# Patient Record
Sex: FEMALE | Race: WHITE | NOT HISPANIC OR LATINO | Employment: OTHER | URBAN - METROPOLITAN AREA
[De-identification: names, ages, dates, MRNs, and addresses within clinical notes are randomized per-mention and may not be internally consistent; named-entity substitution may affect disease eponyms.]

---

## 2019-10-20 ENCOUNTER — HOSPITAL ENCOUNTER (INPATIENT)
Facility: HOSPITAL | Age: 54
LOS: 2 days | Discharge: HOME | DRG: 433 | End: 2019-10-22
Attending: EMERGENCY MEDICINE | Admitting: FAMILY MEDICINE
Payer: MEDICARE

## 2019-10-20 ENCOUNTER — APPOINTMENT (INPATIENT)
Dept: RADIOLOGY | Facility: HOSPITAL | Age: 54
DRG: 433 | End: 2019-10-20
Payer: MEDICARE

## 2019-10-20 ENCOUNTER — APPOINTMENT (EMERGENCY)
Dept: CT IMAGING | Facility: HOSPITAL | Age: 54
DRG: 433 | End: 2019-10-20
Payer: MEDICARE

## 2019-10-20 ENCOUNTER — APPOINTMENT (INPATIENT)
Dept: CT IMAGING | Facility: HOSPITAL | Age: 54
DRG: 433 | End: 2019-10-20
Payer: MEDICARE

## 2019-10-20 DIAGNOSIS — K92.2 GI BLEED: ICD-10-CM

## 2019-10-20 DIAGNOSIS — F10.10 ALCOHOL ABUSE: ICD-10-CM

## 2019-10-20 DIAGNOSIS — F31.9 BIPOLAR DISORDER (HCC): ICD-10-CM

## 2019-10-20 DIAGNOSIS — R94.31 QT PROLONGATION: ICD-10-CM

## 2019-10-20 DIAGNOSIS — D64.9 SYMPTOMATIC ANEMIA: Primary | ICD-10-CM

## 2019-10-20 DIAGNOSIS — E87.6 HYPOKALEMIA: ICD-10-CM

## 2019-10-20 DIAGNOSIS — E87.1 HYPONATREMIA: ICD-10-CM

## 2019-10-20 LAB
ABO GROUP BLD: NORMAL
ALBUMIN SERPL BCP-MCNC: 3.7 G/DL (ref 3–5.2)
ALP SERPL-CCNC: 230 U/L (ref 43–122)
ALT SERPL W P-5'-P-CCNC: 51 U/L (ref 9–52)
ANION GAP SERPL CALCULATED.3IONS-SCNC: 16 MMOL/L (ref 5–14)
ANISOCYTOSIS BLD QL SMEAR: PRESENT
APTT PPP: 26 SECONDS (ref 25–32)
AST SERPL W P-5'-P-CCNC: 203 U/L (ref 14–36)
BACTERIA UR QL AUTO: ABNORMAL /HPF
BILIRUB DIRECT SERPL-MCNC: 0.8 MG/DL
BILIRUB SERPL-MCNC: 1.2 MG/DL
BILIRUB UR QL STRIP: NEGATIVE
BLD GP AB SCN SERPL QL: NEGATIVE
BUN SERPL-MCNC: 23 MG/DL (ref 5–25)
CALCIUM SERPL-MCNC: 8.9 MG/DL (ref 8.4–10.2)
CHLORIDE SERPL-SCNC: 88 MMOL/L (ref 97–108)
CLARITY UR: ABNORMAL
CO2 SERPL-SCNC: 29 MMOL/L (ref 22–30)
COLOR UR: YELLOW
CREAT SERPL-MCNC: 0.6 MG/DL (ref 0.6–1.2)
ERYTHROCYTE [DISTWIDTH] IN BLOOD BY AUTOMATED COUNT: 16.8 %
ETHANOL EXG-MCNC: 0.2 MG/DL
ETHANOL SERPL-MCNC: 149 MG/DL (ref 0–10)
GFR SERPL CREATININE-BSD FRML MDRD: 103 ML/MIN/1.73SQ M
GLUCOSE SERPL-MCNC: 101 MG/DL (ref 70–99)
GLUCOSE UR STRIP-MCNC: NEGATIVE MG/DL
HCT VFR BLD AUTO: 20.9 % (ref 36–46)
HGB BLD-MCNC: 6.9 G/DL (ref 12–16)
HGB UR QL STRIP.AUTO: NEGATIVE
HYPERCHROMIA BLD QL SMEAR: PRESENT
INR PPP: 1.07 (ref 0.91–1.09)
KETONES UR STRIP-MCNC: NEGATIVE MG/DL
LACTATE SERPL-SCNC: 3.2 MMOL/L (ref 0.7–2)
LACTATE SERPL-SCNC: 4.2 MMOL/L (ref 0.7–2)
LACTATE SERPL-SCNC: 4.4 MMOL/L (ref 0.7–2)
LEUKOCYTE ESTERASE UR QL STRIP: 100
LYMPHOCYTES # BLD AUTO: 22 % (ref 25–45)
LYMPHOCYTES # BLD AUTO: 3.83 THOUSAND/UL (ref 0.5–4)
MACROCYTES BLD QL AUTO: PRESENT
MCH RBC QN AUTO: 36.3 PG (ref 26–34)
MCHC RBC AUTO-ENTMCNC: 32.8 G/DL (ref 31–36)
MCV RBC AUTO: 111 FL (ref 80–100)
MONOCYTES # BLD AUTO: 1.74 THOUSAND/UL (ref 0.2–0.9)
MONOCYTES NFR BLD AUTO: 10 % (ref 1–10)
NEUTS SEG # BLD: 11.83 THOUSAND/UL (ref 1.8–7.8)
NEUTS SEG NFR BLD AUTO: 68 %
NITRITE UR QL STRIP: NEGATIVE
NON-SQ EPI CELLS URNS QL MICRO: ABNORMAL /HPF
NRBC BLD AUTO-RTO: 1 /100 WBC (ref 0–2)
PH UR STRIP.AUTO: 6 [PH]
PLATELET # BLD AUTO: 284 THOUSANDS/UL (ref 150–450)
PLATELET BLD QL SMEAR: ADEQUATE
PMV BLD AUTO: 9.4 FL (ref 8.9–12.7)
POLYCHROMASIA BLD QL SMEAR: PRESENT
POTASSIUM SERPL-SCNC: 2.3 MMOL/L (ref 3.6–5)
PROT SERPL-MCNC: 8.1 G/DL (ref 5.9–8.4)
PROT UR STRIP-MCNC: NEGATIVE MG/DL
PROTHROMBIN TIME: 11.8 SECONDS (ref 9.8–12)
RBC # BLD AUTO: 1.89 MILLION/UL (ref 4–5.2)
RBC #/AREA URNS AUTO: ABNORMAL /HPF
RBC MORPH BLD: ABNORMAL
RH BLD: POSITIVE
SODIUM SERPL-SCNC: 133 MMOL/L (ref 137–147)
SP GR UR STRIP.AUTO: 1.01 (ref 1–1.04)
SPECIMEN EXPIRATION DATE: NORMAL
TOTAL CELLS COUNTED SPEC: 100
TROPONIN I SERPL-MCNC: <0.01 NG/ML (ref 0–0.03)
UROBILINOGEN UA: NEGATIVE MG/DL
WBC # BLD AUTO: 17.4 THOUSAND/UL (ref 4.5–11)
WBC #/AREA URNS AUTO: ABNORMAL /HPF

## 2019-10-20 PROCEDURE — 71045 X-RAY EXAM CHEST 1 VIEW: CPT

## 2019-10-20 PROCEDURE — 85610 PROTHROMBIN TIME: CPT | Performed by: PHYSICIAN ASSISTANT

## 2019-10-20 PROCEDURE — 30233N1 TRANSFUSION OF NONAUTOLOGOUS RED BLOOD CELLS INTO PERIPHERAL VEIN, PERCUTANEOUS APPROACH: ICD-10-PCS | Performed by: EMERGENCY MEDICINE

## 2019-10-20 PROCEDURE — 99285 EMERGENCY DEPT VISIT HI MDM: CPT | Performed by: PHYSICIAN ASSISTANT

## 2019-10-20 PROCEDURE — 83605 ASSAY OF LACTIC ACID: CPT | Performed by: PHYSICIAN ASSISTANT

## 2019-10-20 PROCEDURE — 86901 BLOOD TYPING SEROLOGIC RH(D): CPT | Performed by: PHYSICIAN ASSISTANT

## 2019-10-20 PROCEDURE — 87040 BLOOD CULTURE FOR BACTERIA: CPT | Performed by: PHYSICIAN ASSISTANT

## 2019-10-20 PROCEDURE — C9113 INJ PANTOPRAZOLE SODIUM, VIA: HCPCS | Performed by: PHYSICIAN ASSISTANT

## 2019-10-20 PROCEDURE — 99223 1ST HOSP IP/OBS HIGH 75: CPT | Performed by: INTERNAL MEDICINE

## 2019-10-20 PROCEDURE — 96361 HYDRATE IV INFUSION ADD-ON: CPT

## 2019-10-20 PROCEDURE — P9016 RBC LEUKOCYTES REDUCED: HCPCS

## 2019-10-20 PROCEDURE — 72125 CT NECK SPINE W/O DYE: CPT

## 2019-10-20 PROCEDURE — 85730 THROMBOPLASTIN TIME PARTIAL: CPT | Performed by: PHYSICIAN ASSISTANT

## 2019-10-20 PROCEDURE — 36415 COLL VENOUS BLD VENIPUNCTURE: CPT | Performed by: PHYSICIAN ASSISTANT

## 2019-10-20 PROCEDURE — 84145 PROCALCITONIN (PCT): CPT | Performed by: EMERGENCY MEDICINE

## 2019-10-20 PROCEDURE — 96365 THER/PROPH/DIAG IV INF INIT: CPT

## 2019-10-20 PROCEDURE — 70450 CT HEAD/BRAIN W/O DYE: CPT

## 2019-10-20 PROCEDURE — 87086 URINE CULTURE/COLONY COUNT: CPT | Performed by: PHYSICIAN ASSISTANT

## 2019-10-20 PROCEDURE — 82075 ASSAY OF BREATH ETHANOL: CPT | Performed by: PHYSICIAN ASSISTANT

## 2019-10-20 PROCEDURE — 83605 ASSAY OF LACTIC ACID: CPT | Performed by: EMERGENCY MEDICINE

## 2019-10-20 PROCEDURE — 81001 URINALYSIS AUTO W/SCOPE: CPT | Performed by: PHYSICIAN ASSISTANT

## 2019-10-20 PROCEDURE — 99285 EMERGENCY DEPT VISIT HI MDM: CPT

## 2019-10-20 PROCEDURE — 86900 BLOOD TYPING SEROLOGIC ABO: CPT | Performed by: PHYSICIAN ASSISTANT

## 2019-10-20 PROCEDURE — 81003 URINALYSIS AUTO W/O SCOPE: CPT | Performed by: PHYSICIAN ASSISTANT

## 2019-10-20 PROCEDURE — 80048 BASIC METABOLIC PNL TOTAL CA: CPT | Performed by: PHYSICIAN ASSISTANT

## 2019-10-20 PROCEDURE — 86850 RBC ANTIBODY SCREEN: CPT | Performed by: PHYSICIAN ASSISTANT

## 2019-10-20 PROCEDURE — 80320 DRUG SCREEN QUANTALCOHOLS: CPT | Performed by: PHYSICIAN ASSISTANT

## 2019-10-20 PROCEDURE — 85027 COMPLETE CBC AUTOMATED: CPT | Performed by: PHYSICIAN ASSISTANT

## 2019-10-20 PROCEDURE — 80076 HEPATIC FUNCTION PANEL: CPT | Performed by: PHYSICIAN ASSISTANT

## 2019-10-20 PROCEDURE — 84484 ASSAY OF TROPONIN QUANT: CPT | Performed by: PHYSICIAN ASSISTANT

## 2019-10-20 PROCEDURE — 93005 ELECTROCARDIOGRAM TRACING: CPT

## 2019-10-20 PROCEDURE — 86920 COMPATIBILITY TEST SPIN: CPT

## 2019-10-20 PROCEDURE — 85007 BL SMEAR W/DIFF WBC COUNT: CPT | Performed by: PHYSICIAN ASSISTANT

## 2019-10-20 RX ORDER — ATORVASTATIN CALCIUM 40 MG/1
40 TABLET, FILM COATED ORAL DAILY
Status: DISCONTINUED | OUTPATIENT
Start: 2019-10-21 | End: 2019-10-22 | Stop reason: HOSPADM

## 2019-10-20 RX ORDER — PANTOPRAZOLE SODIUM 40 MG/1
40 INJECTION, POWDER, FOR SOLUTION INTRAVENOUS EVERY 12 HOURS SCHEDULED
Status: DISCONTINUED | OUTPATIENT
Start: 2019-10-20 | End: 2019-10-22

## 2019-10-20 RX ORDER — SODIUM CHLORIDE, SODIUM GLUCONATE, SODIUM ACETATE, POTASSIUM CHLORIDE, MAGNESIUM CHLORIDE, SODIUM PHOSPHATE, DIBASIC, AND POTASSIUM PHOSPHATE .53; .5; .37; .037; .03; .012; .00082 G/100ML; G/100ML; G/100ML; G/100ML; G/100ML; G/100ML; G/100ML
980 INJECTION, SOLUTION INTRAVENOUS ONCE
Status: COMPLETED | OUTPATIENT
Start: 2019-10-20 | End: 2019-10-20

## 2019-10-20 RX ORDER — LEVOTHYROXINE SODIUM 0.03 MG/1
25 TABLET ORAL DAILY
Status: DISCONTINUED | OUTPATIENT
Start: 2019-10-21 | End: 2019-10-22 | Stop reason: HOSPADM

## 2019-10-20 RX ORDER — SODIUM CHLORIDE, SODIUM GLUCONATE, SODIUM ACETATE, POTASSIUM CHLORIDE, MAGNESIUM CHLORIDE, SODIUM PHOSPHATE, DIBASIC, AND POTASSIUM PHOSPHATE .53; .5; .37; .037; .03; .012; .00082 G/100ML; G/100ML; G/100ML; G/100ML; G/100ML; G/100ML; G/100ML
100 INJECTION, SOLUTION INTRAVENOUS CONTINUOUS
Status: DISCONTINUED | OUTPATIENT
Start: 2019-10-20 | End: 2019-10-22

## 2019-10-20 RX ORDER — HALOPERIDOL 10 MG/1
TABLET ORAL
COMMUNITY
Start: 2019-10-16

## 2019-10-20 RX ORDER — SODIUM CHLORIDE, SODIUM GLUCONATE, SODIUM ACETATE, POTASSIUM CHLORIDE, MAGNESIUM CHLORIDE, SODIUM PHOSPHATE, DIBASIC, AND POTASSIUM PHOSPHATE .53; .5; .37; .037; .03; .012; .00082 G/100ML; G/100ML; G/100ML; G/100ML; G/100ML; G/100ML; G/100ML
100 INJECTION, SOLUTION INTRAVENOUS CONTINUOUS
Status: DISCONTINUED | OUTPATIENT
Start: 2019-10-20 | End: 2019-10-20

## 2019-10-20 RX ORDER — ATORVASTATIN CALCIUM 40 MG/1
40 TABLET, FILM COATED ORAL DAILY
COMMUNITY
Start: 2019-04-11

## 2019-10-20 RX ORDER — FOLIC ACID 1 MG/1
TABLET ORAL
Status: ON HOLD | COMMUNITY
Start: 2019-05-30 | End: 2019-10-21

## 2019-10-20 RX ORDER — POTASSIUM CHLORIDE 14.9 MG/ML
20 INJECTION INTRAVENOUS ONCE
Status: COMPLETED | OUTPATIENT
Start: 2019-10-20 | End: 2019-10-20

## 2019-10-20 RX ORDER — CEFTRIAXONE 2 G/50ML
2000 INJECTION, SOLUTION INTRAVENOUS EVERY 24 HOURS
Status: DISCONTINUED | OUTPATIENT
Start: 2019-10-20 | End: 2019-10-22

## 2019-10-20 RX ORDER — POTASSIUM CHLORIDE 750 MG/1
40 TABLET, EXTENDED RELEASE ORAL ONCE
Status: COMPLETED | OUTPATIENT
Start: 2019-10-20 | End: 2019-10-20

## 2019-10-20 RX ORDER — AMOXICILLIN 250 MG
CAPSULE ORAL
COMMUNITY
Start: 2012-11-14

## 2019-10-20 RX ORDER — LEVOTHYROXINE SODIUM 0.03 MG/1
50 TABLET ORAL
COMMUNITY
Start: 2019-09-23

## 2019-10-20 RX ORDER — SODIUM CHLORIDE, SODIUM GLUCONATE, SODIUM ACETATE, POTASSIUM CHLORIDE, MAGNESIUM CHLORIDE, SODIUM PHOSPHATE, DIBASIC, AND POTASSIUM PHOSPHATE .53; .5; .37; .037; .03; .012; .00082 G/100ML; G/100ML; G/100ML; G/100ML; G/100ML; G/100ML; G/100ML
1250 INJECTION, SOLUTION INTRAVENOUS ONCE
Status: DISCONTINUED | OUTPATIENT
Start: 2019-10-20 | End: 2019-10-20

## 2019-10-20 RX ADMIN — PANTOPRAZOLE SODIUM 40 MG: 40 INJECTION, POWDER, FOR SOLUTION INTRAVENOUS at 21:10

## 2019-10-20 RX ADMIN — CEFTRIAXONE 2000 MG: 2 INJECTION, SOLUTION INTRAVENOUS at 19:58

## 2019-10-20 RX ADMIN — FOLIC ACID: 5 INJECTION, SOLUTION INTRAMUSCULAR; INTRAVENOUS; SUBCUTANEOUS at 20:04

## 2019-10-20 RX ADMIN — SODIUM CHLORIDE 1000 ML: 0.9 INJECTION, SOLUTION INTRAVENOUS at 16:23

## 2019-10-20 RX ADMIN — SODIUM CHLORIDE, SODIUM GLUCONATE, SODIUM ACETATE, POTASSIUM CHLORIDE AND MAGNESIUM CHLORIDE 100 ML/HR: 526; 502; 368; 37; 30 INJECTION, SOLUTION INTRAVENOUS at 21:56

## 2019-10-20 RX ADMIN — POTASSIUM CHLORIDE 40 MEQ: 10 TABLET, EXTENDED RELEASE ORAL at 17:11

## 2019-10-20 RX ADMIN — POTASSIUM CHLORIDE 20 MEQ: 14.9 INJECTION, SOLUTION INTRAVENOUS at 17:15

## 2019-10-20 RX ADMIN — SODIUM CHLORIDE, SODIUM GLUCONATE, SODIUM ACETATE, POTASSIUM CHLORIDE AND MAGNESIUM CHLORIDE 980 ML: 526; 502; 368; 37; 30 INJECTION, SOLUTION INTRAVENOUS at 20:47

## 2019-10-20 NOTE — H&P
H&P- Camilo Moon 1965, 53 y o  female MRN: [id number]    Unit/Bed#: ED 10 Encounter: [id number]    Primary Care Provider: No primary care provider on file  Date and time admitted to hospital: 10/20/2019  3:40 PM        GI bleed  Assessment & Plan  Stool positive for occult blood  History of diarrhea for 6 months black stool  Hemoglobin hematocrit low 6 4  Blood transfusion 2 units packed cell  GI consult consider EGD rule out esophageal very says gastritis of duodenal ulcer bleed  With history of alcoholism enlarged liver  Patient is sign symptoms of cirrhosis  No previous history of GI bleed but had EGD done colonoscopy    Tobacco abuse  Assessment & Plan  History smoking counseling done  Patch applied    Alcohol abuse  Assessment & Plan  Patient with history of alcohol abuse during daily basis  This morning before coming to ER she had 2 large glass of vodka  Thiamine leak acid  Patient at present time alert oriented  CIWA protocol  Once stable will consider alcoholic rehab    Alcohol intoxication ([facility] [address] )  Assessment & Plan  History of alcoholism  CIWA protocol    Bipolar disorder ([facility] [address] )  Assessment & Plan  History of bipolar disorder present time patient is cooperative and pleasant need psych consult    * Symptomatic anemia  Assessment & Plan  Blood transfusion  Iron and B12 panel                          VTE Prophylaxis: Pharmacologic VTE Prophylaxis contraindicated due to GI bleed  / sequential compression device   Code Status:  Full code  POLST: There is no POLST form on file for this patient (pre-hospital)  Discussion with family:  Patient but he was available family member    Anticipated Length of Stay:  Patient will be admitted on an Inpatient basis with an anticipated length of stay of  more than 2 midnight 2 midnights  Justification for Hospital Stay:  GI bleed    Total Time for Visit, including Counseling / Coordination of Care: 45 minutes    Greater than 50% of this total time spent on direct patient counseling and coordination of care  Chief Complaint:   Patient fell dizzy and had a fall    History of Present Illness:    Alba Michael is a 53 y o  female who presents with came in emergency room found to have hemoglobin 6 patient was tachycardic hypotensive no of source of infection she has alcohol history of alcoholism she was having diarrhea for almost 6 months black color did not do any intervention because her boyfriend was very sick recently he diet in last week she is history of alcoholism and smoking this morning she fell it her head complaining of dizziness came in emergency room found to be severe anemia will transfuse patient on physical exam as enlarged liver symptom of cirrhosis rule out esophageal bleed GI consult start PPI patient also had some psych disorder  Review of Systems:    Review of Systems   Constitutional: Positive for activity change, appetite change and fatigue  Negative for chills and fever  HENT: Negative for hearing loss, sore throat and trouble swallowing  Eyes: Negative for photophobia, discharge and visual disturbance  Respiratory: Negative for chest tightness and shortness of breath  Cardiovascular: Positive for palpitations  Negative for chest pain  Gastrointestinal: Positive for abdominal distention, abdominal pain and diarrhea  Negative for blood in stool and vomiting  Endocrine: Negative for polydipsia and polyuria  Genitourinary: Negative for difficulty urinating, dysuria, flank pain and hematuria  Musculoskeletal: Positive for myalgias  Negative for back pain and gait problem  Skin: Negative for rash  Allergic/Immunologic: Negative for environmental allergies and food allergies  Neurological: Positive for dizziness, tremors, weakness and headaches  Negative for seizures and syncope  Hematological: Does not bruise/bleed easily  Psychiatric/Behavioral: Negative for behavioral problems     All other systems reviewed and are negative  Past Medical and Surgical History:     Past Medical History:   Diagnosis Date    Alcohol abuse     COPD (chronic obstructive pulmonary disease) ([facility] [address] )     Psychiatric disorder        Past Surgical History:   Procedure Laterality Date    HYSTERECTOMY         Meds/Allergies:    Prior to Admission medications    Medication Sig Start Date End Date Taking? Authorizing Provider   atorvastatin (LIPITOR) 40 mg tablet Take 40 mg by mouth daily 4/11/19  Yes Historical Provider, MD   folic acid (FOLVITE) 1 mg tablet Take 1 tab daily 5/30/19  Yes Historical Provider, MD   haloperidol (HALDOL) 10 mg tablet One tablet at bedtime 10/16/19  Yes Historical Provider, MD   levothyroxine 25 mcg tablet TAKE 1 TABLET BY MOUTH EVERY DAY 9/23/19  Yes Historical Provider, MD   senna-docusate sodium (SENOKOT S) 8 6-50 mg per tablet Twice daily PRN 11/14/12  Yes Historical Provider, MD   haloperidol decanoate (HALDOL DECANOATE) 50 mg/mL injection Inject 50 mg into the shoulder, thigh, or buttocks every 28 days  10/20/19  Historical Provider, MD     I have reviewed home medications with patient personally  Allergies:    Allergies   Allergen Reactions    Ibuprofen     Naproxen        Social History:     Marital Status:    Occupation:  Unknown  Patient Pre-hospital Living Situation:  Sister  Patient Pre-hospital Level of Mobility:  Limited  Patient Pre-hospital Diet Restrictions:  None  Substance Use History:   Social History     Substance and Sexual Activity   Alcohol Use Yes    Comment: daily     Social History     Tobacco Use   Smoking Status Current Every Day Smoker    Packs/day: 0 50   Smokeless Tobacco Never Used     Social History     Substance and Sexual Activity   Drug Use No       Family History:    non-contributory    Physical Exam:     Vitals:   Blood Pressure: 95/56 (10/20/19 1809)  Pulse: 102 (10/20/19 1809)  Temperature: 97 9 °F (36 6 °C) (10/20/19 1551)  Temp Source: Tympanic (10/20/19 1551)  Respirations: (!) 24 (10/20/19 1809)  SpO2: 98 % (10/20/19 1809)    Physical Exam   Constitutional: She is oriented to person, place, and time  She appears well-developed and well-nourished  HENT:   Head: Normocephalic and atraumatic  Right Ear: External ear normal    Left Ear: External ear normal    Mouth/Throat: Oropharynx is clear and moist    Eyes: Pupils are equal, round, and reactive to light  Conjunctivae and EOM are normal    Neck: Normal range of motion  Neck supple  Cardiovascular: Regular rhythm, normal heart sounds and intact distal pulses  Tachycardia 100 per minute and 92 per minute   Pulmonary/Chest: Effort normal and breath sounds normal    Spider angioma on chest wall   Abdominal: Soft  Bowel sounds are normal  She exhibits mass  There is tenderness  There is no rebound and no guarding  Liver in large and palpable   Genitourinary:   Genitourinary Comments: deferred   Musculoskeletal: Normal range of motion  Neurological: She is alert and oriented to person, place, and time  She has normal reflexes  Cranial nerves 2-12 are normal   Normal neurological exam   Skin: Skin is warm and dry  No rash noted  There is pallor  Tattoo on leg   Psychiatric: She has a normal mood and affect  Nursing note and vitals reviewed  Additional Data:     Lab Results: I have personally reviewed pertinent reports        Results from last 7 days   Lab Units 10/20/19  1619   WBC Thousand/uL 17 40*   HEMOGLOBIN g/dL 6 9*   HEMATOCRIT % 20 9*   PLATELETS Thousands/uL 284   LYMPHO PCT % 22*   MONO PCT % 10     Results from last 7 days   Lab Units 10/20/19  1618   SODIUM mmol/L 133*   POTASSIUM mmol/L 2 3*   CHLORIDE mmol/L 88*   CO2 mmol/L 29   BUN mg/dL 23   CREATININE mg/dL 0 60   ANION GAP mmol/L 16*   CALCIUM mg/dL 8 9   GLUCOSE RANDOM mg/dL 101*     Results from last 7 days   Lab Units 10/20/19  1618   INR  1 07             Results from last 7 days   Lab Units 10/20/19  1746   LACTIC ACID mmol/L 4 4*       Imaging: I have personally reviewed pertinent reports  CT head without contrast   Final Result by Naeem Dos Santos MD (10/20 1658)      No acute intracranial abnormality  Workstation performed: [id number]         CT spine cervical without contrast   Final Result by Naeem Dos Santos MD (10/20 1700)      No cervical spine fracture or traumatic malalignment  Esophageal thickening  Correlation for possible esophagitis recommended  Workstation performed: [id number]             EKG, Pathology, and Other Studies Reviewed on Admission:   · EKG:  Sinus rhythm    Allscripts / Epic Records Reviewed: Yes     ** Please Note: This note has been constructed using a voice recognition system   **

## 2019-10-20 NOTE — ASSESSMENT & PLAN NOTE
Stool positive for occult blood  History of diarrhea for 6 months black stool  Hemoglobin hematocrit low 6 4  Blood transfusion 2 units packed cell  GI consult consider EGD rule out esophageal very says gastritis of duodenal ulcer bleed  With history of alcoholism enlarged liver  Patient is sign symptoms of cirrhosis  No previous history of GI bleed but had EGD done colonoscopy

## 2019-10-20 NOTE — ED NOTES
Per ICU RN, Tammi Batista, report was called on pt   By Jesus Manuel Caal9 KIRSTEN Christian  10/20/19 1956

## 2019-10-20 NOTE — SEPSIS NOTE
Sepsis Note   Ezra Santos 53 y o  female MRN: [id number]  Unit/Bed#: ED 10 Encounter: [id number]      qSOFA     Row Name 10/20/19 1809 10/20/19 1713 10/20/19 1622 10/20/19 1551       Altered mental status GCS < 15  --  --  0  --     Respiratory Rate > / =22  1  0  --  0     Systolic BP < / =417  1  1  --  1     Q Sofa Score  2  1  1  1         Initial Sepsis Screening     Row Name 10/20/19 1816                Is the patient's history suggestive of a new or worsening infection? No  -BG        Suspected source of infection  --        Are two or more of the following signs & symptoms of infection both present and new to the patient? (!) Yes (Proceed)  -BG        Indicate SIRS criteria  Tachycardia > 90 bpm;Leukocytosis (WBC > 29277 IJL)  -BG        If the answer is yes to both questions, suspicion of sepsis is present  --        If severe sepsis is present AND tissue hypoperfusion perists in the hour after fluid resuscitation or lactate > 4, the patient meets criteria for SEPTIC SHOCK  --        Are any of the following organ dysfunction criteria present within 6 hours of suspected infection and SIRS criteria that are NOT considered to be chronic conditions?   (!) Yes  -BG        Organ dysfunction  Lactate > 2 0 mmol/L  -BG        Date of presentation of severe sepsis  --        Time of presentation of severe sepsis  --        Tissue hypoperfusion persists in the hour after crystalloid fluid administration, evidenced, by either:  --        Was hypotension present within one hour of the conclusion of crystalloid fluid administration?  --        Date of presentation of septic shock  --        Time of presentation of septic shock  --          User Key  (r) = Recorded By, (t) = Taken By, (c) = Cosigned By    [address] Name Provider Type    BG Andrea Hahn PA-C Physician Assistant          Sepsis not due to infection, pt with symptomatic anemia secondary to GI bleed with hgb of 6 9

## 2019-10-20 NOTE — ASSESSMENT & PLAN NOTE
Patient with history of alcohol abuse during daily basis  This morning before coming to ER she had 2 large glass of vodka  Thiamine leak acid  Patient at present time alert oriented  CIWA protocol  Once stable will consider alcoholic rehab

## 2019-10-20 NOTE — ED NOTES
Patient transported to [address] [place] [address], [address] [place] [address]  10/20/19 9357
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
## 2019-10-21 ENCOUNTER — ANESTHESIA EVENT (INPATIENT)
Dept: GASTROENTEROLOGY | Facility: HOSPITAL | Age: 54
DRG: 433 | End: 2019-10-21
Payer: MEDICARE

## 2019-10-21 ENCOUNTER — APPOINTMENT (INPATIENT)
Dept: CT IMAGING | Facility: HOSPITAL | Age: 54
DRG: 433 | End: 2019-10-21
Payer: MEDICARE

## 2019-10-21 ENCOUNTER — APPOINTMENT (INPATIENT)
Dept: GASTROENTEROLOGY | Facility: HOSPITAL | Age: 54
DRG: 433 | End: 2019-10-21
Payer: MEDICARE

## 2019-10-21 ENCOUNTER — ANESTHESIA (INPATIENT)
Dept: GASTROENTEROLOGY | Facility: HOSPITAL | Age: 54
DRG: 433 | End: 2019-10-21
Payer: MEDICARE

## 2019-10-21 PROBLEM — E87.6 HYPOKALEMIA: Status: ACTIVE | Noted: 2019-10-21

## 2019-10-21 LAB
ABO GROUP BLD BPU: NORMAL
ABO GROUP BLD BPU: NORMAL
ALBUMIN SERPL BCP-MCNC: 2.8 G/DL (ref 3–5.2)
ALP SERPL-CCNC: 186 U/L (ref 43–122)
ALT SERPL W P-5'-P-CCNC: 48 U/L (ref 9–52)
ANION GAP SERPL CALCULATED.3IONS-SCNC: 8 MMOL/L (ref 5–14)
AST SERPL W P-5'-P-CCNC: 155 U/L (ref 14–36)
ATRIAL RATE: 93 BPM
BACTERIA UR CULT: NORMAL
BASOPHILS # BLD AUTO: 0 THOUSANDS/ΜL (ref 0–0.1)
BASOPHILS NFR BLD AUTO: 1 % (ref 0–1)
BILIRUB SERPL-MCNC: 1.7 MG/DL
BPU ID: NORMAL
BPU ID: NORMAL
BUN SERPL-MCNC: 13 MG/DL (ref 5–25)
CA-I BLD-SCNC: 1.02 MMOL/L (ref 1.12–1.32)
CALCIUM SERPL-MCNC: 7.4 MG/DL (ref 8.4–10.2)
CHLORIDE SERPL-SCNC: 97 MMOL/L (ref 97–108)
CO2 SERPL-SCNC: 29 MMOL/L (ref 22–30)
CREAT SERPL-MCNC: 0.52 MG/DL (ref 0.6–1.2)
CROSSMATCH: NORMAL
CROSSMATCH: NORMAL
EOSINOPHIL # BLD AUTO: 0.1 THOUSAND/ΜL (ref 0–0.4)
EOSINOPHIL NFR BLD AUTO: 1 % (ref 0–6)
ERYTHROCYTE [DISTWIDTH] IN BLOOD BY AUTOMATED COUNT: 20.5 %
GFR SERPL CREATININE-BSD FRML MDRD: 108 ML/MIN/1.73SQ M
GLUCOSE SERPL-MCNC: 86 MG/DL (ref 70–99)
HCT VFR BLD AUTO: 21.9 % (ref 36–46)
HGB BLD-MCNC: 7.6 G/DL (ref 12–16)
INR PPP: 1.15 (ref 0.91–1.09)
LACTATE SERPL-SCNC: 0.6 MMOL/L (ref 0.7–2)
LACTATE SERPL-SCNC: 2.3 MMOL/L (ref 0.7–2)
LIPASE SERPL-CCNC: 134 U/L (ref 23–300)
LYMPHOCYTES # BLD AUTO: 1.7 THOUSANDS/ΜL (ref 0.5–4)
LYMPHOCYTES NFR BLD AUTO: 18 % (ref 25–45)
MAGNESIUM SERPL-MCNC: 2.1 MG/DL (ref 1.6–2.3)
MAGNESIUM SERPL-MCNC: 2.2 MG/DL (ref 1.6–2.3)
MCH RBC QN AUTO: 35.8 PG (ref 26–34)
MCHC RBC AUTO-ENTMCNC: 34.7 G/DL (ref 31–36)
MCV RBC AUTO: 103 FL (ref 80–100)
MONOCYTES # BLD AUTO: 1.6 THOUSAND/ΜL (ref 0.2–0.9)
MONOCYTES NFR BLD AUTO: 17 % (ref 1–10)
NEUTROPHILS # BLD AUTO: 6.1 THOUSANDS/ΜL (ref 1.8–7.8)
NEUTS SEG NFR BLD AUTO: 64 % (ref 45–65)
P AXIS: 48 DEGREES
PHOSPHATE SERPL-MCNC: 2.2 MG/DL (ref 2.5–4.8)
PLATELET # BLD AUTO: 157 THOUSANDS/UL (ref 150–450)
PMV BLD AUTO: 9.5 FL (ref 8.9–12.7)
POTASSIUM SERPL-SCNC: 2.5 MMOL/L (ref 3.6–5)
POTASSIUM SERPL-SCNC: 3.2 MMOL/L (ref 3.6–5)
PR INTERVAL: 136 MS
PROCALCITONIN SERPL-MCNC: 0.77 NG/ML
PROCALCITONIN SERPL-MCNC: 0.79 NG/ML
PROT SERPL-MCNC: 6.2 G/DL (ref 5.9–8.4)
PROTHROMBIN TIME: 12.7 SECONDS (ref 9.8–12)
QRS AXIS: 44 DEGREES
QRSD INTERVAL: 92 MS
QT INTERVAL: 412 MS
QTC INTERVAL: 512 MS
RBC # BLD AUTO: 2.12 MILLION/UL (ref 4–5.2)
SODIUM SERPL-SCNC: 134 MMOL/L (ref 137–147)
T WAVE AXIS: 42 DEGREES
UNIT DISPENSE STATUS: NORMAL
UNIT DISPENSE STATUS: NORMAL
UNIT PRODUCT CODE: NORMAL
UNIT PRODUCT CODE: NORMAL
UNIT RH: NORMAL
UNIT RH: NORMAL
VENTRICULAR RATE: 93 BPM
WBC # BLD AUTO: 9.5 THOUSAND/UL (ref 4.5–11)

## 2019-10-21 PROCEDURE — 84145 PROCALCITONIN (PCT): CPT | Performed by: PHYSICIAN ASSISTANT

## 2019-10-21 PROCEDURE — 85025 COMPLETE CBC W/AUTO DIFF WBC: CPT | Performed by: PHYSICIAN ASSISTANT

## 2019-10-21 PROCEDURE — P9016 RBC LEUKOCYTES REDUCED: HCPCS

## 2019-10-21 PROCEDURE — 83735 ASSAY OF MAGNESIUM: CPT | Performed by: PHYSICIAN ASSISTANT

## 2019-10-21 PROCEDURE — 99232 SBSQ HOSP IP/OBS MODERATE 35: CPT | Performed by: INTERNAL MEDICINE

## 2019-10-21 PROCEDURE — C9113 INJ PANTOPRAZOLE SODIUM, VIA: HCPCS | Performed by: PHYSICIAN ASSISTANT

## 2019-10-21 PROCEDURE — 88305 TISSUE EXAM BY PATHOLOGIST: CPT | Performed by: PATHOLOGY

## 2019-10-21 PROCEDURE — 84132 ASSAY OF SERUM POTASSIUM: CPT | Performed by: PHYSICIAN ASSISTANT

## 2019-10-21 PROCEDURE — 74178 CT ABD&PLV WO CNTR FLWD CNTR: CPT

## 2019-10-21 PROCEDURE — 80053 COMPREHEN METABOLIC PANEL: CPT | Performed by: PHYSICIAN ASSISTANT

## 2019-10-21 PROCEDURE — 82330 ASSAY OF CALCIUM: CPT | Performed by: PHYSICIAN ASSISTANT

## 2019-10-21 PROCEDURE — 93010 ELECTROCARDIOGRAM REPORT: CPT | Performed by: INTERNAL MEDICINE

## 2019-10-21 PROCEDURE — 84100 ASSAY OF PHOSPHORUS: CPT | Performed by: PHYSICIAN ASSISTANT

## 2019-10-21 PROCEDURE — C9113 INJ PANTOPRAZOLE SODIUM, VIA: HCPCS | Performed by: INTERNAL MEDICINE

## 2019-10-21 PROCEDURE — 83605 ASSAY OF LACTIC ACID: CPT | Performed by: PHYSICIAN ASSISTANT

## 2019-10-21 PROCEDURE — 83690 ASSAY OF LIPASE: CPT | Performed by: PHYSICIAN ASSISTANT

## 2019-10-21 PROCEDURE — 85610 PROTHROMBIN TIME: CPT | Performed by: PHYSICIAN ASSISTANT

## 2019-10-21 PROCEDURE — 0DB58ZX EXCISION OF ESOPHAGUS, VIA NATURAL OR ARTIFICIAL OPENING ENDOSCOPIC, DIAGNOSTIC: ICD-10-PCS | Performed by: INTERNAL MEDICINE

## 2019-10-21 RX ORDER — PROPOFOL 10 MG/ML
INJECTION, EMULSION INTRAVENOUS AS NEEDED
Status: DISCONTINUED | OUTPATIENT
Start: 2019-10-21 | End: 2019-10-21 | Stop reason: SURG

## 2019-10-21 RX ORDER — LIDOCAINE HYDROCHLORIDE 10 MG/ML
INJECTION, SOLUTION INFILTRATION; PERINEURAL AS NEEDED
Status: DISCONTINUED | OUTPATIENT
Start: 2019-10-21 | End: 2019-10-21 | Stop reason: SURG

## 2019-10-21 RX ORDER — POTASSIUM CHLORIDE 14.9 MG/ML
20 INJECTION INTRAVENOUS ONCE
Status: COMPLETED | OUTPATIENT
Start: 2019-10-21 | End: 2019-10-21

## 2019-10-21 RX ORDER — FERROUS SULFATE 325(65) MG
325 TABLET ORAL
Status: DISCONTINUED | OUTPATIENT
Start: 2019-10-22 | End: 2019-10-22 | Stop reason: HOSPADM

## 2019-10-21 RX ORDER — POTASSIUM CHLORIDE 20 MEQ/1
40 TABLET, EXTENDED RELEASE ORAL ONCE
Status: COMPLETED | OUTPATIENT
Start: 2019-10-21 | End: 2019-10-21

## 2019-10-21 RX ORDER — ASCORBIC ACID 500 MG
500 TABLET ORAL DAILY
Status: DISCONTINUED | OUTPATIENT
Start: 2019-10-22 | End: 2019-10-22 | Stop reason: HOSPADM

## 2019-10-21 RX ORDER — DEXTROSE AND POTASSIUM CHLORIDE 5; .15 G/100ML; G/100ML
50 SOLUTION INTRAVENOUS CONTINUOUS
Status: DISCONTINUED | OUTPATIENT
Start: 2019-10-21 | End: 2019-10-21

## 2019-10-21 RX ADMIN — POTASSIUM CHLORIDE 40 MEQ: 20 TABLET, EXTENDED RELEASE ORAL at 10:34

## 2019-10-21 RX ADMIN — POTASSIUM CHLORIDE 40 MEQ: 20 TABLET, EXTENDED RELEASE ORAL at 12:30

## 2019-10-21 RX ADMIN — POTASSIUM CHLORIDE 20 MEQ: 14.9 INJECTION, SOLUTION INTRAVENOUS at 02:58

## 2019-10-21 RX ADMIN — SODIUM CHLORIDE, SODIUM GLUCONATE, SODIUM ACETATE, POTASSIUM CHLORIDE AND MAGNESIUM CHLORIDE: 526; 502; 368; 37; 30 INJECTION, SOLUTION INTRAVENOUS at 09:34

## 2019-10-21 RX ADMIN — POTASSIUM CHLORIDE 40 MEQ: 20 TABLET, EXTENDED RELEASE ORAL at 02:58

## 2019-10-21 RX ADMIN — LEVOTHYROXINE SODIUM 25 MCG: 25 TABLET ORAL at 05:55

## 2019-10-21 RX ADMIN — POTASSIUM CHLORIDE 20 MEQ: 14.9 INJECTION, SOLUTION INTRAVENOUS at 08:40

## 2019-10-21 RX ADMIN — CEFTRIAXONE 2000 MG: 2 INJECTION, SOLUTION INTRAVENOUS at 19:05

## 2019-10-21 RX ADMIN — LIDOCAINE HYDROCHLORIDE 100 MG: 10 INJECTION, SOLUTION INFILTRATION; PERINEURAL at 09:44

## 2019-10-21 RX ADMIN — TOPICAL ANESTHETIC 1 SPRAY: 200 SPRAY DENTAL; PERIODONTAL at 09:42

## 2019-10-21 RX ADMIN — ATORVASTATIN CALCIUM 40 MG: 40 TABLET, FILM COATED ORAL at 10:34

## 2019-10-21 RX ADMIN — PROPOFOL 100 MG: 10 INJECTION, EMULSION INTRAVENOUS at 09:44

## 2019-10-21 RX ADMIN — FOLIC ACID: 5 INJECTION, SOLUTION INTRAMUSCULAR; INTRAVENOUS; SUBCUTANEOUS at 11:02

## 2019-10-21 RX ADMIN — SODIUM CHLORIDE, SODIUM GLUCONATE, SODIUM ACETATE, POTASSIUM CHLORIDE AND MAGNESIUM CHLORIDE 100 ML/HR: 526; 502; 368; 37; 30 INJECTION, SOLUTION INTRAVENOUS at 17:55

## 2019-10-21 RX ADMIN — PROPOFOL 25 MG: 10 INJECTION, EMULSION INTRAVENOUS at 09:47

## 2019-10-21 RX ADMIN — IOHEXOL 100 ML: 350 INJECTION, SOLUTION INTRAVENOUS at 10:19

## 2019-10-21 RX ADMIN — POTASSIUM PHOSPHATE, MONOBASIC AND POTASSIUM PHOSPHATE, DIBASIC 6 MMOL: 224; 236 INJECTION, SOLUTION INTRAVENOUS at 10:30

## 2019-10-21 RX ADMIN — SODIUM CHLORIDE, SODIUM GLUCONATE, SODIUM ACETATE, POTASSIUM CHLORIDE AND MAGNESIUM CHLORIDE 100 ML/HR: 526; 502; 368; 37; 30 INJECTION, SOLUTION INTRAVENOUS at 07:45

## 2019-10-21 RX ADMIN — PANTOPRAZOLE SODIUM 40 MG: 40 INJECTION, POWDER, FOR SOLUTION INTRAVENOUS at 08:20

## 2019-10-21 RX ADMIN — PANTOPRAZOLE SODIUM 40 MG: 40 INJECTION, POWDER, FOR SOLUTION INTRAVENOUS at 22:04

## 2019-10-21 NOTE — ASSESSMENT & PLAN NOTE
Patient with history of alcohol abuse during daily basis  This morning before coming to ER she had 2 large glass of vodka  Thiamine leak acid  Patient at present time alert oriented  CIWA protocol  Once stable will consider alcoholic rehab  Patient alert oriented no evidence of DTs

## 2019-10-21 NOTE — H&P (VIEW-ONLY)
Patient MRN: [id number]  Date of Service: 10/21/2019  Referring Provider: Edgar Rowland PA-C  Provider Creating Note: Alba Fritz PA-C  PCP: No primary care provider on file  Reason for Consult:  GI bleed    HISTORY OF PRESENT ILLNESS:  Yahaira Rios is a 53 y o  female with a past history of alcohol abuse, COPD with ongoing tobacco use abuse, bulimia and bipolar disorder  She states she is a chronic alcoholic with heavier use recently after the passing of her boyfriend 2 weeks ago  She began having dark/black tarry stool after his passing with every bowel movement  She did have vague diffuse abdominal pain around the same time which has now resolved  Two days ago she developed vomiting which she described as dark/black  However she does state this was after drinking prune juice which she takes regularly for constipation  She denies any bright red blood per rectum  She denies NSAIDs or anticoagulation  She denies prior upper or lower endoscopy  She was told that she may be developing liver problems by her PCP  She does admit to bulimia for many years stating she cut back to daily last year at the request of her boyfriend  She has had very poor PO intake over the past 2 weeks eating mostly ice in recent days  In the ED she was found have a hemoglobin 6 9,   She received 2 units packed red blood cells with a current hemoglobin of 7 6  Last hemoglobin from 2016 was normal   Platelets normal   INR 1 15  ETOH level on admission 149  Total bilirubin 1 7, , ALT 48,   CT abdomen pelvis from 2012 shows fatty liver, diffuse dilation of thickening of the esophagus suggesting esophagitis with dysmotility or GERD and a possible left adrenal adenoma  Review of Systems:    General:   No fever or chills; +weight loss  + poor p o  Intake   EENT:   No ear pain, facial swelling; No sneezing, sore throat  Skin:   No rashes, color changes     Respiratory:     No shortness of breath, cough, wheezing, stridor  Cardiovascular:     No chest pain, palpitations  Gastrointestinal:    As per HPI  Musculoskeletal:     No arthralgias, myalgias, swelling  Neurologic:   +dizziness  No numbness, weakness  No speech difficulties  Psych:   No agitation, suicidal ideations    Otherwise, All other twelve-point review of systems normal      Past Medical History:   Diagnosis Date    Alcohol abuse     [facility]ch[facility] obstructive [facility] disease) [facility] [address] )     Psychiatric disorder      Past Surgical History:   Procedure Laterality Date    HYSTERECTOMY       Allergies   Allergen Reactions    Erythromycin Rash     Reaction noted 30 yrs ago    Ibuprofen     Naproxen        Medications:  Home Medications  Prior to Admission medications    Medication Sig Start Date End Date Taking?  Authorizing Provider   atorvastatin (LIPITOR) 40 mg tablet Take 40 mg by mouth daily 4/11/19  Yes Historical Provider, MD   folic acid (FOLVITE) 1 mg tablet Take 1 tab daily 5/30/19  Yes Historical Provider, MD   haloperidol (HALDOL) 10 mg tablet One tablet at bedtime 10/16/19  Yes Historical Provider, MD   levothyroxine 25 mcg tablet TAKE 1 TABLET BY MOUTH EVERY DAY 9/23/19  Yes Historical Provider, MD   senna-docusate sodium (SENOKOT S) 8 6-50 mg per tablet Twice daily PRN 11/14/12  Yes Historical Provider, MD       Inhouse Medications    Current Facility-Administered Medications:     atorvastatin (LIPITOR) tablet 40 mg, 40 mg, Oral, Daily    cefTRIAXone (ROCEPHIN) IVPB (premix) 2,000 mg, 2,000 mg, Intravenous, Q24H, Stopped at 14/90/48 1764    folic acid 1 mg, thiamine (VITAMIN B1) 100 mg in sodium chloride 0 9 % 100 mL IV piggyback, , Intravenous, Daily    levothyroxine tablet 25 mcg, 25 mcg, Oral, Daily, 25 mcg at 10/21/19 0555    multi-electrolyte (PLASMALYTE-A/ISOLYTE-S PH 7 4) IV solution, 100 mL/hr, Intravenous, Continuous, 100 mL/hr at 10/21/19 0745    pantoprazole (PROTONIX) injection 40 mg, 40 mg, Intravenous, Q12H [address], 40 mg at 10/20/19 2110      Social History   reports that she has been smoking  She has been smoking about 0 50 packs per day  She has never used smokeless tobacco  She reports that she drinks alcohol  She reports that she does not use drugs  Family History  History reviewed  No pertinent family history  OBJECTIVE:    /67 (BP Location: Left arm)   Pulse 103   Temp 97 8 °F (36 6 °C) (Temporal)   Resp 16   Ht 5' 7" (1 702 m)   Wt 68 8 kg (151 lb 10 8 oz)   SpO2 95%   BMI 23 76 kg/m²   Physical Exam:     General Appearance:    Awake, alert, oriented x3, no distress, well developed, well    nourished   Head:    Normocephalic without obvious abnormality   Eyes:    PERRL, conjunctiva/corneas clear, EOM's intact        Neck:   Supple, no adenopathy   Throat:   Mucous membranes moist   Lungs:     Clear to auscultation bilaterally, no wheezing or rhonchi   Heart:    Regular rate and rhythm, S1 and S2 normal, no murmur   Abdomen:     Soft, non-tender, non-distended  bowel sounds active  No    masses, rebound or guarding  Extremities:   Extremities normal  No clubbing, cyanosis or edema   Psych  Derm:   Normal affect    No jaundice + spider angiomata on chest wall + pulmonary erythema   Neurologic:   CNII-XII grossly intact   Speech intact         Laboratory Studies:  Results from last 7 days   Lab Units 10/21/19  0551 10/20/19  1619 10/20/19  1618   WBC Thousand/uL 9 50 17 40*  --    HEMOGLOBIN g/dL 7 6* 6 9*  --    HEMATOCRIT % 21 9* 20 9*  --    MCV fL 103* 111*  --    PLATELETS Thousands/uL 157 284  --    INR  1 15*  --  1 07     Results from last 7 days   Lab Units 10/21/19  0551 10/21/19  0219 10/20/19  1618   SODIUM mmol/L 134*  --  133*   POTASSIUM mmol/L 3 2* 2 5* 2 3*   CHLORIDE mmol/L 97  --  88*   CO2 mmol/L 29  --  29   BUN mg/dL 13  --  23   CREATININE mg/dL 0 52*  --  0 60   CALCIUM mg/dL 7 4*  --  8 9   ALBUMIN g/dL 2 8*  --  3 7   TOTAL BILIRUBIN mg/dL 1 70*  -- 1  20   ALK PHOS U/L 186*  --  230*   ALT U/L 48  --  51   AST U/L 155*  --  203*           Imaging and Other Studies:  Ct Head Without Contrast    Result Date: 10/20/2019  Narrative: CT BRAIN - WITHOUT CONTRAST INDICATION:   fall, intoxication  COMPARISON:  MRI brain 6/17/2013  TECHNIQUE:  CT examination of the brain was performed  In addition to axial images, coronal 2D reformatted images were created and submitted for interpretation  Radiation dose length product (DLP) for this visit:  721 mGy-cm   This examination, like all CT scans performed in the [facility], was performed utilizing techniques to minimize radiation dose exposure, including the use of iterative reconstruction and automated exposure control  IMAGE QUALITY:  Diagnostic  FINDINGS: PARENCHYMA: Decreased attenuation is noted in periventricular and subcortical white matter demonstrating an appearance that is statistically most likely to represent moderate microangiopathic change  No CT signs of acute infarction  No intracranial mass, mass effect or midline shift  No acute parenchymal hemorrhage  VENTRICLES AND EXTRA-AXIAL SPACES:  Normal for the patient's age  VISUALIZED ORBITS AND PARANASAL SINUSES:  Unremarkable  CALVARIUM AND EXTRACRANIAL SOFT TISSUES:  Normal      Impression: No acute intracranial abnormality  Workstation performed: [id number]     Ct Spine Cervical Without Contrast    Result Date: 10/20/2019  Narrative: CT CERVICAL SPINE - WITHOUT CONTRAST INDICATION:   fall, intoxication  COMPARISON:  None  TECHNIQUE:  CT examination of the cervical spine was performed without intravenous contrast   Contiguous axial images were obtained  Sagittal and coronal reconstructions were performed  Radiation dose length product (DLP) for this visit:  295 mGy-cm     This examination, like all CT scans performed in the [facility], was performed utilizing techniques to minimize radiation dose exposure, including the use of iterative reconstruction and automated exposure control  IMAGE QUALITY:  Diagnostic  FINDINGS: ALIGNMENT:  Normal alignment of the cervical spine  No subluxation  VERTEBRAL BODIES:  No fracture  DEGENERATIVE CHANGES:  Mild multilevel cervical degenerative changes are noted most severe at C5-C6 without critical central canal stenosis  PREVERTEBRAL AND PARASPINAL SOFT TISSUES:  Unremarkable  THORACIC INLET:  Esophagus appears thickened  Impression: No cervical spine fracture or traumatic malalignment  Esophageal thickening  Correlation for possible esophagitis recommended  Workstation performed: [id number]         ASSESSMENT AND PLAN:  3 62-year-old female presents with black tarry stool and dark vomitus in the setting of alcohol abuse and bulimia  DDx:  Alcohol related esophagitis/gastritis, PUD, bulimia-related, varices  BUN:Cr normal    Will evaluate with EGD this morning  NPO  Continue PPI Q12H  Further plan based on results of endoscopy  2  Acute blood loss anemia s/p transfusion  Bone marrow suppression related to alcohol also likely  Continue to monitor H/H, transfuse as needed  3  Alcohol use disorder  Some stigmata of liver disease noted on exam with elevated liver enzymes, mild coagulopathy, hypoalbuminemia suggesting cirrhosis  Platelets normal   Will further evaluate with imaging  Recommend alcohol abstinence/rehab   4  Hypokalemia - correction per slim  5  Adrenal adenoma noted on imaging 2012  Discussed with Dr Jordon Chung - will order CT to evaluate  6  Tobacco abuse - recommend cessation           Figueroa Brown PA-C

## 2019-10-21 NOTE — ASSESSMENT & PLAN NOTE
Blood transfusion  Iron and B12 panel hemoglobin 7 6 post 2 unit transfusion  Time of discharge be she will put him on p o   Iron

## 2019-10-21 NOTE — ASSESSMENT & PLAN NOTE
History smoking counseling done  Patch applied  Old CT noted adrenal adenoma no follow-up repeat CT checking and also blood work

## 2019-10-21 NOTE — ASSESSMENT & PLAN NOTE
Patient is severely hypokalemic  Multiple doses of IV potassium given  Also mild it phos low will add K-Phos and potassium chloride  Most probably secondary to vomiting and poor p o   Intake and diarrhea need endocrine workup adenoma

## 2019-10-21 NOTE — PLAN OF CARE
Problem: Potential for Falls  Goal: Patient will remain free of falls  Description  INTERVENTIONS:  - Assess patient frequently for physical needs  -  Identify cognitive and physical deficits and behaviors that affect risk of falls  -  Letha fall precautions as indicated by assessment   - Educate patient/family on patient safety including physical limitations  - Instruct patient to call for assistance with activity based on assessment  - Modify environment to reduce risk of injury  - Consider OT/PT consult to assist with strengthening/mobility  Outcome: Progressing     Problem: Prexisting or High Potential for Compromised Skin Integrity  Goal: Skin integrity is maintained or improved  Description  INTERVENTIONS:  - Identify patients at risk for skin breakdown  - Assess and monitor skin integrity  - Assess and monitor nutrition and hydration status  - Monitor labs   - Assess for incontinence   - Turn and reposition patient  - Assist with mobility/ambulation  - Relieve pressure over bony prominences  - Avoid friction and shearing  - Provide appropriate hygiene as needed including keeping skin clean and dry  - Evaluate need for skin moisturizer/barrier cream  - Collaborate with interdisciplinary team   - Patient/family teaching  - Consider wound care consult   Outcome: Progressing     Problem: Nutrition/Hydration-ADULT  Goal: Nutrient/Hydration intake appropriate for improving, restoring or maintaining nutritional needs  Description  Monitor and assess patient's nutrition/hydration status for malnutrition  Collaborate with interdisciplinary team and initiate plan and interventions as ordered  Monitor patient's weight and dietary intake as ordered or per policy  Utilize nutrition screening tool and intervene as necessary  Determine patient's food preferences and provide high-protein, high-caloric foods as appropriate       INTERVENTIONS:  - Monitor oral intake, urinary output, labs, and treatment plans  - Assess nutrition and hydration status and recommend course of action  - Evaluate amount of meals eaten  - Assist patient with eating if necessary   - Allow adequate time for meals  - Recommend/ encourage appropriate diets, oral nutritional supplements, and vitamin/mineral supplements  - Order, calculate, and assess calorie counts as needed  - Recommend, monitor, and adjust tube feedings and TPN/PPN based on assessed needs  - Assess need for intravenous fluids  - Provide specific nutrition/hydration education as appropriate  - Include patient/family/caregiver in decisions related to nutrition  Outcome: Progressing     Problem: INFECTION - ADULT  Goal: Absence or prevention of progression during hospitalization  Description  INTERVENTIONS:  - Assess and monitor for signs and symptoms of infection  - Monitor lab/diagnostic results  - Monitor all insertion sites, i e  indwelling lines, tubes, and drains  - Monitor endotracheal if appropriate and nasal secretions for changes in amount and color  - [place] appropriate cooling/warming therapies per order  - Administer medications as ordered  - Instruct and encourage patient and family to use good hand hygiene technique  - Identify and instruct in appropriate isolation precautions for identified infection/condition  Outcome: Progressing  Goal: Absence of fever/infection during neutropenic period  Description  INTERVENTIONS:  - Monitor WBC    Outcome: Progressing     Problem: DISCHARGE PLANNING  Goal: Discharge to home or other facility with appropriate resources  Description  INTERVENTIONS:  - Identify barriers to discharge w/patient and caregiver  - Arrange for needed discharge resources and transportation as appropriate  - Identify discharge learning needs (meds, wound care, etc )  - Arrange for interpretive services to assist at discharge as needed  - Refer to Case Management Department for coordinating discharge planning if the patient needs post-hospital services based on physician/advanced practitioner order or complex needs related to functional status, cognitive ability, or social support system  Outcome: Progressing     Problem: Knowledge Deficit  Goal: Patient/family/caregiver demonstrates understanding of disease process, treatment plan, medications, and discharge instructions  Description  Complete learning assessment and assess knowledge base    Interventions:  - Provide teaching at level of understanding  - Provide teaching via preferred learning methods  Outcome: Progressing

## 2019-10-21 NOTE — INTERVAL H&P NOTE
H&P reviewed  After examining the patient I find no changes in the patients condition since the H&P had been written      Vitals:    10/21/19 0800   BP: 94/56   Pulse: 93   Resp: 20   Temp:    SpO2: 93%

## 2019-10-21 NOTE — NURSING NOTE
Pt awake, alert and oriented, aware of EGD this morning and NPO, denies pain,SR-ST on monitor, will continue to monitor pt

## 2019-10-21 NOTE — NURSING NOTE
Pt received into 409 from ER  AAO x4  Denies pain/SOB/N/V  ST on monitor  Sating 90-91% on RA  No signs of acute distress  Pt oriented to new unit, bed controls and calling system

## 2019-10-21 NOTE — SEPSIS NOTE
Sepsis Note   Levi Nassar 53 y o  female MRN: [id number]  Unit/Bed#:  Encounter: [id number]      qSOFA     Row Name 10/20/19 2020 10/20/19 1809 10/20/19 1713 10/20/19 1622 10/20/19 1551    Altered mental status GCS < 15  --  --  --  0  --    Respiratory Rate > / =22  0  1  0  --  0    Systolic BP < / =195  1  1  1  --  1    Q Sofa Score  1  2  1  1  1        Initial Sepsis Screening     Row Name 10/20/19 1943 10/20/19 1941 10/20/19 1816          Is the patient's history suggestive of a new or worsening infection?  --  No  -BG  No  -BG      Suspected source of infection  --  --  --      Are two or more of the following signs & symptoms of infection both present and new to the patient?   (!) Yes (Proceed) pt with symptomatic anemia with hgb of 6 9, not secondary to infeciton  -BG  (!) Yes (Proceed)  -BG  (!) Yes (Proceed)  -BG      Indicate SIRS criteria  --  --  Tachycardia > 90 bpm;Leukocytosis (WBC > 66629 IJL)  -BG      If the answer is yes to both questions, suspicion of sepsis is present  --  --  --      If severe sepsis is present AND tissue hypoperfusion perists in the hour after fluid resuscitation or lactate > 4, the patient meets criteria for SEPTIC SHOCK  --  --  --      Are any of the following organ dysfunction criteria present within 6 hours of suspected infection and SIRS criteria that are NOT considered to be chronic conditions?  --  No  -BG  (!) Yes  -BG      Organ dysfunction  --  --  Lactate > 2 0 mmol/L  -BG      Date of presentation of severe sepsis  --  --  --      Time of presentation of severe sepsis  --  --  --      Tissue hypoperfusion persists in the hour after crystalloid fluid administration, evidenced, by either:  --  --  --      Was hypotension present within one hour of the conclusion of crystalloid fluid administration?  --  --  --      Date of presentation of septic shock  --  --  --      Time of presentation of septic shock  --  --  --        User Key  (r) = Recorded By, (t) = Taken By, (c) = Cosigned By    Initials Name Provider Type    BG Lonnie Hampton PA-C Physician Assistant

## 2019-10-21 NOTE — SOCIAL WORK
LOS: 1 GMLOS: 2 1    Pt is not a bundle or a 30 day readmission  SW met with pt to complete assessment  Pt reports that her sig other  recently and she now lives by herself in a 1st floor apartment with 7 T[address]  Pt is disabled (receives SSI/SSD)  Sister Nanda Perez lives locally and is pt's primary support  Haylie Wise assists with grocery shopping, cleaning, laundry, and transportation  Pt states that she ambulates independently in the home but requires min assist on the stairs as she is "afraid of falling"  Due to ETOH abuse, pt states she has had many falls in the past  Pt's sister Haylie Wise has been trying to get pt admitted to a SNF-which pt does not want to do-which results in conflict between the two of them  Pt has been drinking 2 large glasses of vodka daily  BAL on admission was 149  Pt states that she cannot remember the last time she was sober  She was in a hospital based rehab program in the [facility]+ years ago  Pt notes that she has hx of two withdrawal seizures 10 years ago  She was seeing a neurologist and was taking medication but has since stopped  Pt denies drug abuse but does smoking   5-1ppd of cigarettes  Pt has hx of Bipolar Disorder  She is treated at [address] [place] [address] Dr by Dr Tay Lira  Pt states that she was receiving the Haldol Dec Injection but stopped a month or so ago and was transitioned to oral pills due to transportation issues  Pt states that she has never been admitted IP to a behavioral health unit  Pt denies any current legal issues  She does have a license but has stopped driving  Preferred pharmacy is [facility] on [address]   PCP is [address]  SW discussed HOST referral for D&A rehab  Pt agreeable  ZOYA consent signed and placed in scan bin  VM left with HOST regarding  Pt concerned regarding Medicare not paying for treatment  SW to discuss with HOST about [facility] funding  SW also made referral to [facility] to discuss MA application   Pt states that she has a life insurance policy and was told in the past that she would not qualify for MA because of this  PATHS to f/u  Nursing made aware of above

## 2019-10-21 NOTE — PROGRESS NOTES
Progress Note - David Waters 1965, 53 y o  female MRN: [id number]    Unit/Bed#:  Encounter: [id number]    Primary Care Provider: No primary care provider on file  Date and time admitted to hospital: 10/20/2019  3:40 PM        Hypokalemia  Assessment & Plan  Patient is severely hypokalemic  Multiple doses of IV potassium given  Also mild it phos low will add K-Phos and potassium chloride  Most probably secondary to vomiting and poor p o   Intake and diarrhea need endocrine workup adenoma    GI bleed  Assessment & Plan  Stool positive for occult blood  History of diarrhea for 6 months black stool  Hemoglobin hematocrit low 6 4  Blood transfusion 2 units packed cell  GI consult consider EGD rule out esophageal varesis or gastritis or duodenal ulcer bleed or gastritis  Seen by GI appreciate consult  Patient is going for   Patient with spider angioma get ultrasound or CT of liver CC is evidence of cirrhosis INR is okay          Tobacco abuse  Assessment & Plan  History smoking counseling done  Patch applied  Old CT noted adrenal adenoma no follow-up repeat CT checking and also blood work    Alcohol abuse  Assessment & Plan  Patient with history of alcohol abuse during daily basis  This morning before coming to ER she had 2 large glass of vodka  Thiamine leak acid  Patient at present time alert oriented  CIWA protocol  Once stable will consider alcoholic rehab  Patient alert oriented no evidence of DTs    Alcohol intoxication ([facility] [address] )  Assessment & Plan  History of alcoholism  CIWA protocol  No evidence of DTs    Bipolar disorder ([facility] [address] )  Assessment & Plan  History of bipolar disorder present time patient is cooperative and pleasant need psych consult  Patient follow-up outpatient by psychiatrist emotionally she is okay is on Haldol once a day any problem will consult psychiatrist discussed with patient at present time she feels okay does not need psychiatrist    * Symptomatic anemia  Assessment & Plan  Blood transfusion  Iron and B12 panel hemoglobin 7 6 post 2 unit transfusion  Time of discharge be she will put him on p o  Iron                            VTE Pharmacologic Prophylaxis:   Pharmacologic: Pharmacologic VTE Prophylaxis contraindicated due to GI bleed  Mechanical VTE Prophylaxis in Place: Yes    Patient Centered Rounds: I have performed bedside rounds with nursing staff today  Discussions with Specialists or Other Care Team Provider:  GI    Education and Discussions with Family / Patient:  Discussed with patient medical information given    Time Spent for Care: 30 minutes  More than 50% of total time spent on counseling and coordination of care as described above  Current Length of Stay: 1 day(s)    Current Patient Status: Inpatient   Certification Statement: The patient will continue to require additional inpatient hospital stay due to GI bleed    Discharge Plan:  when medically stable    Code Status: Level 1 - Full Code      Subjective:   No chest pain or shortness of breath no diarrhea no vomiting since admission discussed with patient denied depression or does not want to see psychiatrist    Objective:     Vitals:   Temp (24hrs), Av 9 °F (37 2 °C), Min:97 8 °F (36 6 °C), Max:99 7 °F (37 6 °C)    Temp:  [97 8 °F (36 6 °C)-99 7 °F (37 6 °C)] 97 8 °F (36 6 °C)  HR:  [] 93  Resp:  [13-25] 20  BP: ()/(49-67) 94/56  SpO2:  [86 %-98 %] 93 %  Body mass index is 23 76 kg/m²  Input and Output Summary (last 24 hours): Intake/Output Summary (Last 24 hours) at 10/21/2019 0855  Last data filed at 10/21/2019 0749  Gross per 24 hour   Intake 2978 33 ml   Output 1570 ml   Net 1408 33 ml       Physical Exam:     Physical Exam   Constitutional: She is oriented to person, place, and time  She appears well-developed and well-nourished  HENT:   Head: Normocephalic and atraumatic     Right Ear: External ear normal    Left Ear: External ear normal    Mouth/Throat: Oropharynx is clear and moist    Eyes: Pupils are equal, round, and reactive to light  Conjunctivae and EOM are normal    Neck: Normal range of motion  Neck supple  Cardiovascular: Normal rate, regular rhythm, normal heart sounds and intact distal pulses  Pulmonary/Chest: Effort normal and breath sounds normal    Abdominal: Soft  Bowel sounds are normal  She exhibits no mass  There is no tenderness  There is no rebound and no guarding  Liver margin palpable   Genitourinary:   Genitourinary Comments: deferred   Musculoskeletal: Normal range of motion  Neurological: She is alert and oriented to person, place, and time  She has normal reflexes  Cranial nerves 2-12 are normal   Normal neurological exam   Skin: Skin is warm and dry  No rash noted  There is pallor  Tattoo on foot   Psychiatric: She has a normal mood and affect  Nursing note and vitals reviewed  Additional Data:     Labs:    Results from last 7 days   Lab Units 10/21/19  0551   WBC Thousand/uL 9 50   HEMOGLOBIN g/dL 7 6*   HEMATOCRIT % 21 9*   PLATELETS Thousands/uL 157   NEUTROS PCT % 64   LYMPHS PCT % 18*   MONOS PCT % 17*   EOS PCT % 1     Results from last 7 days   Lab Units 10/21/19  0551   SODIUM mmol/L 134*   POTASSIUM mmol/L 3 2*   CHLORIDE mmol/L 97   CO2 mmol/L 29   BUN mg/dL 13   CREATININE mg/dL 0 52*   ANION GAP mmol/L 8   CALCIUM mg/dL 7 4*   ALBUMIN g/dL 2 8*   TOTAL BILIRUBIN mg/dL 1 70*   ALK PHOS U/L 186*   ALT U/L 48   AST U/L 155*   GLUCOSE RANDOM mg/dL 86     Results from last 7 days   Lab Units 10/21/19  0551   INR  1 15*             Results from last 7 days   Lab Units 10/21/19  0219 10/21/19  0017 10/20/19  2209 10/20/19  1928   LACTIC ACID mmol/L 0 6* 2 3* 3 2* 4 2*           * I Have Reviewed All Lab Data Listed Above  * Additional Pertinent Lab Tests Reviewed:  [address] Admission Reviewed    Imaging:    Imaging Reports Reviewed Today Include:  None available x-ray pending  Imaging Personally Reviewed by Myself Includes: Old reviewed    Recent Cultures (last 7 days):           Last 24 Hours Medication List:     Current Facility-Administered Medications:  [facility]    cefTRIAXone 2,000 mg Intravenous Q24H [facility] Last Rate: Stopped (10/20/19 2030)   dextrose 5 % with KCl 20 mEq/L 50 mL/hr Intravenous Continuous Christal Tran MD    folic acid 1 mg, thiamine 100 mg in 0 9% sodium chloride 100 mL IVPB  Intravenous Daily Karma Steinberg PA-C    levothyroxine 25 mcg Oral Daily Karma Steinberg PA-C    multi-electrolyte 100 mL/hr Intravenous Continuous [place], [place] Last Rate: 100 mL/hr (10/21/19 0745)   pantoprazole 40 mg Intravenous Q12H [address] Karma Wolf PA-C    potassium chloride 40 mEq Oral Once Christal Tran MD    potassium chloride 40 mEq Oral Once Christal Tran MD    potassium chloride 20 mEq Intravenous Once Christal Tran MD Last Rate: 20 mEq (10/21/19 0840)   potassium phosphate 6 mmol Intravenous Once Christal Tran MD         Today, Patient Was Seen By: Christal Tran MD    ** Please Note: Dictation voice to text software may have been used in the creation of this document   **

## 2019-10-21 NOTE — CONSULTS
Patient MRN: [id number]  Date of Service: 10/21/2019  Referring Provider: Paty Pena PA-C  Provider Creating Note: Mary Guillory PA-C  PCP: No primary care provider on file  Reason for Consult:  GI bleed    HISTORY OF PRESENT ILLNESS:  Dayron Ghotra is a 53 y o  female with a past history of alcohol abuse, COPD with ongoing tobacco use abuse, bulimia and bipolar disorder  She states she is a chronic alcoholic with heavier use recently after the passing of her boyfriend 2 weeks ago  She began having dark/black tarry stool after his passing with every bowel movement  She did have vague diffuse abdominal pain around the same time which has now resolved  Two days ago she developed vomiting which she described as dark/black  However she does state this was after drinking prune juice which she takes regularly for constipation  She denies any bright red blood per rectum  She denies NSAIDs or anticoagulation  She denies prior upper or lower endoscopy  She was told that she may be developing liver problems by her PCP  She does admit to bulimia for many years stating she cut back to daily last year at the request of her boyfriend  She has had very poor PO intake over the past 2 weeks eating mostly ice in recent days  In the ED she was found have a hemoglobin 6 9,   She received 2 units packed red blood cells with a current hemoglobin of 7 6  Last hemoglobin from 2016 was normal   Platelets normal   INR 1 15  ETOH level on admission 149  Total bilirubin 1 7, , ALT 48,   CT abdomen pelvis from 2012 shows fatty liver, diffuse dilation of thickening of the esophagus suggesting esophagitis with dysmotility or GERD and a possible left adrenal adenoma  Review of Systems:    General:   No fever or chills; +weight loss  + poor p o  Intake   EENT:   No ear pain, facial swelling; No sneezing, sore throat  Skin:   No rashes, color changes     Respiratory:     No shortness of breath, cough, wheezing, stridor  Cardiovascular:     No chest pain, palpitations  Gastrointestinal:    As per HPI  Musculoskeletal:     No arthralgias, myalgias, swelling  Neurologic:   +dizziness  No numbness, weakness  No speech difficulties  Psych:   No agitation, suicidal ideations    Otherwise, All other twelve-point review of systems normal      Past Medical History:   Diagnosis Date    Alcohol abuse     COPD (chronic obstructive pulmonary disease) ([facility] [address] )     Psychiatric disorder      Past Surgical History:   Procedure Laterality Date    HYSTERECTOMY       Allergies   Allergen Reactions    Erythromycin Rash     Reaction noted 30 yrs ago    Ibuprofen     Naproxen        Medications:  Home Medications  Prior to Admission medications    Medication Sig Start Date End Date Taking?  Authorizing Provider   atorvastatin (LIPITOR) 40 mg tablet Take 40 mg by mouth daily 4/11/19  Yes Historical Provider, MD   folic acid (FOLVITE) 1 mg tablet Take 1 tab daily 5/30/19  Yes Historical Provider, MD   haloperidol (HALDOL) 10 mg tablet One tablet at bedtime 10/16/19  Yes Historical Provider, MD   levothyroxine 25 mcg tablet TAKE 1 TABLET BY MOUTH EVERY DAY 9/23/19  Yes Historical Provider, MD   senna-docusate sodium (SENOKOT S) 8 6-50 mg per tablet Twice daily PRN 11/14/12  Yes Historical Provider, MD       Inhouse Medications    Current Facility-Administered Medications:     atorvastatin (LIPITOR) tablet 40 mg, 40 mg, Oral, Daily    cefTRIAXone (ROCEPHIN) IVPB (premix) 2,000 mg, 2,000 mg, Intravenous, Q24H, Stopped at 22/53/63 6598    folic acid 1 mg, thiamine (VITAMIN B1) 100 mg in sodium chloride 0 9 % 100 mL IV piggyback, , Intravenous, Daily    levothyroxine tablet 25 mcg, 25 mcg, Oral, Daily, 25 mcg at 10/21/19 0555    multi-electrolyte (PLASMALYTE-A/ISOLYTE-S PH 7 4) IV solution, 100 mL/hr, Intravenous, Continuous, 100 mL/hr at 10/21/19 0745    pantoprazole (PROTONIX) injection 40 mg, 40 mg, Intravenous, Q12H [address], 40 mg at 10/20/19 2110      Social History   reports that she has been smoking  She has been smoking about 0 50 packs per day  She has never used smokeless tobacco  She reports that she drinks alcohol  She reports that she does not use drugs  Family History  History reviewed  No pertinent family history  OBJECTIVE:    /67 (BP Location: Left arm)   Pulse 103   Temp 97 8 °F (36 6 °C) (Temporal)   Resp 16   Ht 5' 7" (1 702 m)   Wt 68 8 kg (151 lb 10 8 oz)   SpO2 95%   BMI 23 76 kg/m²   Physical Exam:     General Appearance:    Awake, alert, oriented x3, no distress, well developed, well    nourished   Head:    Normocephalic without obvious abnormality   Eyes:    PERRL, conjunctiva/corneas clear, EOM's intact        Neck:   Supple, no adenopathy   Throat:   Mucous membranes moist   Lungs:     Clear to auscultation bilaterally, no wheezing or rhonchi   Heart:    Regular rate and rhythm, S1 and S2 normal, no murmur   Abdomen:     Soft, non-tender, non-distended  bowel sounds active  No    masses, rebound or guarding  Extremities:   Extremities normal  No clubbing, cyanosis or edema   Psych  Derm:   Normal affect    No jaundice + spider angiomata on chest wall + pulmonary erythema   Neurologic:   CNII-XII grossly intact   Speech intact         Laboratory Studies:  Results from last 7 days   Lab Units 10/21/19  0551 10/20/19  1619 10/20/19  1618   WBC Thousand/uL 9 50 17 40*  --    HEMOGLOBIN g/dL 7 6* 6 9*  --    HEMATOCRIT % 21 9* 20 9*  --    MCV fL 103* 111*  --    PLATELETS Thousands/uL 157 284  --    INR  1 15*  --  1 07     Results from last 7 days   Lab Units 10/21/19  0551 10/21/19  0219 10/20/19  1618   SODIUM mmol/L 134*  --  133*   POTASSIUM mmol/L 3 2* 2 5* 2 3*   CHLORIDE mmol/L 97  --  88*   CO2 mmol/L 29  --  29   BUN mg/dL 13  --  23   CREATININE mg/dL 0 52*  --  0 60   CALCIUM mg/dL 7 4*  --  8 9   ALBUMIN g/dL 2 8*  --  3 7   TOTAL BILIRUBIN mg/dL 1 70*  -- 1  20   ALK PHOS U/L 186*  --  230*   ALT U/L 48  --  51   AST U/L 155*  --  203*           Imaging and Other Studies:  Ct Head Without Contrast    Result Date: 10/20/2019  Narrative: CT BRAIN - WITHOUT CONTRAST INDICATION:   fall, intoxication  COMPARISON:  MRI brain 6/17/2013  TECHNIQUE:  CT examination of the brain was performed  In addition to axial images, coronal 2D reformatted images were created and submitted for interpretation  Radiation dose length product (DLP) for this visit:  721 mGy-cm   This examination, like all CT scans performed in the [facility], was performed utilizing techniques to minimize radiation dose exposure, including the use of iterative reconstruction and automated exposure control  IMAGE QUALITY:  Diagnostic  FINDINGS: PARENCHYMA: Decreased attenuation is noted in periventricular and subcortical white matter demonstrating an appearance that is statistically most likely to represent moderate microangiopathic change  No CT signs of acute infarction  No intracranial mass, mass effect or midline shift  No acute parenchymal hemorrhage  VENTRICLES AND EXTRA-AXIAL SPACES:  Normal for the patient's age  VISUALIZED ORBITS AND PARANASAL SINUSES:  Unremarkable  CALVARIUM AND EXTRACRANIAL SOFT TISSUES:  Normal      Impression: No acute intracranial abnormality  Workstation performed: [id number]     Ct Spine Cervical Without Contrast    Result Date: 10/20/2019  Narrative: CT CERVICAL SPINE - WITHOUT CONTRAST INDICATION:   fall, intoxication  COMPARISON:  None  TECHNIQUE:  CT examination of the cervical spine was performed without intravenous contrast   Contiguous axial images were obtained  Sagittal and coronal reconstructions were performed  Radiation dose length product (DLP) for this visit:  295 mGy-cm     This examination, like all CT scans performed in the [facility], was performed utilizing techniques to minimize radiation dose exposure, including the use of iterative reconstruction and automated exposure control  IMAGE QUALITY:  Diagnostic  FINDINGS: ALIGNMENT:  Normal alignment of the cervical spine  No subluxation  VERTEBRAL BODIES:  No fracture  DEGENERATIVE CHANGES:  Mild multilevel cervical degenerative changes are noted most severe at C5-C6 without critical central canal stenosis  PREVERTEBRAL AND PARASPINAL SOFT TISSUES:  Unremarkable  THORACIC INLET:  Esophagus appears thickened  Impression: No cervical spine fracture or traumatic malalignment  Esophageal thickening  Correlation for possible esophagitis recommended  Workstation performed: [id number]         ASSESSMENT AND PLAN:  3 41-year-old female presents with black tarry stool and dark vomitus in the setting of alcohol abuse and bulimia  DDx:  Alcohol related esophagitis/gastritis, PUD, bulimia-related, varices  BUN:Cr normal    Will evaluate with EGD this morning  NPO  Continue PPI Q12H  Further plan based on results of endoscopy  2  Acute blood loss anemia s/p transfusion  Bone marrow suppression related to alcohol also likely  Continue to monitor H/H, transfuse as needed  3  Alcohol use disorder  Some stigmata of liver disease noted on exam with elevated liver enzymes, mild coagulopathy, hypoalbuminemia suggesting cirrhosis  Platelets normal   Will further evaluate with imaging  Recommend alcohol abstinence/rehab   4  Hypokalemia - correction per slim  5  Adrenal adenoma noted on imaging 2012  Discussed with Dr Em Vazquez - will order CT to evaluate  6  Tobacco abuse - recommend cessation           Keya Miller PA-C

## 2019-10-21 NOTE — NURSING NOTE
Blood transfusion completed with no complications  VS obtained and charted  Pt temp 99 7 at thsi time  PA made aware, no further order

## 2019-10-21 NOTE — ASSESSMENT & PLAN NOTE
History of bipolar disorder present time patient is cooperative and pleasant need psych consult  Patient follow-up outpatient by psychiatrist emotionally she is okay is on Haldol once a day any problem will consult psychiatrist discussed with patient at present time she feels okay does not need psychiatrist

## 2019-10-21 NOTE — ASSESSMENT & PLAN NOTE
Stool positive for occult blood  History of diarrhea for 6 months black stool  Hemoglobin hematocrit low 6 4  Blood transfusion 2 units packed cell  GI consult consider EGD rule out esophageal varesis or gastritis or duodenal ulcer bleed or gastritis  Seen by GI appreciate consult  Patient is going for   Patient with spider angioma get ultrasound or CT of liver CC is evidence of cirrhosis INR is okay

## 2019-10-21 NOTE — ANESTHESIA PREPROCEDURE EVALUATION
Review of Systems/Medical History  Patient summary reviewed        Cardiovascular  Negative cardio ROS    Pulmonary  Smoker cigarette smoker  , Tobacco cessation counseling given Cumulative Pack Years: 36, COPD ,        GI/Hepatic    GI bleeding ,   Comment: H/O ETOH Abuse     Negative  ROS        Endo/Other    Comment: Hypokalemia - 3 2 improved from yesterday, on IV replacement currently    GYN    Hysterectomy,        Hematology  Anemia acute blood loss anemia,    Comment: Received 2 units PRBCS yesterday Musculoskeletal  Negative musculoskeletal ROS        Neurology  Seizures (H/O ETOH withdrawal) ,     Psychology   Depression , bipolar disorder,              Physical Exam    Airway    Mallampati score: II  TM Distance: >3 FB  Neck ROM: full     Dental       Cardiovascular  Comment: Negative ROS, Cardiovascular exam normal    Pulmonary  Pulmonary exam normal     Other Findings        Anesthesia Plan  ASA Score- 3     Anesthesia Type- IV sedation with anesthesia with ASA Monitors  Additional Monitors:   Airway Plan:         Plan Factors-    Induction-     Postoperative Plan-     Informed Consent- Anesthetic plan and risks discussed with patient  I personally reviewed this patient with the CRNA  Discussed and agreed on the Anesthesia Plan with the CRNA  Dena Martin

## 2019-10-22 VITALS
OXYGEN SATURATION: 96 % | HEIGHT: 67 IN | BODY MASS INDEX: 23.25 KG/M2 | RESPIRATION RATE: 20 BRPM | WEIGHT: 148.15 LBS | SYSTOLIC BLOOD PRESSURE: 112 MMHG | HEART RATE: 109 BPM | DIASTOLIC BLOOD PRESSURE: 67 MMHG | TEMPERATURE: 99 F

## 2019-10-22 PROBLEM — E87.1 HYPONATREMIA: Status: ACTIVE | Noted: 2019-10-22

## 2019-10-22 PROBLEM — K70.30 ALCOHOLIC CIRRHOSIS OF LIVER WITHOUT ASCITES (HCC): Status: ACTIVE | Noted: 2019-10-22

## 2019-10-22 LAB
ALBUMIN SERPL BCP-MCNC: 2.8 G/DL (ref 3–5.2)
ALP SERPL-CCNC: 207 U/L (ref 43–122)
ALT SERPL W P-5'-P-CCNC: 40 U/L (ref 9–52)
ANION GAP SERPL CALCULATED.3IONS-SCNC: 5 MMOL/L (ref 5–14)
AST SERPL W P-5'-P-CCNC: 110 U/L (ref 14–36)
BILIRUB SERPL-MCNC: 1.4 MG/DL
BUN SERPL-MCNC: 4 MG/DL (ref 5–25)
CALCIUM SERPL-MCNC: 7.5 MG/DL (ref 8.4–10.2)
CHLORIDE SERPL-SCNC: 100 MMOL/L (ref 97–108)
CO2 SERPL-SCNC: 28 MMOL/L (ref 22–30)
CORTIS SERPL-MCNC: 14 UG/DL
CREAT SERPL-MCNC: 0.48 MG/DL (ref 0.6–1.2)
ERYTHROCYTE [DISTWIDTH] IN BLOOD BY AUTOMATED COUNT: 20.9 %
FERRITIN SERPL-MCNC: 105 NG/ML (ref 8–388)
GFR SERPL CREATININE-BSD FRML MDRD: 111 ML/MIN/1.73SQ M
GLUCOSE SERPL-MCNC: 101 MG/DL (ref 70–99)
HCT VFR BLD AUTO: 24 % (ref 36–46)
HGB BLD-MCNC: 8.2 G/DL (ref 12–16)
IRON SATN MFR SERPL: 8 %
IRON SERPL-MCNC: 21 UG/DL (ref 50–170)
MCH RBC QN AUTO: 35.1 PG (ref 26–34)
MCHC RBC AUTO-ENTMCNC: 34.4 G/DL (ref 31–36)
MCV RBC AUTO: 102 FL (ref 80–100)
PLATELET # BLD AUTO: 141 THOUSANDS/UL (ref 150–450)
PMV BLD AUTO: 10.2 FL (ref 8.9–12.7)
POTASSIUM SERPL-SCNC: 3.4 MMOL/L (ref 3.6–5)
PROT SERPL-MCNC: 6.2 G/DL (ref 5.9–8.4)
RBC # BLD AUTO: 2.35 MILLION/UL (ref 4–5.2)
SODIUM SERPL-SCNC: 133 MMOL/L (ref 137–147)
TIBC SERPL-MCNC: 257 UG/DL (ref 250–450)
VIT B12 SERPL-MCNC: 1109 PG/ML (ref 100–900)
WBC # BLD AUTO: 8.5 THOUSAND/UL (ref 4.5–11)

## 2019-10-22 PROCEDURE — 82533 TOTAL CORTISOL: CPT | Performed by: INTERNAL MEDICINE

## 2019-10-22 PROCEDURE — 93005 ELECTROCARDIOGRAM TRACING: CPT

## 2019-10-22 PROCEDURE — 86038 ANTINUCLEAR ANTIBODIES: CPT | Performed by: FAMILY MEDICINE

## 2019-10-22 PROCEDURE — 99221 1ST HOSP IP/OBS SF/LOW 40: CPT | Performed by: PSYCHIATRY & NEUROLOGY

## 2019-10-22 PROCEDURE — 83550 IRON BINDING TEST: CPT | Performed by: INTERNAL MEDICINE

## 2019-10-22 PROCEDURE — 82728 ASSAY OF FERRITIN: CPT | Performed by: FAMILY MEDICINE

## 2019-10-22 PROCEDURE — 85027 COMPLETE CBC AUTOMATED: CPT | Performed by: INTERNAL MEDICINE

## 2019-10-22 PROCEDURE — 80053 COMPREHEN METABOLIC PANEL: CPT | Performed by: INTERNAL MEDICINE

## 2019-10-22 PROCEDURE — 99239 HOSP IP/OBS DSCHRG MGMT >30: CPT | Performed by: FAMILY MEDICINE

## 2019-10-22 PROCEDURE — C9113 INJ PANTOPRAZOLE SODIUM, VIA: HCPCS | Performed by: INTERNAL MEDICINE

## 2019-10-22 PROCEDURE — 80074 ACUTE HEPATITIS PANEL: CPT | Performed by: FAMILY MEDICINE

## 2019-10-22 PROCEDURE — 82607 VITAMIN B-12: CPT | Performed by: FAMILY MEDICINE

## 2019-10-22 PROCEDURE — 83540 ASSAY OF IRON: CPT | Performed by: INTERNAL MEDICINE

## 2019-10-22 PROCEDURE — 83918 ORGANIC ACIDS TOTAL QUANT: CPT | Performed by: INTERNAL MEDICINE

## 2019-10-22 RX ORDER — PANTOPRAZOLE SODIUM 40 MG/1
40 TABLET, DELAYED RELEASE ORAL
Status: DISCONTINUED | OUTPATIENT
Start: 2019-10-22 | End: 2019-10-22 | Stop reason: HOSPADM

## 2019-10-22 RX ORDER — THIAMINE MONONITRATE (VIT B1) 100 MG
100 TABLET ORAL DAILY
Status: DISCONTINUED | OUTPATIENT
Start: 2019-10-22 | End: 2019-10-22 | Stop reason: HOSPADM

## 2019-10-22 RX ORDER — SUCRALFATE ORAL 1 G/10ML
1000 SUSPENSION ORAL EVERY 6 HOURS SCHEDULED
Status: DISCONTINUED | OUTPATIENT
Start: 2019-10-22 | End: 2019-10-22 | Stop reason: HOSPADM

## 2019-10-22 RX ORDER — ASCORBIC ACID 500 MG
500 TABLET ORAL DAILY
Qty: 30 TABLET | Refills: 0 | Status: SHIPPED | OUTPATIENT
Start: 2019-10-23

## 2019-10-22 RX ORDER — LANOLIN ALCOHOL/MO/W.PET/CERES
100 CREAM (GRAM) TOPICAL DAILY
Qty: 30 TABLET | Refills: 0 | Status: SHIPPED | OUTPATIENT
Start: 2019-10-23

## 2019-10-22 RX ORDER — POTASSIUM CHLORIDE 20 MEQ/1
20 TABLET, EXTENDED RELEASE ORAL DAILY
Qty: 3 TABLET | Refills: 0 | Status: SHIPPED | OUTPATIENT
Start: 2019-10-23 | End: 2019-10-26

## 2019-10-22 RX ORDER — SODIUM CHLORIDE 1000 MG
1 TABLET, SOLUBLE MISCELLANEOUS
Qty: 90 TABLET | Refills: 0 | Status: SHIPPED | OUTPATIENT
Start: 2019-10-22

## 2019-10-22 RX ORDER — POTASSIUM CHLORIDE 20 MEQ/1
20 TABLET, EXTENDED RELEASE ORAL DAILY
Status: DISCONTINUED | OUTPATIENT
Start: 2019-10-22 | End: 2019-10-22 | Stop reason: HOSPADM

## 2019-10-22 RX ORDER — SUCRALFATE ORAL 1 G/10ML
1000 SUSPENSION ORAL EVERY 6 HOURS SCHEDULED
Qty: 420 ML | Refills: 0 | Status: SHIPPED | OUTPATIENT
Start: 2019-10-22

## 2019-10-22 RX ORDER — FERROUS SULFATE 325(65) MG
325 TABLET ORAL
Qty: 30 TABLET | Refills: 0 | Status: SHIPPED | OUTPATIENT
Start: 2019-10-23

## 2019-10-22 RX ORDER — PANTOPRAZOLE SODIUM 40 MG/1
40 TABLET, DELAYED RELEASE ORAL
Qty: 60 TABLET | Refills: 0 | Status: SHIPPED | OUTPATIENT
Start: 2019-10-22

## 2019-10-22 RX ORDER — SODIUM CHLORIDE 1000 MG
1 TABLET, SOLUBLE MISCELLANEOUS
Status: DISCONTINUED | OUTPATIENT
Start: 2019-10-22 | End: 2019-10-22 | Stop reason: HOSPADM

## 2019-10-22 RX ADMIN — FERROUS SULFATE TAB 325 MG (65 MG ELEMENTAL FE) 325 MG: 325 (65 FE) TAB at 08:21

## 2019-10-22 RX ADMIN — SODIUM CHLORIDE TAB 1 GM 1 G: 1 TAB at 08:55

## 2019-10-22 RX ADMIN — SUCRALFATE 1000 MG: 1 SUSPENSION ORAL at 08:47

## 2019-10-22 RX ADMIN — ATORVASTATIN CALCIUM 40 MG: 40 TABLET, FILM COATED ORAL at 08:21

## 2019-10-22 RX ADMIN — PANTOPRAZOLE SODIUM 40 MG: 40 INJECTION, POWDER, FOR SOLUTION INTRAVENOUS at 08:21

## 2019-10-22 RX ADMIN — OXYCODONE HYDROCHLORIDE AND ACETAMINOPHEN 500 MG: 500 TABLET ORAL at 08:20

## 2019-10-22 RX ADMIN — LEVOTHYROXINE SODIUM 25 MCG: 25 TABLET ORAL at 05:24

## 2019-10-22 RX ADMIN — SODIUM CHLORIDE, SODIUM GLUCONATE, SODIUM ACETATE, POTASSIUM CHLORIDE AND MAGNESIUM CHLORIDE 100 ML/HR: 526; 502; 368; 37; 30 INJECTION, SOLUTION INTRAVENOUS at 04:51

## 2019-10-22 RX ADMIN — POTASSIUM CHLORIDE 20 MEQ: 1500 TABLET, EXTENDED RELEASE ORAL at 08:55

## 2019-10-22 RX ADMIN — THIAMINE HCL TAB 100 MG 100 MG: 100 TAB at 08:55

## 2019-10-22 RX ADMIN — SODIUM CHLORIDE TAB 1 GM 1 G: 1 TAB at 11:16

## 2019-10-22 RX ADMIN — SUCRALFATE 1000 MG: 1 SUSPENSION ORAL at 11:16

## 2019-10-22 NOTE — ASSESSMENT & PLAN NOTE
History of bipolar disorder present time patient is cooperative and pleasant patient is on Haldol once a day but patient's QT was prolonged at 5:12 a m  On admission  Will recheck now post supplementation and alcohol abstinence also will ask Psychiatry to see if med needs to be changed  QTC is 499 I spoke to Psychiatry to continue Haldol she let the psychiatrist over patient no as well she has been held off all long time

## 2019-10-22 NOTE — SOCIAL WORK
LOS: 2 GMLOS: 2 6    Pt is preferring to return home  Eulalia Ibrahim from [facility] providid pt w/ [facility] OP number for pt to f/u with  SW met w/ pt to present w/ and explain IMM #2 pt understood and had no questions or concerns  Pt signed  SW provided pt w/ copy of signed IMM #2  SW placed original in scan bin to be scanned into EHR

## 2019-10-22 NOTE — NURSING NOTE
RN received AVS and DC medications with patient  Explained importance of each medication and f/u blood work  Patient verbalized understanding of same  Will monitor until DC

## 2019-10-22 NOTE — ASSESSMENT & PLAN NOTE
Patient with history of alcohol abuse during daily basis  This morning before coming to ER she had 2 large glass of vodka  Thiamine leak acid  Patient at present time alert oriented  CIWA protocol continue no utilization  Once stable will consider alcoholic rehab will discuss with the social Work if she is able to be discharged to rehab today  Patient alert oriented no evidence of DTs

## 2019-10-22 NOTE — ASSESSMENT & PLAN NOTE
Improved was supplementation secondary to poor p  O  Intake and had vomiting  Will also place her on KCl 20 mg p  O  Daily  As the potassium 3 4

## 2019-10-22 NOTE — PLAN OF CARE
Problem: Potential for Falls  Goal: Patient will remain free of falls  Description  INTERVENTIONS:  - Assess patient frequently for physical needs  -  Identify cognitive and physical deficits and behaviors that affect risk of falls  -  [place] fall precautions as indicated by assessment   - Educate patient/family on patient safety including physical limitations  - Instruct patient to call for assistance with activity based on assessment  - Modify environment to reduce risk of injury  - Consider OT/PT consult to assist with strengthening/mobility  Outcome: Progressing     Problem: Prexisting or High Potential for Compromised Skin Integrity  Goal: Skin integrity is maintained or improved  Description  INTERVENTIONS:  - Identify patients at risk for skin breakdown  - Assess and monitor skin integrity  - Assess and monitor nutrition and hydration status  - Monitor labs   - Assess for incontinence   - Turn and reposition patient  - Assist with mobility/ambulation  - Relieve pressure over bony prominences  - Avoid friction and shearing  - Provide appropriate hygiene as needed including keeping skin clean and dry  - Evaluate need for skin moisturizer/barrier cream  - Collaborate with interdisciplinary team   - Patient/family teaching  - Consider wound care consult   Outcome: Progressing     Problem: Nutrition/Hydration-ADULT  Goal: Nutrient/Hydration intake appropriate for improving, restoring or maintaining nutritional needs  Description  Monitor and assess patient's nutrition/hydration status for malnutrition  Collaborate with interdisciplinary team and initiate plan and interventions as ordered  Monitor patient's weight and dietary intake as ordered or per policy  Utilize nutrition screening tool and intervene as necessary  Determine patient's food preferences and provide high-protein, high-caloric foods as appropriate       INTERVENTIONS:  - Monitor oral intake, urinary output, labs, and treatment plans  - Assess nutrition and hydration status and recommend course of action  - Evaluate amount of meals eaten  - Assist patient with eating if necessary   - Allow adequate time for meals  - Recommend/ encourage appropriate diets, oral nutritional supplements, and vitamin/mineral supplements  - Order, calculate, and assess calorie counts as needed  - Recommend, monitor, and adjust tube feedings and TPN/PPN based on assessed needs  - Assess need for intravenous fluids  - Provide specific nutrition/hydration education as appropriate  - Include patient/family/caregiver in decisions related to nutrition  Outcome: Progressing     Problem: INFECTION - ADULT  Goal: Absence or prevention of progression during hospitalization  Description  INTERVENTIONS:  - Assess and monitor for signs and symptoms of infection  - Monitor lab/diagnostic results  - Monitor all insertion sites, i e  indwelling lines, tubes, and drains  - Monitor endotracheal if appropriate and nasal secretions for changes in amount and color  - [place] appropriate cooling/warming therapies per order  - Administer medications as ordered  - Instruct and encourage patient and family to use good hand hygiene technique  - Identify and instruct in appropriate isolation precautions for identified infection/condition  Outcome: Progressing  Goal: Absence of fever/infection during neutropenic period  Description  INTERVENTIONS:  - Monitor WBC    Outcome: Progressing     Problem: DISCHARGE PLANNING  Goal: Discharge to home or other facility with appropriate resources  Description  INTERVENTIONS:  - Identify barriers to discharge w/patient and caregiver  - Arrange for needed discharge resources and transportation as appropriate  - Identify discharge learning needs (meds, wound care, etc )  - Arrange for interpretive services to assist at discharge as needed  - Refer to Case Management Department for coordinating discharge planning if the patient needs post-hospital services based on physician/advanced practitioner order or complex needs related to functional status, cognitive ability, or social support system  Outcome: Progressing     Problem: Knowledge Deficit  Goal: Patient/family/caregiver demonstrates understanding of disease process, treatment plan, medications, and discharge instructions  Description  Complete learning assessment and assess knowledge base    Interventions:  - Provide teaching at level of understanding  - Provide teaching via preferred learning methods  Outcome: Progressing

## 2019-10-22 NOTE — NURSING NOTE
Patient tolerated diet at breakfast, ate 100%  No nausea, no vomiting  No abdominal pain  Call bell in reach, will monitor

## 2019-10-22 NOTE — ASSESSMENT & PLAN NOTE
Stool positive for occult blood  History of diarrhea for 6 months black stool  Hemoglobin hematocrit low 6 4  Blood transfusion 2 units packed cell  GI consult consider EGD rule out esophageal varesis or gastritis or duodenal ulcer bleed or gastritis- s/po egd- Melena/coffee-ground emesis in the setting of alcohol abuse and bulimia  Status post EGD 10/21 revealing severe esophagitis, abnormal gastric mucosa, mild portal hypertensive gastropathy  Hemoglobin is stable continue iron  Also PPI p o  B i d  And Carafate  No plan for colonoscopy this admission      Patient with spider angioma get ultrasound or CT of liver CC is evidence of cirrhosis liver serologies ordered

## 2019-10-22 NOTE — PROGRESS NOTES
Progress Note - Isis Fernandez 1965, 53 y o  female MRN: [id number]    Unit/Bed#: 7T [facility] 712-02 Encounter: [id number]    Primary Care Provider: No primary care provider on file  Date and time admitted to hospital: 10/20/2019  3:40 PM        Alcoholic cirrhosis of liver without ascites ([facility] [address] )  Assessment & Plan  · Serologies pending-> for further assessment  Patient is to remain free from alcohol  Will discuss with social work if there is availability of a rehab placement today she will need to follow up with further GI as an outpatient  She does not have any ascites EGD report see above  Hyponatremia  Assessment & Plan  · Suspect secondary to alcohol use  Discontinue IV fluids  Start her on salt tabs 1 g p o  T i d  She started eating  Hypokalemia  Assessment & Plan  Improved was supplementation secondary to poor p  O  Intake and had vomiting  Will also place her on KCl 20 mg p  O  Daily  As the potassium 3 4  GI bleed  Assessment & Plan  Stool positive for occult blood  History of diarrhea for 6 months black stool  Hemoglobin hematocrit low 6 4  Blood transfusion 2 units packed cell  GI consult consider EGD rule out esophageal varesis or gastritis or duodenal ulcer bleed or gastritis- s/po egd- Melena/coffee-ground emesis in the setting of alcohol abuse and bulimia  Status post EGD 10/21 revealing severe esophagitis, abnormal gastric mucosa, mild portal hypertensive gastropathy  Hemoglobin is stable continue iron  Also PPI p o  B i d  And Carafate  No plan for colonoscopy this admission      Patient with spider angioma get ultrasound or CT of liver CC is evidence of cirrhosis liver serologies ordered          Tobacco abuse  Assessment & Plan  History smoking counseling done  Patch applied      Alcohol abuse  Assessment & Plan  Patient with history of alcohol abuse during daily basis  This morning before coming to ER she had 2 large glass of vodka  Thiamine leak acid  Patient at present time alert oriented  CIWA protocol continue no utilization  Once stable will consider alcoholic rehab will discuss with the social Work if she is able to be discharged to rehab today  Patient alert oriented no evidence of DTs    Alcohol intoxication ([address] )  Assessment & Plan  History of alcoholism  CIWA protocol has not utilized since admission discontinue  No evidence of DTs    Bipolar disorder [facility])  Assessment & Plan  History of bipolar disorder present time patient is cooperative and pleasant patient is on Haldol once a day but patient's QT was prolonged at 5:12 a m  On admission  Will recheck now post supplementation and alcohol abstinence also will ask Psychiatry to see if med needs to be changed  * Symptomatic anemia  Assessment & Plan  Blood transfusion  Iron and B12 panel hemoglobin 7 6 post 2 unit transfusion hemoglobin improved to above 8 today  She is on iron see EGD as above  VTE Pharmacologic Prophylaxis:   Pharmacologic: Pharmacologic VTE Prophylaxis contraindicated due to gi bleed  Mechanical VTE Prophylaxis in Place: Yes    Patient Centered Rounds: I have performed bedside rounds with nursing staff today  Discussions with Specialists or Other Care Team Provider: gi     Education and Discussions with Family / Patient: patient     Time Spent for Care: 30 minutes  More than 50% of total time spent on counseling and coordination of care as described above      Current Length of Stay: 2 day(s)    Current Patient Status: Inpatient   Certification Statement: The patient will continue to require additional inpatient hospital stay due to Evaluation by social work of ability to be placed to rehab otherwise will be discharged home later today    Discharge Plan:  Either rehab or home    Code Status: Level 1 - Full Code      Subjective:   Patient seen and examined, denies any chest pain or shortness of breath has started eating today ate breakfast denies any bleeding denies any abdominal pain    Objective:     Vitals:   Temp (24hrs), Av 4 °F (36 9 °C), Min:97 5 °F (36 4 °C), Max:99 7 °F (37 6 °C)    Temp:  [97 5 °F (36 4 °C)-99 7 °F (37 6 °C)] 97 5 °F (36 4 °C)  HR:  [] 102  Resp:  [14-29] 20  BP: ()/(46-77) 111/77  SpO2:  [92 %-97 %] 93 %  Body mass index is 23 2 kg/m²  Input and Output Summary (last 24 hours): Intake/Output Summary (Last 24 hours) at 10/22/2019 0901  Last data filed at 10/22/2019 8485  Gross per 24 hour   Intake 4149 33 ml   Output 2250 ml   Net 1899 33 ml       Physical Exam:     Physical Exam   Constitutional: She is oriented to person, place, and time  She appears well-developed and well-nourished  HENT:   Head: Normocephalic and atraumatic  Eyes: Pupils are equal, round, and reactive to light  EOM are normal    Neck: Normal range of motion  Cardiovascular: Normal rate, regular rhythm and normal heart sounds  Pulmonary/Chest: Effort normal and breath sounds normal  No stridor  No respiratory distress  She has no wheezes  Abdominal: Soft  Bowel sounds are normal    Musculoskeletal: Normal range of motion  She exhibits no edema  Neurological: She is alert and oriented to person, place, and time  She has normal reflexes  cranial nerve 2-12 are normal   Normal neurological exam   Skin: Skin is warm  Psychiatric: She has a normal mood and affect           Additional Data:     Labs:    Results from last 7 days   Lab Units 10/22/19  0524 10/21/19  0551   WBC Thousand/uL 8 50 9 50   HEMOGLOBIN g/dL 8 2* 7 6*   HEMATOCRIT % 24 0* 21 9*   PLATELETS Thousands/uL 141* 157   NEUTROS PCT %  --  64   LYMPHS PCT %  --  18*   MONOS PCT %  --  17*   EOS PCT %  --  1     Results from last 7 days   Lab Units 10/22/19  0524   SODIUM mmol/L 133*   POTASSIUM mmol/L 3 4*   CHLORIDE mmol/L 100   CO2 mmol/L 28   BUN mg/dL 4*   CREATININE mg/dL 0 48*   ANION GAP mmol/L 5   CALCIUM mg/dL 7 5*   ALBUMIN g/dL 2 8*   TOTAL BILIRUBIN mg/dL 1 40*   ALK PHOS U/L 207*   ALT U/L 40   AST U/L 110*   GLUCOSE RANDOM mg/dL 101*     Results from last 7 days   Lab Units 10/21/19  0551   INR  1 15*             Results from last 7 days   Lab Units 10/21/19  0551 10/21/19  0219 10/21/19  0017 10/20/19  2209 10/20/19  1928 10/20/19  1746   LACTIC ACID mmol/L  --  0 6* 2 3* 3 2* 4 2* 4 4*   PROCALCITONIN ng/ml 0 77*  --   --   --   --  0 79*           * I Have Reviewed All Lab Data Listed Above  * Additional Pertinent Lab Tests Reviewed: All Labs Within Last 24 Hours Reviewed    Imaging:    Imaging Reports Reviewed Today Include: none today   Imaging Personally Reviewed by Myself Includes:      Recent Cultures (last 7 days):     Results from last 7 days   Lab Units 10/20/19  1615   URINE CULTURE  20,000-29,000 cfu/ml        Last 24 Hours Medication List:     Current Facility-Administered Medications:  ascorbic acid 500 mg Oral Daily Keyona Albarran MD   atorvastatin 40 mg Oral Daily Keyona Albarran MD   ferrous sulfate 325 mg Oral Daily With Breakfast Keyona Albarran MD   levothyroxine 25 mcg Oral Daily Keyona Albarran MD   pantoprazole 40 mg Oral BID AC Amee Joseph PA-C   potassium chloride 20 mEq Oral Daily Aaliyah Johnson MD   sodium chloride 1 g Oral TID With Meals Aaliyah Johnson MD   sucralfate 1,000 mg Oral Q6H [address]   thiamine 100 mg Oral Daily Aaliyah Johnson MD        Today, Patient Was Seen By: Aaliyah Johnson MD    ** Please Note: Dictation voice to text software may have been used in the creation of this document   **

## 2019-10-22 NOTE — SOCIAL WORK
Call received from [facility] confirming they completed assessment and are recommending detox  Depending on facility she discharges to, pt may need Critical access hospital funding for payment  SW faxed H&P and med list to [facility]  SW will continue to update them with anticipated discharge date for rehab placement

## 2019-10-22 NOTE — ASSESSMENT & PLAN NOTE
· Serologies pending-> for further assessment  Patient is to remain free from alcohol  Will discuss with social work if there is availability of a rehab placement today she will need to follow up with further GI as an outpatient  She does not have any ascites EGD report see above    Follow up with GI outpatient

## 2019-10-22 NOTE — PROGRESS NOTES
Patient Name: Cyril Miller  Patient MRN: [id number]  Date: 10/22/19  Service: [facility]    Subjective   Patient is feeling well this morning  Finished breakfast without difficulty  Denies abdominal pain or heartburn  Bowel movement overnight the patient did not see contents of bedpan  "Brown loose" BM documented in epic flow sheet x2  Vitals  Blood pressure 111/77, pulse 102, temperature 97 5 °F (36 4 °C), temperature source Temporal, resp  rate 20, height 5' 7" (1 702 m), weight 67 2 kg (148 lb 2 4 oz), SpO2 93 %  Physical Exam:     General Appearance:    Awake, alert, oriented x3, no distress, well developed, well    nourished   Head:    Normocephalic without obvious abnormality   Eyes:    PERRL, conjunctiva/corneas clear, EOM's intact        Neck:   Supple, no adenopathy   Throat:   Mucous membranes moist   Lungs:     Clear to auscultation bilaterally, no wheezing or rhonchi   Heart:    Regular rate and rhythm, S1 and S2 normal, no murmur   Abdomen:     Soft, non-tender, non-distended  bowel sounds active  No    masses, rebound or guarding  Extremities:   Extremities without edema   Psych  Derm:   Anxious    No jaundice  + spider angiomata + palmar erythema   Neurologic:   CNII-XII grossly intact   Speech intact         Laboratory Studies  Results from last 7 days   Lab Units 10/22/19  0524 10/21/19  0551 10/20/19  1619 10/20/19  1618   WBC Thousand/uL 8 50 9 50 17 40*  --    HEMOGLOBIN g/dL 8 2* 7 6* 6 9*  --    HEMATOCRIT % 24 0* 21 9* 20 9*  --    PLATELETS Thousands/uL 141* 157 284  --    INR   --  1 15*  --  1 07     Results from last 7 days   Lab Units 10/22/19  0524 10/21/19  0551 10/21/19  0219 10/20/19  1618   SODIUM mmol/L 133* 134*  --  133*   POTASSIUM mmol/L 3 4* 3 2* 2 5* 2 3*   CHLORIDE mmol/L 100 97  --  88*   CO2 mmol/L 28 29  --  29   BUN mg/dL 4* 13  --  23   CREATININE mg/dL 0 48* 0 52*  --  0 60   CALCIUM mg/dL 7 5* 7 4*  --  8 9   ALBUMIN g/dL 2 8* 2 8*  -- 3  7   TOTAL BILIRUBIN mg/dL 1 40* 1 70*  --  1 20   ALK PHOS U/L 207* 186*  --  230*   ALT U/L 40 48  --  51   AST U/L 110* 155*  --  203*         Imaging and Other Studies      Inhouse Medications     Current Facility-Administered Medications:     ascorbic acid (VITAMIN C) tablet 500 mg, 500 mg, Oral, Daily    atorvastatin (LIPITOR) tablet 40 mg, 40 mg, Oral, Daily, 40 mg at 10/21/19 1034    cefTRIAXone (ROCEPHIN) IVPB (premix) 2,000 mg, 2,000 mg, Intravenous, Q24H, 2,000 mg at 10/21/19 1905    ferrous sulfate tablet 325 mg, 325 mg, Oral, Daily With Breakfast    folic acid 1 mg, thiamine (VITAMIN B1) 100 mg in sodium chloride 0 9 % 100 mL IV piggyback, , Intravenous, Daily, Stopped at 10/21/19 1223    levothyroxine tablet 25 mcg, 25 mcg, Oral, Daily, 25 mcg at 10/22/19 0524    multi-electrolyte (PLASMALYTE-A/ISOLYTE-S PH 7 4) IV solution, 100 mL/hr, Intravenous, Continuous, 100 mL/hr at 10/22/19 0451    pantoprazole (PROTONIX) injection 40 mg, 40 mg, Intravenous, Q12H [facility], 40 mg at 10/21/19 2204      Assessment/Plan:    1  Melena/coffee-ground emesis in the setting of alcohol abuse and bulimia  Status post EGD 10/21 revealing severe esophagitis, abnormal gastric mucosa, mild portal hypertensive gastropathy  No gastric or esophageal varices noted  Pathology pending R/O Meade's  Continue PPI Q12H  Will add Carafate suspension  Continued nonulcergenic diet as tolerated  2  Acute blood loss anemia s/p transfusion  Bone marrow suppression related to alcohol also likely  iron panel pending  Continue to monitor H/H, transfuse as needed  3  Alcohol-related cirrhosis (new diagnosis) - elevated liver enzymes, thrombocytopenia, mild coagulopathy, hypoalbuminemia  CT shows nodularity and hepatosplenomegaly with small amount of ascites  Recommend alcohol abstinence/rehab on avoiding hepatotoxic medications  Check other liver serologies for completeness  4  Hypokalemia - correction per slim    5  Adrenal adenoma noted on imaging 2012  Adrenals normal on 10/21 CT    6  Tobacco abuse - recommend cessation      Leroy Young PA-C

## 2019-10-22 NOTE — ASSESSMENT & PLAN NOTE
Stool positive for occult blood  History of diarrhea for 6 months black stool  Hemoglobin hematocrit low 6 4  Blood transfusion 2 units packed cell  GI consult consider EGD rule out esophageal varesis or gastritis or duodenal ulcer bleed or gastritis- s/po egd- Melena/coffee-ground emesis in the setting of alcohol abuse and bulimia  Status post EGD 10/21 revealing severe esophagitis, abnormal gastric mucosa, mild portal hypertensive gastropathy  Hemoglobin is stable continue iron  Also PPI p o  B i d  And Carafate  No plan for colonoscopy this admission      Patient with spider angioma get ultrasound or CT of liver CC is evidence of cirrhosis liver serologies ordered  CBC in a week

## 2019-10-22 NOTE — ASSESSMENT & PLAN NOTE
Patient with history of alcohol abuse during daily basis  This morning before coming to ER she had 2 large glass of vodka  Thiamine leak acid  Patient at present time alert oriented  CIWA protocol continue no utilization  Once stable will consider alcoholic rehab will discuss with the social Work if she is able to be discharged to rehab today- patient will be discharged home today she wants to spend time with her sister the host will follow up tomorrow in terms of the rehab    Patient alert oriented no evidence of DTs

## 2019-10-22 NOTE — ASSESSMENT & PLAN NOTE
History of alcoholism  CIWA protocol has not utilized since admission discontinue    No evidence of DTs

## 2019-10-22 NOTE — ASSESSMENT & PLAN NOTE
Improved was supplementation secondary to poor p  O  Intake and had vomiting  Will also place her on KCl 20 mg p  O  Daily  As the potassium 3 4  I will continue KCl for 3 days as an outpatient she repeat a BMP in 1 week discussed with her to continue good diet

## 2019-10-22 NOTE — DISCHARGE SUMMARY
Discharge- Chip Dapper 1965, 53 y o  female MRN: [id number]    Unit/Bed#: 7T [facility] 712-02 Encounter: [id number]    Primary Care Provider: No primary care provider on file  Date and time admitted to hospital: 10/20/2019  3:40 PM        Alcoholic cirrhosis of liver without ascites ([facility] [address] )  Assessment & Plan  · Serologies pending-> for further assessment  Patient is to remain free from alcohol  Will discuss with social work if there is availability of a rehab placement today she will need to follow up with further GI as an outpatient  She does not have any ascites EGD report see above  Follow up with GI outpatient    Hyponatremia  Assessment & Plan  · Suspect secondary to alcohol use  Discontinue IV fluids  Start her on salt tabs 1 g p o  T i d  She started eating  Will continue as an outpatient and BMP in 1 week also discussed to continue taking good solute diet  Hypokalemia  Assessment & Plan  Improved was supplementation secondary to poor p  O  Intake and had vomiting  Will also place her on KCl 20 mg p  O  Daily  As the potassium 3 4  I will continue KCl for 3 days as an outpatient she repeat a BMP in 1 week discussed with her to continue good diet  GI bleed  Assessment & Plan  Stool positive for occult blood  History of diarrhea for 6 months black stool  Hemoglobin hematocrit low 6 4  Blood transfusion 2 units packed cell  GI consult consider EGD rule out esophageal varesis or gastritis or duodenal ulcer bleed or gastritis- s/po egd- Melena/coffee-ground emesis in the setting of alcohol abuse and bulimia  Status post EGD 10/21 revealing severe esophagitis, abnormal gastric mucosa, mild portal hypertensive gastropathy  Hemoglobin is stable continue iron  Also PPI p o  B i d  And Carafate  No plan for colonoscopy this admission      Patient with spider angioma get ultrasound or CT of liver CC is evidence of cirrhosis liver serologies ordered  CBC in a week          Tobacco abuse  Assessment & Plan  History smoking counseling done  Patch applied      Alcohol abuse  Assessment & Plan  Patient with history of alcohol abuse during daily basis  This morning before coming to ER she had 2 large glass of vodka  Thiamine leak acid  Patient at present time alert oriented  CIWA protocol continue no utilization  Once stable will consider alcoholic rehab will discuss with the social Work if she is able to be discharged to rehab today- patient will be discharged home today she wants to spend time with her sister the host will follow up tomorrow in terms of the rehab  Patient alert oriented no evidence of DTs    Alcohol intoxication [facility])  Assessment & Plan  History of alcoholism  CIWA protocol has not utilized since admission discontinue  No evidence of DTs    Bipolar disorder [facility])  Assessment & Plan  History of bipolar disorder present time patient is cooperative and pleasant patient is on Haldol once a day but patient's QT was prolonged at 5:12 a m  On admission  Will recheck now post supplementation and alcohol abstinence also will ask Psychiatry to see if med needs to be changed  QTC is 499 I spoke to Psychiatry to continue Haldol she let the psychiatrist over patient no as well she has been held off all long time  * Symptomatic anemia  Assessment & Plan  Blood transfusion  Iron and B12 panel hemoglobin 7 6 post 2 unit transfusion hemoglobin improved to above 8 today  She is on iron see EGD as above  Discharging Physician / Practitioner: Bon Darby MD  PCP: No primary care provider on file    Admission Date:   Admission Orders (From admission, onward)     Ordered        10/20/19 1835  Inpatient Admission  Once         10/20/19 1752  Inpatient Admission  Once                   Discharge Date: 10/22/19    Resolved Problems  Date Reviewed: 10/22/2019    None          Consultations During Hospital Stay:  · Case management  · Gastroenterology    Procedures Performed: · EGD-severe generalized edematous eroded erythematous mucosa, severe esophagitis, mild portal hypertension    Significant Findings / Test Results:     · See above EGD results  · CT of the abdomen and pelvis heterogeneous hepatic parenchyma with nodularity suggesting underlying hepatocellular dysfunction  Past clinically  Small amount of ascites  · Chest x-ray no acute cardiopulmonary disease  · CT head is negative  · CT spine negative    Incidental Findings:   · See above     Test Results Pending at Discharge (will require follow up): · Biopsy of the EGD and autoimmune profile     Outpatient Tests Requested:  · CBC and a BMP in 1 week    Complications:  None    Reason for Admission:  Patient felt dizzy and had a fall    Hospital Course:     Umair Lino is a 53 y o  female patient who originally presented to the hospital on 10/20/2019 due to status post fall and dizziness  Patient was found to have hemoglobin of 6 and hypokalemia  Patient does drink alcohol she did have 2 shots of vodka she was admitted with the consultation to GI with EGD done with results above  She was transfused hemoglobin remained stable at 8 2 on day of discharge her electrolytes improved and her diet actually improved on day of discharge  She has evaluated by post she was actually discharged home and then to spend time with her sister and then re-evaluated tomorrow to see placement to rehab  Patient's vitals were stable  She was placed on iron vitamin BC patient was placed on sodium tabs till her diet is going to be stable hopefully she avoids alcohol she will have a BMP repeat in 1 week CBC repeat in 1 week follow-up with Gastroenterology as well  Please see above list of diagnoses and related plan for additional information       Condition at Discharge: stable     Discharge Day Visit / Exam:     * Please refer to separate progress note for these details *    Discussion with Family: patient     Discharge instructions/Information to patient and family:   See after visit summary for information provided to patient and family  Provisions for Follow-Up Care:  See after visit summary for information related to follow-up care and any pertinent home health orders  Disposition:     Home    For Discharges to [address] SN[facility]:   · Not Applicable to this Patient - Not Applicable to this Patient    Planned Readmission: no      Discharge Statement:  I spent >35 minutes discharging the patient  This time was spent on the day of discharge  I had direct contact with the patient on the day of discharge  Greater than 50% of the total time was spent examining patient, answering all patient questions, arranging and discussing plan of care with patient as well as directly providing post-discharge instructions  Additional time then spent on discharge activities  Discharge Medications:  See after visit summary for reconciled discharge medications provided to patient and family        ** Please Note: This note has been constructed using a voice recognition system **

## 2019-10-22 NOTE — ASSESSMENT & PLAN NOTE
· Suspect secondary to alcohol use  Discontinue IV fluids  Start her on salt tabs 1 g p o  T i d  She started eating

## 2019-10-22 NOTE — CONSULTS
[address] [facility]  Initial Evaluation      Patient Name: Lawrence Bess  MRN: [id number]  DOS: 10/22/19     Consulted requested by:  Mireya Burr MD  Reason for request: medication adjustment    (Source of information: patient, chart review)    HPI: Lawrence Bess  is a 53 y o   female  with a self reported h/o bipolar d/o, known alcohol dependence with alcoholic cirrhosis of the liver who was admitted due to GI bleed in the context of alcohol use  Consult was requested due to patient being on Haldol 10mg po qhs at baseline but QTc was 512  Patient reports symptoms have been well controlled and she has been on this medication for several years  No delusions or internal preoccupation elicited  Denies SI/HI  Calm and cooperative  Reports she has been cooperative with medications and outpatient care  PPHx:  follows with Dr Melodie Holt through [facility] outpatient mental health clinic; long h/o alcohol dependence  Used to be on haldol decanoate but switched to PO meds due to lack of transportation to clinic       PMHx/PSHx: Reviewed    Psychotropic Medications: haldol 10mg po qhs    Current Facility-Administered Medications:     ascorbic acid (VITAMIN C) tablet 500 mg, 500 mg, Oral, Daily, Theresa Myles MD, 500 mg at 10/22/19 0820    atorvastatin (LIPITOR) tablet 40 mg, 40 mg, Oral, Daily, Theresa Myles MD, 40 mg at 10/22/19 3375    ferrous sulfate tablet 325 mg, 325 mg, Oral, Daily With Breakfast, Theresa Myles MD, 325 mg at 10/22/19 2652    levothyroxine tablet 25 mcg, 25 mcg, Oral, Daily, Theresa Myles MD, 25 mcg at 10/22/19 0524    pantoprazole (PROTONIX) EC tablet 40 mg, 40 mg, Oral, BID AC, Amee Joseph PA-C    potassium chloride (K-DUR,KLOR-CON) CR tablet 20 mEq, 20 mEq, Oral, Daily, Uriel See MD, 20 mEq at 10/22/19 0855    sodium chloride tablet 1 g, 1 g, Oral, TID With Meals, Uriel See MD, 1 g at 10/22/19 1116    sucralfate (CARAFATE) oral suspension 1,000 mg, 1,000 mg, Oral, Q6H [facility], Amee Joseph PA-C, 1,000 mg at 10/22/19 1116    thiamine (VITAMIN B1) tablet 100 mg, 100 mg, Oral, Daily, Stephani Szymanski MD, 100 mg at 10/22/19 0855                Allergies: Allergies   Allergen Reactions    Erythromycin Rash     Reaction noted 30 yrs ago    Ibuprofen     Naproxen        Social History: stable domicile    Family history: noncontributory    Examination:    Vitals:    10/22/19 0739   BP: 111/77   Pulse: 102   Resp: 20   Temp: 97 5 °F (36 4 °C)   SpO2: 93%       Mental Status Exam [Per above +]  Appearance: fair grooming/hygiene; no abnormal involuntary movements noted  Behavior: calm, cooperative, attentive  Speech: wnl  Mood: euthymic  Affect: neutral, stable, reactive  Thought process: linear, logical  Thought content: no delusions elicited; denies SI/HI  Perceptual disturbances: denies AH/VH  Cognition: oriented to all domains  Insight: adequate  Judgement: fair    Lab/work up: Reviewed      ASSESSMENT:   - bipolar d/o per patient  - alcohol use d/o    Recommendations:   1  Disposition: follow up as outpatient  2  Psychopharmacologic interventions:  a  Continue haldol 10mg po qhs for now - she has been stable on this for many years and she has no other QTc prolonging medications on board and no cardiac history   b  Writer left a message informing outpatient psychiatrist so QTc can be monitored further as an outpatient  3  Work-up: none  4   Precautions: no enhanced observation    Will follow THIERRY Prieto MD  10/22/19

## 2019-10-22 NOTE — NURSING NOTE
Received pt from ICU to room 712-2 at 19:30  On arrival pt AAOx4, pleasant, denies pain  CIWA score remains 0  VSS  Pt SpO2 remains above 95% on RA  SCDs on and IV fluids infusing  On assessment lung sounds clear, S1S2, abdomen soft/nondistended/nontender with + bowel sounds, peripheral pulses +2/+2  Will continue to monitor, call bell within reach

## 2019-10-22 NOTE — ASSESSMENT & PLAN NOTE
Blood transfusion  Iron and B12 panel hemoglobin 7 6 post 2 unit transfusion hemoglobin improved to above 8 today  She is on iron see EGD as above

## 2019-10-22 NOTE — ASSESSMENT & PLAN NOTE
History of bipolar disorder present time patient is cooperative and pleasant patient is on Haldol once a day but patient's QT was prolonged at 5:12 a m  On admission  Will recheck now post supplementation and alcohol abstinence also will ask Psychiatry to see if med needs to be changed

## 2019-10-22 NOTE — ASSESSMENT & PLAN NOTE
· Suspect secondary to alcohol use  Discontinue IV fluids  Start her on salt tabs 1 g p o  T i d  She started eating  Will continue as an outpatient and BMP in 1 week also discussed to continue taking good solute diet

## 2019-10-22 NOTE — ASSESSMENT & PLAN NOTE
· Serologies pending-> for further assessment  Patient is to remain free from alcohol  Will discuss with social work if there is availability of a rehab placement today she will need to follow up with further GI as an outpatient  She does not have any ascites EGD report see above

## 2019-10-23 LAB
ATRIAL RATE: 93 BPM
P AXIS: 55 DEGREES
PR INTERVAL: 130 MS
QRS AXIS: 18 DEGREES
QRSD INTERVAL: 82 MS
QT INTERVAL: 402 MS
QTC INTERVAL: 499 MS
RYE IGE QN: NEGATIVE
T WAVE AXIS: 35 DEGREES
VENTRICULAR RATE: 93 BPM

## 2019-10-23 PROCEDURE — 93010 ELECTROCARDIOGRAM REPORT: CPT | Performed by: INTERNAL MEDICINE

## 2019-10-24 LAB
HAV IGM SER QL: NORMAL
HBV CORE IGM SER QL: NORMAL
HBV SURFACE AG SER QL: NORMAL
HCV AB SER QL: NORMAL

## 2019-10-25 LAB
METHYLMALONATE SERPL-SCNC: 50 NMOL/L (ref 0–378)
SL AMB DISCLAIMER: NORMAL

## 2019-10-26 LAB
BACTERIA BLD CULT: NORMAL
BACTERIA BLD CULT: NORMAL

## 2019-12-03 ENCOUNTER — APPOINTMENT (EMERGENCY)
Dept: CT IMAGING | Facility: HOSPITAL | Age: 54
End: 2019-12-03
Payer: MEDICARE

## 2019-12-03 ENCOUNTER — HOSPITAL ENCOUNTER (EMERGENCY)
Facility: HOSPITAL | Age: 54
Discharge: HOME | End: 2019-12-03
Attending: EMERGENCY MEDICINE
Payer: MEDICARE

## 2019-12-03 VITALS
OXYGEN SATURATION: 94 % | HEART RATE: 92 BPM | TEMPERATURE: 98.9 F | RESPIRATION RATE: 18 BRPM | SYSTOLIC BLOOD PRESSURE: 101 MMHG | WEIGHT: 203.71 LBS | BODY MASS INDEX: 31.9 KG/M2 | DIASTOLIC BLOOD PRESSURE: 58 MMHG
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
## 2019-12-03 DIAGNOSIS — R53.1 GENERALIZED WEAKNESS: Primary | ICD-10-CM

## 2019-12-03 LAB
ALBUMIN SERPL BCP-MCNC: 2.8 G/DL (ref 3–5.2)
ALP SERPL-CCNC: 192 U/L (ref 43–122)
ALT SERPL W P-5'-P-CCNC: 31 U/L (ref 9–52)
AMMONIA PLAS-SCNC: <9 UMOL/L (ref 9–33)
ANION GAP SERPL CALCULATED.3IONS-SCNC: 8 MMOL/L (ref 5–14)
ANISOCYTOSIS BLD QL SMEAR: PRESENT
APTT PPP: 29 SECONDS (ref 25–32)
AST SERPL W P-5'-P-CCNC: 56 U/L (ref 14–36)
BASOPHILS # BLD AUTO: 0.1 THOUSANDS/ΜL (ref 0–0.1)
BASOPHILS NFR BLD AUTO: 1 % (ref 0–1)
BILIRUB SERPL-MCNC: 1.9 MG/DL
BUN SERPL-MCNC: 6 MG/DL (ref 5–25)
CALCIUM SERPL-MCNC: 8.3 MG/DL (ref 8.4–10.2)
CHLORIDE SERPL-SCNC: 113 MMOL/L (ref 97–108)
CO2 SERPL-SCNC: 20 MMOL/L (ref 22–30)
CREAT SERPL-MCNC: 0.63 MG/DL (ref 0.6–1.2)
EOSINOPHIL # BLD AUTO: 0.1 THOUSAND/ΜL (ref 0–0.4)
EOSINOPHIL NFR BLD AUTO: 1 % (ref 0–6)
ERYTHROCYTE [DISTWIDTH] IN BLOOD BY AUTOMATED COUNT: 15.6 %
ETHANOL EXG-MCNC: 0 MG/DL
GFR SERPL CREATININE-BSD FRML MDRD: 101 ML/MIN/1.73SQ M
GLUCOSE SERPL-MCNC: 104 MG/DL (ref 70–99)
HCT VFR BLD AUTO: 34.4 % (ref 36–46)
HGB BLD-MCNC: 11.3 G/DL (ref 12–16)
HYPERCHROMIA BLD QL SMEAR: PRESENT
INR PPP: 1.32 (ref 0.91–1.09)
LIPASE SERPL-CCNC: 86 U/L (ref 23–300)
LYMPHOCYTES # BLD AUTO: 1.6 THOUSANDS/ΜL (ref 0.5–4)
LYMPHOCYTES NFR BLD AUTO: 14 % (ref 25–45)
MACROCYTES BLD QL AUTO: PRESENT
MCH RBC QN AUTO: 33.1 PG (ref 26–34)
MCHC RBC AUTO-ENTMCNC: 33 G/DL (ref 31–36)
MCV RBC AUTO: 100 FL (ref 80–100)
MONOCYTES # BLD AUTO: 1.2 THOUSAND/ΜL (ref 0.2–0.9)
MONOCYTES NFR BLD AUTO: 11 % (ref 1–10)
NEUTROPHILS # BLD AUTO: 8.3 THOUSANDS/ΜL (ref 1.8–7.8)
NEUTS SEG NFR BLD AUTO: 73 % (ref 45–65)
NT-PROBNP SERPL-MCNC: 446 PG/ML (ref 0–299)
PLATELET # BLD AUTO: 286 THOUSANDS/UL (ref 150–450)
PLATELET BLD QL SMEAR: ADEQUATE
PMV BLD AUTO: 8.9 FL (ref 8.9–12.7)
POTASSIUM SERPL-SCNC: 3.4 MMOL/L (ref 3.6–5)
PROT SERPL-MCNC: 7 G/DL (ref 5.9–8.4)
PROTHROMBIN TIME: 14.4 SECONDS (ref 9.8–12)
RBC # BLD AUTO: 3.43 MILLION/UL (ref 4–5.2)
RBC MORPH BLD: NORMAL
SODIUM SERPL-SCNC: 141 MMOL/L (ref 137–147)
TARGETS BLD QL SMEAR: PRESENT
TROPONIN I SERPL-MCNC: <0.01 NG/ML (ref 0–0.03)
WBC # BLD AUTO: 11.4 THOUSAND/UL (ref 4.5–11)

## 2019-12-03 PROCEDURE — 83880 ASSAY OF NATRIURETIC PEPTIDE: CPT | Performed by: EMERGENCY MEDICINE

## 2019-12-03 PROCEDURE — 85610 PROTHROMBIN TIME: CPT | Performed by: EMERGENCY MEDICINE

## 2019-12-03 PROCEDURE — 99285 EMERGENCY DEPT VISIT HI MDM: CPT

## 2019-12-03 PROCEDURE — 72125 CT NECK SPINE W/O DYE: CPT

## 2019-12-03 PROCEDURE — 84484 ASSAY OF TROPONIN QUANT: CPT | Performed by: EMERGENCY MEDICINE

## 2019-12-03 PROCEDURE — 85730 THROMBOPLASTIN TIME PARTIAL: CPT | Performed by: EMERGENCY MEDICINE

## 2019-12-03 PROCEDURE — 70450 CT HEAD/BRAIN W/O DYE: CPT

## 2019-12-03 PROCEDURE — 83690 ASSAY OF LIPASE: CPT | Performed by: EMERGENCY MEDICINE

## 2019-12-03 PROCEDURE — 80053 COMPREHEN METABOLIC PANEL: CPT | Performed by: EMERGENCY MEDICINE

## 2019-12-03 PROCEDURE — 82075 ASSAY OF BREATH ETHANOL: CPT | Performed by: EMERGENCY MEDICINE

## 2019-12-03 PROCEDURE — 85025 COMPLETE CBC W/AUTO DIFF WBC: CPT | Performed by: EMERGENCY MEDICINE

## 2019-12-03 PROCEDURE — 99284 EMERGENCY DEPT VISIT MOD MDM: CPT | Performed by: EMERGENCY MEDICINE

## 2019-12-03 PROCEDURE — 36415 COLL VENOUS BLD VENIPUNCTURE: CPT | Performed by: EMERGENCY MEDICINE

## 2019-12-03 PROCEDURE — 82140 ASSAY OF AMMONIA: CPT | Performed by: EMERGENCY MEDICINE

## 2019-12-03 NOTE — ED PROVIDER NOTES
History  Chief Complaint   Patient presents with   Migue Moose Fall     pt is a chronic drinker coming to the ED after a fall in her home, bilateral knee pain with multiple abraisions in various stages of healing   Weakness - Generalized     pt has crusted and dried lips "havnt been drinking water" abdomin distended   n      History provided by:  Patient and EMS personnel  Fall   Mechanism of injury: fall    Injury location:  Head/neck  Head/neck injury location:  Head  Incident location:  Home  Fall:     Fall occurred:  Tripped and walking    Impact surface:  Hard floor    Point of impact:  Head and knees  Associated symptoms: no abdominal pain, no chest pain, no headaches and no nausea    Associated symptoms comment:  Noted generalized weakness and unable to get up on his and their own  EMS brought the patient up  Patient is a chronic alcoholic has been admitted before for noted chronic alcoholism and anemia      Prior to Admission Medications   Prescriptions Last Dose Informant Patient Reported?  Taking?   ascorbic acid (VITAMIN C) 500 MG tablet   No No   Sig: Take 1 tablet (500 mg total) by mouth daily   atorvastatin (LIPITOR) 40 mg tablet   Yes No   Sig: Take 40 mg by mouth daily   ferrous sulfate 325 (65 Fe) mg tablet Not Taking at Unknown time  No No   Sig: Take 1 tablet (325 mg total) by mouth daily with breakfast   Patient not taking: Reported on 12/3/2019   haloperidol (HALDOL) 10 mg tablet   Yes No   Sig: One tablet at bedtime   levothyroxine 25 mcg tablet   Yes No   Si mcg    pantoprazole (PROTONIX) 40 mg tablet Not Taking at Unknown time  No No   Sig: Take 1 tablet (40 mg total) by mouth 2 (two) times a day before meals   Patient not taking: Reported on 12/3/2019   potassium chloride (K-DUR,KLOR-CON) 20 mEq tablet   No No   Sig: Take 1 tablet (20 mEq total) by mouth daily for 3 days   senna-docusate sodium (SENOKOT S) 8 6-50 mg per tablet Not Taking at Unknown time  Yes No   Sig: Twice daily PRN   sodium chloride 1 g tablet   No No   Sig: Take 1 tablet (1 g total) by mouth 3 (three) times a day with meals   sucralfate (CARAFATE) 1 g/10 mL suspension Not Taking at Unknown time  No No   Sig: Take 10 mL (1,000 mg total) by mouth every 6 (six) hours   Patient not taking: Reported on 12/3/2019   thiamine 100 MG tablet   No No   Sig: Take 1 tablet (100 mg total) by mouth daily      Facility-Administered Medications: None       Past Medical History:   Diagnosis Date    Alcohol abuse     Bulimia     COPD (chronic obstructive pulmonary disease) ([facility] [address] )     Psychiatric disorder        Past Surgical History:   Procedure Laterality Date    HYSTERECTOMY         Family History   Problem Relation Age of Onset    GI problems Neg Hx     Liver cancer Neg Hx      I have reviewed and agree with the history as documented  Social History     Tobacco Use    Smoking status: Current Every Day Smoker     Packs/day: 0 50    Smokeless tobacco: Never Used   Substance Use Topics    Alcohol use: Yes     Frequency: 4 or more times a week     Drinks per session: 1 or 2     Comment: daily    Drug use: Never        Review of Systems   Constitutional: Positive for fatigue  Negative for chills and fever  HENT: Negative for rhinorrhea, sore throat and trouble swallowing  Eyes: Negative for pain  Respiratory: Negative for cough, shortness of breath, wheezing and stridor  Cardiovascular: Negative for chest pain and leg swelling  Gastrointestinal: Negative for abdominal pain, diarrhea and nausea  Endocrine: Negative for polyuria  Genitourinary: Negative for dysuria, flank pain and urgency  Musculoskeletal: Negative for joint swelling, myalgias and neck stiffness  Skin: Negative for rash  Allergic/Immunologic: Negative for immunocompromised state  Neurological: Positive for weakness  Negative for dizziness, syncope, numbness and headaches  Psychiatric/Behavioral: Negative for confusion and suicidal ideas     All other systems reviewed and are negative  Physical Exam  Physical Exam   Constitutional: She is oriented to person, place, and time  She appears well-developed and well-nourished  HENT:   Head: Normocephalic and atraumatic  Eyes: Pupils are equal, round, and reactive to light  EOM are normal    Neck: Normal range of motion  Neck supple  Cardiovascular: Normal rate and regular rhythm  Exam reveals no friction rub  No murmur heard  Pulmonary/Chest: Breath sounds normal  No respiratory distress  She has no wheezes  She has no rales  Abdominal: Soft  Bowel sounds are normal  She exhibits no distension  There is no tenderness  Musculoskeletal: Normal range of motion  She exhibits tenderness  She exhibits no edema  Bilateral abrasions on the knees   Neurological: She is alert and oriented to person, place, and time  GCS 15  AAOx4  No focal neuro deficits  CN II-XII intact  PERRL  EOMI  Patsi Reil No pronator drift   strength 5/5 bilaterally  B/L UE strength 5/5 throughout  Finger to nose normal  Cerebellar function normal  Ambulates without difficulty  B/L LE strength 5/5 throughout  Gross sensation to b/l upper and lower extremities intact  Skin: Skin is warm  No rash noted  Nursing note and vitals reviewed        Vital Signs  ED Triage Vitals [12/03/19 1241]   Temperature Pulse Respirations Blood Pressure SpO2   98 9 °F (37 2 °C) 90 16 120/73 95 %      Temp Source Heart Rate Source Patient Position - Orthostatic VS BP Location FiO2 (%)   Tympanic Monitor Sitting Left arm --      Pain Score       Worst Possible Pain           Vitals:    12/03/19 1241 12/03/19 1456   BP: 120/73 101/58   Pulse: 90 92   Patient Position - Orthostatic VS: Sitting Lying         Visual Acuity      ED Medications  Medications - No data to display    Diagnostic Studies  Results Reviewed     Procedure Component Value Units Date/Time    POCT alcohol breath test [[id number]]  (Normal) Resulted:  12/03/19 8779    Lab Status:  Final result Updated:  12/03/19 1548     EXTBreath Alcohol 0 000    Lipase [[id number]]  (Normal) Collected:  12/03/19 1454    Lab Status:  Final result Specimen:  Blood from Arm, Left Updated:  12/03/19 1513     Lipase 86 u/L     Comprehensive metabolic panel [[id number]]  (Abnormal) Collected:  12/03/19 1454    Lab Status:  Final result Specimen:  Blood from Arm, Left Updated:  12/03/19 1513     Sodium 141 mmol/L      Potassium 3 4 mmol/L      Chloride 113 mmol/L      CO2 20 mmol/L      ANION GAP 8 mmol/L      BUN 6 mg/dL      Creatinine 0 63 mg/dL      Glucose 104 mg/dL      Calcium 8 3 mg/dL      AST 56 U/L      ALT 31 U/L      Alkaline Phosphatase 192 U/L      Total Protein 7 0 g/dL      Albumin 2 8 g/dL      Total Bilirubin 1 90 mg/dL      eGFR 101 ml/min/1 73sq m     Narrative:       [place] guidelines for Chronic Kidney Disease (CKD):     Stage 1 with normal or high GFR (GFR > 90 mL/min/1 73 square meters)    Stage 2 Mild CKD (GFR = 60-89 mL/min/1 73 square meters)    Stage 3A Moderate CKD (GFR = 45-59 mL/min/1 73 square meters)    Stage 3B Moderate CKD (GFR = 30-44 mL/min/1 73 square meters)    Stage 4 Severe CKD (GFR = 15-29 mL/min/1 73 square meters)    Stage 5 End Stage CKD (GFR <15 mL/min/1 73 square meters)  Note: GFR calculation is accurate only with a steady state creatinine    Ammonia [[id number]]  (Abnormal) Collected:  12/03/19 1334    Lab Status:  Final result Specimen:  Blood from Arm, Right Updated:  12/03/19 1411     Ammonia <9 umol/L     APTT [[id number]]  (Normal) Collected:  12/03/19 1335    Lab Status:  Final result Specimen:  Blood from Arm, Right Updated:  12/03/19 1408     PTT 29 seconds     Protime-INR [[id number]]  (Abnormal) Collected:  12/03/19 1335    Lab Status:  Final result Specimen:  Blood from Arm, Right Updated:  12/03/19 1408     Protime 14 4 seconds      INR 1 32    NT-BNP PRO [[id number]]  (Abnormal) Collected:  12/03/19 1306    Lab Status:  Final result Specimen:  Blood from Arm, Left Updated:  12/03/19 1338     NT-proBNP 446 pg/mL     Troponin I [[id number]]  (Normal) Collected:  12/03/19 1306    Lab Status:  Final result Specimen:  Blood from Arm, Left Updated:  12/03/19 1337     Troponin I <0 01 ng/mL     Narrative:       Hemolysis    CBC and differential [[id number]]  (Abnormal) Collected:  12/03/19 1306    Lab Status:  Final result Specimen:  Blood from Arm, Left Updated:  12/03/19 1314     WBC 11 40 Thousand/uL      RBC 3 43 Million/uL      Hemoglobin 11 3 g/dL      Hematocrit 34 4 %       fL      MCH 33 1 pg      MCHC 33 0 g/dL      RDW 15 6 %      MPV 8 9 fL      Platelets 917 Thousands/uL      Neutrophils Relative 73 %      Lymphocytes Relative 14 %      Monocytes Relative 11 %      Eosinophils Relative 1 %      Basophils Relative 1 %      Neutrophils Absolute 8 30 Thousands/µL      Lymphocytes Absolute 1 60 Thousands/µL      Monocytes Absolute 1 20 Thousand/µL      Eosinophils Absolute 0 10 Thousand/µL      Basophils Absolute 0 10 Thousands/µL                  CT head without contrast   Final Result by Kaleigh Sheppard MD (12/03 1408)      No acute intracranial abnormality  Workstation performed: [id number]         CT cervical spine without contrast   Final Result by Kaleigh Sheppard MD (12/03 1415)      No cervical spine fracture or traumatic malalignment  Workstation performed: [id number]                    Procedures  Procedures         ED Course  ED Course as of Dec 05 0755   Tue Dec 03, 2019   1553 Spoke with sister, will dc home, ambulatory in the ED  Pt re-examined and evaluated after testing and treatment  Spoke with the patient and feeling improved and sxs have resolved  Will discharge home with close f/u with pcp and instructed to return to the ED if sxs worsen or continue  Pt agrees with the plan for discharge and feels comfortable to go home with proper f/u   Advised to return for worsening or additional problems  Diagnostic tests were reviewed and questions answered  Diagnosis, care plan and treatment options were discussed  The patient understand instructions and will follow up as directed  Counseling: I had a detailed discussion with the patient and/or guardian regarding: the historical points, exam findings, and any diagnostic results supporting the discharge diagnosis, lab results, radiology results, discharge instructions reviewed with patient and/or family/caregiver and understanding was verbalized  Instructions given to return to the emergency department if symptoms worsen or persist, or if there are any questions or concerns that arise at home  All labs reviewed and utilized in the medical decision making process    All radiology studies independently viewed by me and interpreted by the radiologist                                         MDM  Number of Diagnoses or Management Options  Fall, initial encounter: new and requires workup  Generalized weakness: new and requires workup  Diagnosis management comments: 58-year-old female presents emergency department with chronic alcoholism and a mechanical fall at home  The patient had a trauma workup in the emergency department essentially unremarkable  Patient was ambulatory in the emergency department no difficulty her alcohol level was 0 this no signs of acute withdrawal at this point time  The plan wants to go the patient wants to go home she was ambulatory called family member to come and  the patient at this point time no indication for admission she has no evidence of anemia  The plan will be for outpatient management followup  Pt re-examined and evaluated after testing and treatment  Spoke with the patient and feeling improved and sxs have resolved  Will discharge home with close f/u with pcp and instructed to return to the ED if sxs worsen or continue   Pt agrees with the plan for discharge and feels comfortable to go home with proper f/u  Advised to return for worsening or additional problems  Diagnostic tests were reviewed and questions answered  Diagnosis, care plan and treatment options were discussed  The patient understand instructions and will follow up as directed  Counseling: I had a detailed discussion with the patient and/or guardian regarding: the historical points, exam findings, and any diagnostic results supporting the discharge diagnosis, lab results, radiology results, discharge instructions reviewed with patient and/or family/caregiver and understanding was verbalized  Instructions given to return to the emergency department if symptoms worsen or persist, or if there are any questions or concerns that arise at home  All labs reviewed and utilized in the medical decision making process    All radiology studies independently viewed by me and interpreted by the radiologist        Amount and/or Complexity of Data Reviewed  Clinical lab tests: reviewed and ordered  Tests in the radiology section of CPT®: reviewed and ordered  Tests in the medicine section of CPT®: ordered and reviewed  Review and summarize past medical records: yes  Independent visualization of images, tracings, or specimens: yes          Disposition  Final diagnoses:   Generalized weakness   Fall, initial encounter     Time reflects when diagnosis was documented in both MDM as applicable and the Disposition within this note     Time User Action Codes Description Comment    12/3/2019  3:55 PM Maribel SANTANA Add [R53 1] Generalized weakness     12/3/2019  3:55 PM Walter Pitts Add [B73  Nichportiate Juan Jose, initial encounter       ED Disposition     ED Disposition Condition Date/Time Comment    Discharge Stable Tue Dec 3, 2019  3:55 PM Patricia Saldaña discharge to home/self care              Follow-up Information    None         Discharge Medication List as of 12/3/2019  3:55 PM      CONTINUE these medications which have NOT CHANGED    Details   ascorbic acid (VITAMIN C) 500 MG tablet Take 1 tablet (500 mg total) by mouth daily, Starting Wed 10/23/2019, Print      atorvastatin (LIPITOR) 40 mg tablet Take 40 mg by mouth daily, Starting Thu 4/11/2019, Historical Med      ferrous sulfate 325 (65 Fe) mg tablet Take 1 tablet (325 mg total) by mouth daily with breakfast, Starting Wed 10/23/2019, Print      haloperidol (HALDOL) 10 mg tablet One tablet at bedtime, Historical Med      levothyroxine 25 mcg tablet 50 mcg , Historical Med      pantoprazole (PROTONIX) 40 mg tablet Take 1 tablet (40 mg total) by mouth 2 (two) times a day before meals, Starting Tue 10/22/2019, Print      potassium chloride (K-DUR,KLOR-CON) 20 mEq tablet Take 1 tablet (20 mEq total) by mouth daily for 3 days, Starting Wed 10/23/2019, Until Sat 10/26/2019, Print      senna-docusate sodium (SENOKOT S) 8 6-50 mg per tablet Twice daily PRN, Historical Med      sodium chloride 1 g tablet Take 1 tablet (1 g total) by mouth 3 (three) times a day with meals, Starting Tue 10/22/2019, Print      sucralfate (CARAFATE) 1 g/10 mL suspension Take 10 mL (1,000 mg total) by mouth every 6 (six) hours, Starting Tue 10/22/2019, Print      thiamine 100 MG tablet Take 1 tablet (100 mg total) by mouth daily, Starting Wed 10/23/2019, Print           No discharge procedures on file      ED Provider  Electronically Signed by           Susie Wu DO  12/05/19 4237

## 2019-12-03 NOTE — ED NOTES
Assist with patient to ambulate;  Patient states feeling dizzy; states feels dizzy at home also; is awaiting arrangements to get placement into a nursing home facility        Miguel Gardiner RN  12/03/19 9711

## 2022-06-22 ENCOUNTER — APPOINTMENT (EMERGENCY)
Dept: CT IMAGING | Facility: HOSPITAL | Age: 57
End: 2022-06-22
Payer: COMMERCIAL

## 2022-06-22 ENCOUNTER — HOSPITAL ENCOUNTER (EMERGENCY)
Facility: HOSPITAL | Age: 57
Discharge: HOME | End: 2022-06-23
Attending: EMERGENCY MEDICINE | Admitting: EMERGENCY MEDICINE
Payer: COMMERCIAL

## 2022-06-22 ENCOUNTER — APPOINTMENT (EMERGENCY)
Dept: NON INVASIVE DIAGNOSTICS | Facility: HOSPITAL | Age: 57
End: 2022-06-22
Payer: COMMERCIAL

## 2022-06-22 DIAGNOSIS — F10.929 ALCOHOL INTOXICATION (HCC): Primary | ICD-10-CM

## 2022-06-22 LAB
ALBUMIN SERPL BCP-MCNC: 3 G/DL (ref 3.5–5)
ALP SERPL-CCNC: 99 U/L (ref 46–116)
ALT SERPL W P-5'-P-CCNC: 20 U/L (ref 12–78)
ANION GAP SERPL CALCULATED.3IONS-SCNC: 13 MMOL/L (ref 4–13)
AST SERPL W P-5'-P-CCNC: 45 U/L (ref 5–45)
ATRIAL RATE: 91 BPM
BASOPHILS # BLD AUTO: 0.04 THOUSANDS/ΜL (ref 0–0.1)
BASOPHILS NFR BLD AUTO: 1 % (ref 0–1)
BILIRUB SERPL-MCNC: 0.43 MG/DL (ref 0.2–1)
BUN SERPL-MCNC: 5 MG/DL (ref 5–25)
CALCIUM ALBUM COR SERPL-MCNC: 9.3 MG/DL (ref 8.3–10.1)
CALCIUM SERPL-MCNC: 8.5 MG/DL (ref 8.3–10.1)
CHLORIDE SERPL-SCNC: 104 MMOL/L (ref 100–108)
CO2 SERPL-SCNC: 20 MMOL/L (ref 21–32)
CREAT SERPL-MCNC: 0.55 MG/DL (ref 0.6–1.3)
EOSINOPHIL # BLD AUTO: 0.01 THOUSAND/ΜL (ref 0–0.61)
EOSINOPHIL NFR BLD AUTO: 0 % (ref 0–6)
ERYTHROCYTE [DISTWIDTH] IN BLOOD BY AUTOMATED COUNT: 16.1 % (ref 11.6–15.1)
ETHANOL SERPL-MCNC: 192 MG/DL (ref 0–3)
FLUAV RNA RESP QL NAA+PROBE: NEGATIVE
FLUBV RNA RESP QL NAA+PROBE: NEGATIVE
GFR SERPL CREATININE-BSD FRML MDRD: 103 ML/MIN/1.73SQ M
GLUCOSE SERPL-MCNC: 86 MG/DL (ref 65–140)
HCT VFR BLD AUTO: 35 % (ref 34.8–46.1)
HGB BLD-MCNC: 11.2 G/DL (ref 11.5–15.4)
IMM GRANULOCYTES # BLD AUTO: 0.03 THOUSAND/UL (ref 0–0.2)
IMM GRANULOCYTES NFR BLD AUTO: 0 % (ref 0–2)
LYMPHOCYTES # BLD AUTO: 1.83 THOUSANDS/ΜL (ref 0.6–4.47)
LYMPHOCYTES NFR BLD AUTO: 27 % (ref 14–44)
MCH RBC QN AUTO: 30.7 PG (ref 26.8–34.3)
MCHC RBC AUTO-ENTMCNC: 32 G/DL (ref 31.4–37.4)
MCV RBC AUTO: 96 FL (ref 82–98)
MONOCYTES # BLD AUTO: 0.74 THOUSAND/ΜL (ref 0.17–1.22)
MONOCYTES NFR BLD AUTO: 11 % (ref 4–12)
NEUTROPHILS # BLD AUTO: 4.14 THOUSANDS/ΜL (ref 1.85–7.62)
NEUTS SEG NFR BLD AUTO: 61 % (ref 43–75)
NRBC BLD AUTO-RTO: 0 /100 WBCS
P AXIS: -6 DEGREES
PLATELET # BLD AUTO: 289 THOUSANDS/UL (ref 149–390)
PMV BLD AUTO: 10.1 FL (ref 8.9–12.7)
POTASSIUM SERPL-SCNC: 2.8 MMOL/L (ref 3.5–5.3)
PR INTERVAL: 134 MS
PROT SERPL-MCNC: 8.1 G/DL (ref 6.4–8.2)
QRS AXIS: 67 DEGREES
QRSD INTERVAL: 86 MS
QT INTERVAL: 368 MS
QTC INTERVAL: 452 MS
RBC # BLD AUTO: 3.65 MILLION/UL (ref 3.81–5.12)
RSV RNA RESP QL NAA+PROBE: NEGATIVE
SARS-COV-2 RNA RESP QL NAA+PROBE: NEGATIVE
SODIUM SERPL-SCNC: 137 MMOL/L (ref 136–145)
T WAVE AXIS: 48 DEGREES
TSH SERPL DL<=0.05 MIU/L-ACNC: 2 UIU/ML (ref 0.45–4.5)
VENTRICULAR RATE: 91 BPM
WBC # BLD AUTO: 6.79 THOUSAND/UL (ref 4.31–10.16)

## 2022-06-22 PROCEDURE — 70450 CT HEAD/BRAIN W/O DYE: CPT

## 2022-06-22 PROCEDURE — 96372 THER/PROPH/DIAG INJ SC/IM: CPT

## 2022-06-22 PROCEDURE — 72125 CT NECK SPINE W/O DYE: CPT

## 2022-06-22 PROCEDURE — 82077 ASSAY SPEC XCP UR&BREATH IA: CPT

## 2022-06-22 PROCEDURE — 84443 ASSAY THYROID STIM HORMONE: CPT

## 2022-06-22 PROCEDURE — 85025 COMPLETE CBC W/AUTO DIFF WBC: CPT

## 2022-06-22 PROCEDURE — 36415 COLL VENOUS BLD VENIPUNCTURE: CPT

## 2022-06-22 PROCEDURE — 93005 ELECTROCARDIOGRAM TRACING: CPT

## 2022-06-22 PROCEDURE — 99285 EMERGENCY DEPT VISIT HI MDM: CPT

## 2022-06-22 PROCEDURE — 93010 ELECTROCARDIOGRAM REPORT: CPT

## 2022-06-22 PROCEDURE — 80053 COMPREHEN METABOLIC PANEL: CPT

## 2022-06-22 PROCEDURE — 93970 EXTREMITY STUDY: CPT

## 2022-06-22 PROCEDURE — 0241U HB NFCT DS VIR RESP RNA 4 TRGT: CPT

## 2022-06-22 PROCEDURE — 80307 DRUG TEST PRSMV CHEM ANLYZR: CPT

## 2022-06-22 RX ORDER — HALOPERIDOL 5 MG/ML
5 INJECTION INTRAMUSCULAR ONCE
Status: COMPLETED | OUTPATIENT
Start: 2022-06-22 | End: 2022-06-22

## 2022-06-22 RX ORDER — BENZTROPINE MESYLATE 1 MG/ML
1 INJECTION INTRAMUSCULAR; INTRAVENOUS ONCE
Status: COMPLETED | OUTPATIENT
Start: 2022-06-22 | End: 2022-06-22

## 2022-06-22 RX ORDER — LORAZEPAM 2 MG/ML
2 INJECTION INTRAMUSCULAR ONCE
Status: COMPLETED | OUTPATIENT
Start: 2022-06-22 | End: 2022-06-22

## 2022-06-22 RX ORDER — POTASSIUM CHLORIDE 20 MEQ/1
40 TABLET, EXTENDED RELEASE ORAL ONCE
Status: COMPLETED | OUTPATIENT
Start: 2022-06-22 | End: 2022-06-22

## 2022-06-22 RX ADMIN — POTASSIUM CHLORIDE 40 MEQ: 1500 TABLET, EXTENDED RELEASE ORAL at 22:58

## 2022-06-22 RX ADMIN — BENZTROPINE MESYLATE 1 MG: 1 INJECTION INTRAMUSCULAR; INTRAVENOUS at 16:39

## 2022-06-22 RX ADMIN — HALOPERIDOL LACTATE 5 MG: 5 INJECTION, SOLUTION INTRAMUSCULAR at 16:39

## 2022-06-22 RX ADMIN — LORAZEPAM 2 MG: 2 INJECTION INTRAMUSCULAR; INTRAVENOUS at 16:44
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
## 2022-06-22 NOTE — RESTRAINT FACE TO FACE
Restraint Face to Face   Lesley Trent 61 y o  female MRN: [id number]  Unit/Bed#: ED 12 Encounter: [id number]      Physical Evaluation  Purpose for Restraints/ Seclusion High risk for self harm, High risk for harm to others and high risk for flight  Patient's reaction to the intervention: stable  Patient's medical condition: stable  Patient's Behavioral condition: stable   Restraints to be Continued
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
## 2022-06-22 NOTE — ED NOTES
Pt transported to CT via stretcher  Accompanied by KIRSTEN and Security Ivon Stratton RN  06/22/22 7425

## 2022-06-23 VITALS
HEART RATE: 90 BPM | DIASTOLIC BLOOD PRESSURE: 60 MMHG | SYSTOLIC BLOOD PRESSURE: 101 MMHG | RESPIRATION RATE: 18 BRPM | OXYGEN SATURATION: 99 % | TEMPERATURE: 98 F

## 2022-06-23 LAB
AMPHETAMINES SERPL QL SCN: NEGATIVE
BARBITURATES UR QL: NEGATIVE
BENZODIAZ UR QL: NEGATIVE
COCAINE UR QL: NEGATIVE
ETHANOL EXG-MCNC: 0 MG/DL
METHADONE UR QL: NEGATIVE
OPIATES UR QL SCN: NEGATIVE
OXYCODONE+OXYMORPHONE UR QL SCN: NEGATIVE
PCP UR QL: NEGATIVE
POTASSIUM SERPL-SCNC: 3.9 MMOL/L (ref 3.5–5.3)
THC UR QL: NEGATIVE

## 2022-06-23 PROCEDURE — 93971 EXTREMITY STUDY: CPT | Performed by: SURGERY

## 2022-06-23 PROCEDURE — 84132 ASSAY OF SERUM POTASSIUM: CPT

## 2022-06-23 PROCEDURE — 36415 COLL VENOUS BLD VENIPUNCTURE: CPT

## 2022-06-23 PROCEDURE — 82075 ASSAY OF BREATH ETHANOL: CPT
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
## 2022-06-23 NOTE — DISCHARGE INSTRUCTIONS
Please refer to the attached information for strict return instructions  If symptoms worsen or new symptoms develop please return to the ER  Please follow up with a primary care physician for re-evaluation of symptoms

## 2022-06-23 NOTE — ED NOTES
Per [facility] patient's sister she will be here in a hour with belongings       Shubham Lara RN  06/23/22 0700

## 2022-06-23 NOTE — ED NOTES
Assumed care of pt at this time  Pt has ambulated to  accompanied by ED Tech 1:1   Pt alert, questioning why she is here and what steps come next  Pt placed paper safety scrubs on, on top of her street clothes, and she states she is unwilling to change again at this time  Will re-visit changing pt entirely when she showers, and/or is cooperative, and/or the act of getting her changed will not be disruptive to the other patients in the psychiatric care area  Pt aware that her family requested her evaluation due to feeling concerned about her  Pt states threateningly "they better be worried now"  Pt admits that she is not taking her medications, and she states "they are for the crazies"  Pt understands that our Crisis team will speak with her when her alcohol level is appropriate  Pt states she plans on sleeping now, until her alcohol has decreased and she can speak with Crisis  Pt is not in restraints at this time, remains on 1:1 observation as moods and behaviors have been labile        Rin Estes RN  06/22/22 9670       Rin Estes RN  06/22/22 9396       Rin Estes RN  06/22/22 8759

## 2022-06-23 NOTE — ED CARE HANDOFF
Emergency Department Sign Out Note        Sign out and transfer of care from [facility]  See Separate Emergency Department note  The patient, Lesley Trent, was evaluated by the previous provider for alcohol intoxication, confrontational/angry behavior  Workup Completed:  Labs Reviewed   CBC AND DIFFERENTIAL - Abnormal       Result Value Ref Range Status    WBC 6 79  4 31 - 10 16 Thousand/uL Final    RBC 3 65 (*) 3 81 - 5 12 Million/uL Final    Hemoglobin 11 2 (*) 11 5 - 15 4 g/dL Final    Hematocrit 35 0  34 8 - 46 1 % Final    MCV 96  82 - 98 fL Final    MCH 30 7  26 8 - 34 3 pg Final    MCHC 32 0  31 4 - 37 4 g/dL Final    RDW 16 1 (*) 11 6 - 15 1 % Final    MPV 10 1  8 9 - 12 7 fL Final    Platelets 183  014 - 390 Thousands/uL Final    nRBC 0  /100 WBCs Final    Neutrophils Relative 61  43 - 75 % Final    Immat GRANS % 0  0 - 2 % Final    Lymphocytes Relative 27  14 - 44 % Final    Monocytes Relative 11  4 - 12 % Final    Eosinophils Relative 0  0 - 6 % Final    Basophils Relative 1  0 - 1 % Final    Neutrophils Absolute 4 14  1 85 - 7 62 Thousands/µL Final    Immature Grans Absolute 0 03  0 00 - 0 20 Thousand/uL Final    Lymphocytes Absolute 1 83  0 60 - 4 47 Thousands/µL Final    Monocytes Absolute 0 74  0 17 - 1 22 Thousand/µL Final    Eosinophils Absolute 0 01  0 00 - 0 61 Thousand/µL Final    Basophils Absolute 0 04  0 00 - 0 10 Thousands/µL Final   COMPREHENSIVE METABOLIC PANEL - Abnormal    Sodium 137  136 - 145 mmol/L Final    Potassium 2 8 (*) 3 5 - 5 3 mmol/L Final    Chloride 104  100 - 108 mmol/L Final    CO2 20 (*) 21 - 32 mmol/L Final    ANION GAP 13  4 - 13 mmol/L Final    BUN 5  5 - 25 mg/dL Final    Creatinine 0 55 (*) 0 60 - 1 30 mg/dL Final    Comment: Standardized to IDMS reference method    Glucose 86  65 - 140 mg/dL Final    Comment: If the patient is fasting, the ADA then defines impaired fasting glucose as > 100 mg/dL and diabetes as > or equal to 123 mg/dL    Specimen collection should occur prior to Sulfasalazine administration due to the potential for falsely depressed results  Specimen collection should occur prior to Sulfapyridine administration due to the potential for falsely elevated results  Calcium 8 5  8 3 - 10 1 mg/dL Final    Corrected Calcium 9 3  8 3 - 10 1 mg/dL Final    AST 45  5 - 45 U/L Final    Comment: Specimen collection should occur prior to Sulfasalazine administration due to the potential for falsely depressed results  ALT 20  12 - 78 U/L Final    Comment: Specimen collection should occur prior to Sulfasalazine administration due to the potential for falsely depressed results  Alkaline Phosphatase 99  46 - 116 U/L Final    Total Protein 8 1  6 4 - 8 2 g/dL Final    Albumin 3 0 (*) 3 5 - 5 0 g/dL Final    Total Bilirubin 0 43  0 20 - 1 00 mg/dL Final    Comment: Use of this assay is not recommended for patients undergoing treatment with eltrombopag due to the potential for falsely elevated results      eGFR 103  ml/min/1 73sq m Final    Narrative:     [place] guidelines for Chronic Kidney Disease (CKD):     Stage 1 with normal or high GFR (GFR > 90 mL/min/1 73 square meters)    Stage 2 Mild CKD (GFR = 60-89 mL/min/1 73 square meters)    Stage 3A Moderate CKD (GFR = 45-59 mL/min/1 73 square meters)    Stage 3B Moderate CKD (GFR = 30-44 mL/min/1 73 square meters)    Stage 4 Severe CKD (GFR = 15-29 mL/min/1 73 square meters)    Stage 5 End Stage CKD (GFR <15 mL/min/1 73 square meters)  Note: GFR calculation is accurate only with a steady state creatinine   MEDICAL ALCOHOL - Abnormal    Ethanol Lvl 192 (*) 0 - 3 mg/dL Final   COVID19, INFLUENZA A/B, RSV PCR, [facility] - Normal    SARS-CoV-2 Negative  Negative Final    Comment:      INFLUENZA A PCR Negative  Negative Final    Comment:      INFLUENZA B PCR Negative  Negative Final    Comment:      RSV PCR Negative  Negative Final    Comment:      Narrative:     FOR PEDIATRIC PATIENTS - copy/paste COVID Guidelines URL to browser: [email or web address]    SARS-CoV-2 assay is a Nucleic Acid Amplification assay intended for the  qualitative detection of nucleic acid from SARS-CoV-2 in nasopharyngeal  swabs  Results are for the presumptive identification of SARS-CoV-2 RNA  Positive results are indicative of infection with SARS-CoV-2, the virus  causing COVID-19, but do not rule out bacterial infection or co-infection  with other viruses  Laboratories within the [place] and its  territories are required to report all positive results to the appropriate  public health authorities  Negative results do not preclude SARS-CoV-2  infection and should not be used as the sole basis for treatment or other  patient management decisions  Negative results must be combined with  clinical observations, patient history, and epidemiological information  This test has not been FDA cleared or approved  This test has been authorized by FDA under an Emergency Use Authorization  (EUA)  This test is only authorized for the duration of time the  declaration that circumstances exist justifying the authorization of the  emergency use of an in vitro diagnostic tests for detection of SARS-CoV-2  virus and/or diagnosis of COVID-19 infection under section 564(b)(1) of  the Act, 21 U  S C  547SQM-8(V)(0), unless the authorization is terminated  or revoked sooner  The test has been validated but independent review by FDA  and CLIA is pending  Test performed using [employer] GeneXpert: This RT-PCR assay targets N2,  a region unique to SARS-CoV-2   A conserved region in the E-gene was chosen  for pan-Sarbecovirus detection which includes SARS-CoV-2    TSH, 3RD GENERATION - Normal    TSH 3RD GENERATON 2 005  0 450 - 4 500 uIU/mL Final    Comment: The recommended reference ranges for TSH during pregnancy are as follows:   First trimester 0 1 to 2 5 uIU/mL   Second trimester  0 2 to 3 0 uIU/mL   Third trimester 0 3 to 3 0 uIU/m    Note: Normal ranges may not apply to patients who are transgender, non-binary, or whose legal sex, sex at birth, and gender identity differ  Adult TSH (3rd generation) reference range follows the recommended guidelines of the American Thyroid Association, January, 2020  Narrative:     Patients undergoing fluorescein dye angiography may retain small amounts of fluorescein in the body for 48-72 hours post procedure  Samples containing fluorescein can produce falsely depressed TSH values  If the patient had this procedure,a specimen should be resubmitted post fluorescein clearance  RAPID DRUG SCREEN, URINE - Normal    Amph/Meth UR Negative  Negative Final    Barbiturate Ur Negative  Negative Final    Benzodiazepine Urine Negative  Negative Final    Cocaine Urine Negative  Negative Final    Methadone Urine Negative  Negative Final    Opiate Urine Negative  Negative Final    PCP Ur Negative  Negative Final    THC Urine Negative  Negative Final    Oxycodone Urine Negative  Negative Final    Narrative:     FOR MEDICAL PURPOSES ONLY  IF CONFIRMATION NEEDED PLEASE CONTACT THE LAB WITHIN 5 DAYS      Drug Screen Cutoff Levels:  AMPHETAMINE/METHAMPHETAMINES  1000 ng/mL  BARBITURATES     200 ng/mL  BENZODIAZEPINES     200 ng/mL  COCAINE      300 ng/mL  METHADONE      300 ng/mL  OPIATES      300 ng/mL  PHENCYCLIDINE     25 ng/mL  THC       50 ng/mL  OXYCODONE      100 ng/mL   POTASSIUM - Normal    Potassium 3 9  3 5 - 5 3 mmol/L Final   POCT ALCOHOL BREATH TEST - Normal    EXTBreath Alcohol 0 000   Final       ED Course / Workup Pending (followup):  -Patient reportedly more sober this am with improvement in behavior  -Plan for discharge - working out details of discharge at time of my sign on  -Potassium noted to be low - repleted, repeat level ordered                                     Procedures  MDM  Number of Diagnoses or Management Options  Alcohol intoxication ([facility] [address] )  Diagnosis management comments: Pt with improvement in behavior/demeanor after sobriety here overnight  Denies SI/HI/AH/VH  Stable for discharge - discharged with sister who picked patient up in ED  Amount and/or Complexity of Data Reviewed  Clinical lab tests: reviewed    Patient Progress  Patient progress: stable          Disposition  Final diagnoses:   Alcohol intoxication ([facility] [address] )     Time reflects when diagnosis was documented in both MDM as applicable and the Disposition within this note     Time User Action Codes Description Comment    6/23/2022  7:46 AM Esthela Connolly Add [F10 929] Alcohol intoxication [facility])       ED Disposition     ED Disposition   Discharge    Condition   Stable    Date/Time   Thu Jun 23, 2022  7:46 AM    Comment   Raghav Cat discharge to home/self care                 MD Documentation    Flowsheet Row Most Recent Value   Sending MD Dany      Follow-up Information     Follow up With Specialties Details Why Contact Info Additional Information    [address] [facility] Emergency Medicine  If symptoms worsen [facility] [address]  [address] [facility] Emergency Department, [address] [place] [address], [address]        Discharge Medication List as of 6/23/2022  7:47 AM      CONTINUE these medications which have NOT CHANGED    Details   ascorbic acid (VITAMIN C) 500 MG tablet Take 1 tablet (500 mg total) by mouth daily, Starting Wed 10/23/2019, Print      atorvastatin (LIPITOR) 40 mg tablet Take 40 mg by mouth daily, Starting Thu 4/11/2019, Historical Med      ferrous sulfate 325 (65 Fe) mg tablet Take 1 tablet (325 mg total) by mouth daily with breakfast, Starting Wed 10/23/2019, Print      haloperidol (HALDOL) 10 mg tablet One tablet at bedtime, Historical Med      levothyroxine 25 mcg tablet 50 mcg , Historical Med      pantoprazole (PROTONIX) 40 mg tablet Take 1 tablet (40 mg total) by mouth 2 (two) times a day before meals, Starting Tue 10/22/2019, Print      potassium chloride (K-DUR,KLOR-CON) 20 mEq tablet Take 1 tablet (20 mEq total) by mouth daily for 3 days, Starting Wed 10/23/2019, Until Sat 10/26/2019, Print      senna-docusate sodium (SENOKOT S) 8 6-50 mg per tablet Twice daily PRN, Historical Med      sodium chloride 1 g tablet Take 1 tablet (1 g total) by mouth 3 (three) times a day with meals, Starting Tue 10/22/2019, Print      sucralfate (CARAFATE) 1 g/10 mL suspension Take 10 mL (1,000 mg total) by mouth every 6 (six) hours, Starting Tue 10/22/2019, Print      thiamine 100 MG tablet Take 1 tablet (100 mg total) by mouth daily, Starting Wed 10/23/2019, Print           No discharge procedures on file         ED Provider  Electronically Signed by     Eugenio Santana PA-C  06/23/22 4419

## 2022-06-23 NOTE — ED NOTES
Assumed care of patient at this time  Patient is asleep in room on stretcher with relaxed and non labored respirations  Patient shows no sign of distress  A 1:1 is in place for continual observation ED Craig Camargo  Currently at this time patient is wearing the behavioral health scrubs over her normal clothes  Once awake in he morning patient will be changed completely into behavioral health scrubs       Yaneth Russell RN  06/23/22 6610

## 2022-06-23 NOTE — ED NOTES
Called Patient's sister Darrick  Patient's sister will bring her wallet and cell phone to department so patient can leave       Mary Rosen RN  06/23/22 8188

## 2022-06-23 NOTE — ED NOTES
Pt continues to sleep soundly, making occasional position changes        Rigo Henry RN  06/23/22 1113

## 2022-09-06 ENCOUNTER — HOSPITAL ENCOUNTER (EMERGENCY)
Facility: HOSPITAL | Age: 57
End: 2022-09-07
Attending: EMERGENCY MEDICINE
Payer: MEDICARE

## 2022-09-06 DIAGNOSIS — R45.1 AGITATION: Primary | ICD-10-CM

## 2022-09-06 LAB
ALBUMIN SERPL BCP-MCNC: 3.7 G/DL (ref 3.5–5)
ALP SERPL-CCNC: 116 U/L (ref 43–122)
ALT SERPL W P-5'-P-CCNC: 17 U/L
AMPHETAMINES SERPL QL SCN: NEGATIVE
ANION GAP SERPL CALCULATED.3IONS-SCNC: 13 MMOL/L (ref 5–14)
AST SERPL W P-5'-P-CCNC: 31 U/L (ref 14–36)
ATRIAL RATE: 91 BPM
BARBITURATES UR QL: NEGATIVE
BASOPHILS # BLD AUTO: 0.06 THOUSANDS/ΜL (ref 0–0.1)
BASOPHILS NFR BLD AUTO: 1 % (ref 0–1)
BENZODIAZ UR QL: NEGATIVE
BILIRUB SERPL-MCNC: 0.41 MG/DL (ref 0.2–1)
BILIRUB UR QL STRIP: NEGATIVE
BUN SERPL-MCNC: 7 MG/DL (ref 5–25)
CALCIUM SERPL-MCNC: 9.4 MG/DL (ref 8.4–10.2)
CHLORIDE SERPL-SCNC: 104 MMOL/L (ref 96–108)
CLARITY UR: CLEAR
CO2 SERPL-SCNC: 20 MMOL/L (ref 21–32)
COCAINE UR QL: NEGATIVE
COLOR UR: NORMAL
CREAT SERPL-MCNC: 0.39 MG/DL (ref 0.6–1.2)
EOSINOPHIL # BLD AUTO: 0.11 THOUSAND/ΜL (ref 0–0.61)
EOSINOPHIL NFR BLD AUTO: 1 % (ref 0–6)
ERYTHROCYTE [DISTWIDTH] IN BLOOD BY AUTOMATED COUNT: 15.1 % (ref 11.6–15.1)
ETHANOL SERPL-MCNC: 84 MG/DL (ref 0–10)
GFR SERPL CREATININE-BSD FRML MDRD: 116 ML/MIN/1.73SQ M
GLUCOSE SERPL-MCNC: 104 MG/DL (ref 70–99)
GLUCOSE UR STRIP-MCNC: NEGATIVE MG/DL
HCT VFR BLD AUTO: 34.9 % (ref 34.8–46.1)
HGB BLD-MCNC: 11.1 G/DL (ref 11.5–15.4)
HGB UR QL STRIP.AUTO: NEGATIVE
IMM GRANULOCYTES # BLD AUTO: 0.03 THOUSAND/UL (ref 0–0.2)
IMM GRANULOCYTES NFR BLD AUTO: 0 % (ref 0–2)
KETONES UR STRIP-MCNC: NEGATIVE MG/DL
LEUKOCYTE ESTERASE UR QL STRIP: NEGATIVE
LYMPHOCYTES # BLD AUTO: 1.99 THOUSANDS/ΜL (ref 0.6–4.47)
LYMPHOCYTES NFR BLD AUTO: 23 % (ref 14–44)
MCH RBC QN AUTO: 28 PG (ref 26.8–34.3)
MCHC RBC AUTO-ENTMCNC: 31.8 G/DL (ref 31.4–37.4)
MCV RBC AUTO: 88 FL (ref 82–98)
METHADONE UR QL: NEGATIVE
MONOCYTES # BLD AUTO: 0.92 THOUSAND/ΜL (ref 0.17–1.22)
MONOCYTES NFR BLD AUTO: 10 % (ref 4–12)
NEUTROPHILS # BLD AUTO: 5.75 THOUSANDS/ΜL (ref 1.85–7.62)
NEUTS SEG NFR BLD AUTO: 65 % (ref 43–75)
NITRITE UR QL STRIP: NEGATIVE
NRBC BLD AUTO-RTO: 0 /100 WBCS
OPIATES UR QL SCN: NEGATIVE
OXYCODONE+OXYMORPHONE UR QL SCN: NEGATIVE
P AXIS: 75 DEGREES
PCP UR QL: NEGATIVE
PH UR STRIP.AUTO: 7 [PH]
PLATELET # BLD AUTO: 293 THOUSANDS/UL (ref 149–390)
PMV BLD AUTO: 9.9 FL (ref 8.9–12.7)
POTASSIUM SERPL-SCNC: 3.5 MMOL/L (ref 3.5–5.3)
PR INTERVAL: 132 MS
PROT SERPL-MCNC: 7.5 G/DL (ref 6.4–8.4)
PROT UR STRIP-MCNC: NEGATIVE MG/DL
QRS AXIS: 64 DEGREES
QRSD INTERVAL: 86 MS
QT INTERVAL: 360 MS
QTC INTERVAL: 442 MS
RBC # BLD AUTO: 3.96 MILLION/UL (ref 3.81–5.12)
SODIUM SERPL-SCNC: 137 MMOL/L (ref 135–147)
SP GR UR STRIP.AUTO: 1.01 (ref 1–1.04)
T WAVE AXIS: 61 DEGREES
THC UR QL: NEGATIVE
TSH SERPL DL<=0.05 MIU/L-ACNC: 2.48 UIU/ML (ref 0.45–4.5)
UROBILINOGEN UA: NEGATIVE MG/DL
VENTRICULAR RATE: 91 BPM
WBC # BLD AUTO: 8.86 THOUSAND/UL (ref 4.31–10.16)

## 2022-09-06 PROCEDURE — 84443 ASSAY THYROID STIM HORMONE: CPT | Performed by: EMERGENCY MEDICINE

## 2022-09-06 PROCEDURE — 85025 COMPLETE CBC W/AUTO DIFF WBC: CPT | Performed by: EMERGENCY MEDICINE

## 2022-09-06 PROCEDURE — 96372 THER/PROPH/DIAG INJ SC/IM: CPT

## 2022-09-06 PROCEDURE — 93010 ELECTROCARDIOGRAM REPORT: CPT | Performed by: INTERNAL MEDICINE

## 2022-09-06 PROCEDURE — 90471 IMMUNIZATION ADMIN: CPT

## 2022-09-06 PROCEDURE — 93005 ELECTROCARDIOGRAM TRACING: CPT

## 2022-09-06 PROCEDURE — 99285 EMERGENCY DEPT VISIT HI MDM: CPT

## 2022-09-06 PROCEDURE — 80307 DRUG TEST PRSMV CHEM ANLYZR: CPT | Performed by: EMERGENCY MEDICINE

## 2022-09-06 PROCEDURE — 82077 ASSAY SPEC XCP UR&BREATH IA: CPT | Performed by: EMERGENCY MEDICINE

## 2022-09-06 PROCEDURE — 99285 EMERGENCY DEPT VISIT HI MDM: CPT | Performed by: EMERGENCY MEDICINE

## 2022-09-06 PROCEDURE — 36415 COLL VENOUS BLD VENIPUNCTURE: CPT | Performed by: EMERGENCY MEDICINE

## 2022-09-06 PROCEDURE — 80053 COMPREHEN METABOLIC PANEL: CPT | Performed by: EMERGENCY MEDICINE

## 2022-09-06 PROCEDURE — 90715 TDAP VACCINE 7 YRS/> IM: CPT | Performed by: EMERGENCY MEDICINE

## 2022-09-06 RX ORDER — PANTOPRAZOLE SODIUM 40 MG/1
40 TABLET, DELAYED RELEASE ORAL
Status: DISCONTINUED | OUTPATIENT
Start: 2022-09-06 | End: 2022-09-07 | Stop reason: HOSPADM

## 2022-09-06 RX ORDER — HALOPERIDOL 5 MG/ML
5 INJECTION INTRAMUSCULAR ONCE
Status: COMPLETED | OUTPATIENT
Start: 2022-09-06 | End: 2022-09-06

## 2022-09-06 RX ORDER — LANOLIN ALCOHOL/MO/W.PET/CERES
100 CREAM (GRAM) TOPICAL DAILY
Status: DISCONTINUED | OUTPATIENT
Start: 2022-09-06 | End: 2022-09-07 | Stop reason: HOSPADM

## 2022-09-06 RX ORDER — LORAZEPAM 2 MG/ML
2 INJECTION INTRAMUSCULAR ONCE
Status: COMPLETED | OUTPATIENT
Start: 2022-09-06 | End: 2022-09-06

## 2022-09-06 RX ORDER — ATORVASTATIN CALCIUM 20 MG/1
40 TABLET, FILM COATED ORAL
Status: DISCONTINUED | OUTPATIENT
Start: 2022-09-06 | End: 2022-09-07 | Stop reason: HOSPADM

## 2022-09-06 RX ORDER — LEVOTHYROXINE SODIUM 0.1 MG/1
100 TABLET ORAL
Status: DISCONTINUED | OUTPATIENT
Start: 2022-09-06 | End: 2022-09-07 | Stop reason: HOSPADM

## 2022-09-06 RX ORDER — ASCORBIC ACID 500 MG
500 TABLET ORAL DAILY
Status: DISCONTINUED | OUTPATIENT
Start: 2022-09-06 | End: 2022-09-07 | Stop reason: HOSPADM

## 2022-09-06 RX ORDER — BENZTROPINE MESYLATE 1 MG/ML
2 INJECTION INTRAMUSCULAR; INTRAVENOUS ONCE
Status: COMPLETED | OUTPATIENT
Start: 2022-09-06 | End: 2022-09-06

## 2022-09-06 RX ORDER — FERROUS SULFATE 325(65) MG
325 TABLET ORAL
Status: DISCONTINUED | OUTPATIENT
Start: 2022-09-06 | End: 2022-09-07 | Stop reason: HOSPADM

## 2022-09-06 RX ADMIN — LORAZEPAM 2 MG: 2 INJECTION INTRAMUSCULAR; INTRAVENOUS at 04:55

## 2022-09-06 RX ADMIN — BENZTROPINE MESYLATE 2 MG: 1 INJECTION INTRAMUSCULAR; INTRAVENOUS at 04:55

## 2022-09-06 RX ADMIN — HALOPERIDOL LACTATE 5 MG: 5 INJECTION, SOLUTION INTRAMUSCULAR at 04:55

## 2022-09-06 RX ADMIN — TETANUS TOXOID, REDUCED DIPHTHERIA TOXOID AND ACELLULAR PERTUSSIS VACCINE, ADSORBED 0.5 ML: 5; 2.5; 8; 8; 2.5 SUSPENSION INTRAMUSCULAR at 05:33

## 2022-09-06 NOTE — ED NOTES
PAPROMISe indicates:  Eligible  Recipient #[id number]  Plan is MHX status  Primary payor is Medicare  Patient is not enrolled in a managed plan

## 2022-09-06 NOTE — ED NOTES
Bed search results:    [facility]: Full per Matt Barcenas from Intake  [place]: Full per Sarah Mart  [facility]: No acute beds per Meño Alexandra  [place]: No beds tonight per Cinthia  Try tomorrow  [place]: Full per Jaquelin Dodd  [facility]: Full per Brina Christie  [place]: Full per [place]  Try in AM   [place]: No acute beds per Becky Singh: No dual programming per Saurabh  [place]: No dual per Rajiv Peters  Bed search will need to continue next shift
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
## 2022-09-06 NOTE — RESTRAINT FACE TO FACE
Restraint Face to Face   Meggan Gant 61 y o  female MRN: [id number]  Unit/Bed#: ED 04 Encounter: [id number]      Physical Evaluation  Constitutional: agitated aggressive unsafe behavior  HENT:   Head: Normocephalic and atraumatic  Eyes: EOM are normal   Neck: Normal range of motion  Cardiovascular: Normal rate and regular rhythm  Pulmonary/Chest: Effort normal  No respiratory distress  Abdominal: Pt exhibits no distension  Musculoskeletal: Normal range of motion, moving all extremities  Neurological: Pt is alert, screaming, oriented to person place year   Skin: Skin is warm and dry  Pt is not diaphoretic  Vitals reviewed      Purpose for Restraints/ Seclusion High risk for self harm, High risk for causing significant disruption of treatment environment , High risk for harm to others and high risk for flight  Patient's reaction to the intervention agitation  Patient's medical condition: stable  Patient's Behavioral condition: agitation  Restraints to be Continued

## 2022-09-06 NOTE — LETTER
[facility] EMERGENCY DEPARTMENT  [address] [facility] [address]  [phone]  Dept: [phone]      EMTALA TRANSFER CONSENT    NAME Miguel A Cheek 1965                              MRN [id number]    I have been informed of my rights regarding examination, treatment, and transfer   by Dr Naomia Olszewski, MD    Benefits: Specialized equipment and/or services available at the receiving facility (Include comment)________________________    Risks: Potential for delay in receiving treatment      { ED EMTALA TRANSFER CHOICES:[id number]}    I authorize the performance of emergency medical procedures and treatments upon me in both transit and upon arrival at the receiving facility  Additionally, I authorize the release of any and all medical records to the receiving facility and request they be transported with me, if possible  I understand that the safest mode of transportation during a medical emergency is an ambulance and that the Hospital advocates the use of this mode of transport  Risks of traveling to the receiving facility by car, including absence of medical control, life sustaining equipment, such as oxygen, and medical personnel has been explained to me and I fully understand them  (EUNICE CORRECT BOX BELOW)  [  ]  I consent to the stated transfer and to be transported by ambulance/helicopter  [  ]  I consent to the stated transfer, but refuse transportation by ambulance and accept full responsibility for my transportation by car    I understand the risks of non-ambulance transfers and I exonerate the Hospital and its staff from any deterioration in my condition that results from this refusal     X___________________________________________    DATE  09/07/22  TIME________  Signature of patient or legally responsible individual signing on patient behalf           RELATIONSHIP TO PATIENT_________________________          Provider Certification    NAME Lázaro Bucio 1965                              MRN [id number]    A medical screening exam was performed on the above named patient  Based on the examination:    Condition Necessitating Transfer The encounter diagnosis was Agitation  Patient Condition: The patient has been stabilized such that within reasonable medical probability, no material deterioration of the patient condition or the condition of the unborn child(bryce) is likely to result from the transfer    Reason for Transfer: Level of Care needed not available at this facility    Transfer Requirements: [facility], [address]   · Space available and qualified personnel available for treatment as acknowledged by Johanny Perdomo  · Agreed to accept transfer and to provide appropriate medical treatment as acknowledged by       Dr Maryuri Bui MD  · Appropriate medical records of the examination and treatment of the patient are provided at the time of transfer   [address] [facility], [address] _______  · Transfer will be performed by qualified personnel from [facility] [phone]  and appropriate transfer equipment as required, including the use of necessary and appropriate life support measures      Provider Certification: I have examined the patient and explained the following risks and benefits of being transferred/refusing transfer to the patient/family:  General risk, such as traffic hazards, adverse weather conditions, rough terrain or turbulence, possible failure of equipment (including vehicle or aircraft), or consequences of actions of persons outside the control of the transport personnel      Based on these reasonable risks and benefits to the patient and/or the unborn child(bryce), and based upon the information available at the time of the patients examination, I certify that the medical benefits reasonably to be expected from the provision of appropriate medical treatments at another medical facility outweigh the increasing risks, if any, to the individuals medical condition, and in the case of labor to the unborn child, from effecting the transfer      X____________________________________________ DATE 09/07/22        TIME_______      ORIGINAL - SEND TO MEDICAL RECORDS   COPY - SEND WITH PATIENT DURING TRANSFER
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
## 2022-09-06 NOTE — ED CARE HANDOFF
Emergency Department Sign Out Note        Sign out and transfer of care from Dr Joao Ochoa  See Separate Emergency Department note  The patient, Deacon Singh, was evaluated by the previous provider for Psych  Workup Completed:  Medical clearance completed    ED Course / Workup Pending (followup): Awaiting placement  Continue safety plan  ED Course as of 09/06/22 1537   Tue Sep 06, 2022   1453 302  Medically cleared  No events during day  Was in restraints this morning, now out of restraints  On arrival, significantly agitated  Procedures  MDM        Disposition  Final diagnoses:   Agitation     Time reflects when diagnosis was documented in both MDM as applicable and the Disposition within this note     Time User Action Codes Description Comment    9/6/2022  7:11 AM Thaddeus Davis Add [R45 1] Agitation       ED Disposition     ED Disposition   Transfer to [address] [place] [address]   --    Date/Time   Tue Sep 6, 2022  7:11 AM    Comment   Deacon Singh should be transferred out to D and has been medically cleared  Follow-up Information    None       Patient's Medications   Discharge Prescriptions    No medications on file     No discharge procedures on file         ED Provider  Electronically Signed by     Debbie Elena DO  09/06/22 4445
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
## 2022-09-07 VITALS
SYSTOLIC BLOOD PRESSURE: 130 MMHG | TEMPERATURE: 98.8 F | HEART RATE: 96 BPM | RESPIRATION RATE: 20 BRPM | OXYGEN SATURATION: 96 % | BODY MASS INDEX: 21.06 KG/M2 | WEIGHT: 134.48 LBS | DIASTOLIC BLOOD PRESSURE: 79 MMHG

## 2022-09-07 RX ORDER — LORAZEPAM 1 MG/1
2 TABLET ORAL ONCE
Status: DISCONTINUED | OUTPATIENT
Start: 2022-09-07 | End: 2022-09-07 | Stop reason: HOSPADM

## 2022-09-07 RX ORDER — HALOPERIDOL 5 MG/ML
5 INJECTION INTRAMUSCULAR EVERY 6 HOURS PRN
Status: DISCONTINUED | OUTPATIENT
Start: 2022-09-07 | End: 2022-09-07 | Stop reason: HOSPADM

## 2022-09-07 RX ORDER — HALOPERIDOL 5 MG/1
5 TABLET ORAL ONCE
Status: DISCONTINUED | OUTPATIENT
Start: 2022-09-07 | End: 2022-09-07 | Stop reason: HOSPADM

## 2022-09-07 RX ORDER — LORAZEPAM 2 MG/ML
2 INJECTION INTRAMUSCULAR EVERY 6 HOURS PRN
Status: DISCONTINUED | OUTPATIENT
Start: 2022-09-07 | End: 2022-09-07 | Stop reason: HOSPADM

## 2022-09-07 NOTE — ED NOTES
Pt started to get agitated speaking to this RN and throwing TV remote at the wall   Provider at bedside     Faby Gore RN  09/07/22 5306

## 2022-09-07 NOTE — ED NOTES
Patient is accepted at [facility]  Patient is accepted by Ben[facility] Longo /juan [address] is arranged with [facility] p/u @ 8am  Transportation is scheduled for 9/7/2022  Patient may arrive after 0800        Nurse report is to be called to ** prior to patient transfer

## 2022-09-07 NOTE — ED NOTES
Update to bed search:    Fdolncl-Vvleh-L and F low acuity mood disorder, M dual, M general psych-no aggression     Olesya Lora-no answer     Roby-no answer     Xfpjgrbhu-Qetbl-6 acult F     Friends-Madisyn-M and F adolescents     Grisel-Jill-roshan Parker-Amalia1 -M adult and 1 F adult- both low acuity, M and F adolescents     Ommzekcrl-Oshoq-T low acuity and may have adolescent beds in am

## 2022-09-07 NOTE — EMTALA/ACUTE CARE TRANSFER
[facility] EMERGENCY DEPARTMENT  [address] [facility] [id number] [phone]  Dept: [phone]      EMTALA TRANSFER CONSENT    NAME Randy Cobos 1965                              MRN [id number]    I have been informed of my rights regarding examination, treatment, and transfer   by Dr Hernandez Alcala MD    Benefits: Specialized equipment and/or services available at the receiving facility (Include comment)________________________    Risks: Potential for delay in receiving treatment      Transfer Request   I acknowledge that my medical condition has been evaluated and explained to me by the emergency department physician or other qualified medical person and/or my attending physician who has recommended and offered to me further medical examination and treatment  I understand the Hospital's obligation with respect to the treatment and stabilization of my emergency medical condition  I nevertheless request to be transferred  I release the Hospital, the doctor, and any other persons caring for me from all responsibility or liability for any injury or ill effects that may result from my transfer and agree to accept all responsibility for the consequences of my choice to transfer, rather than receive stabilizing treatment at the Hospital  I understand that because the transfer is my request, my insurance may not provide reimbursement for the services  The Hospital will assist and direct me and my family in how to make arrangements for transfer, but the hospital is not liable for any fees charged by the transport service  In spite of this understanding, I refuse to consent to further medical examination and treatment which has been offered to me, and request transfer to  Rafael Hernandez Name, [address] : [address] [facility], [address]   I authorize the performance of emergency medical procedures and treatments upon me in both transit and upon arrival at the receiving facility  Additionally, I authorize the release of any and all medical records to the receiving facility and request they be transported with me, if possible  I authorize the performance of emergency medical procedures and treatments upon me in both transit and upon arrival at the receiving facility  Additionally, I authorize the release of any and all medical records to the receiving facility and request they be transported with me, if possible  I understand that the safest mode of transportation during a medical emergency is an ambulance and that the Hospital advocates the use of this mode of transport  Risks of traveling to the receiving facility by car, including absence of medical control, life sustaining equipment, such as oxygen, and medical personnel has been explained to me and I fully understand them  (EUNICE CORRECT BOX BELOW)  [  ]  I consent to the stated transfer and to be transported by ambulance/helicopter  [  ]  I consent to the stated transfer, but refuse transportation by ambulance and accept full responsibility for my transportation by car  I understand the risks of non-ambulance transfers and I exonerate the Hospital and its staff from any deterioration in my condition that results from this refusal     X___________________________________________    DATE  22  TIME________  Signature of patient or legally responsible individual signing on patient behalf           RELATIONSHIP TO PATIENT_________________________          Provider Certification    NAME Loni Rose                                        [facility] 1965                              MRN [id number]    A medical screening exam was performed on the above named patient  Based on the examination:    Condition Necessitating Transfer The encounter diagnosis was Agitation      Patient Condition: The patient has been stabilized such that within reasonable medical probability, no material deterioration of the patient condition or the condition of the unborn child(bryce) is likely to result from the transfer    Reason for Transfer: Level of Care needed not available at this facility    Transfer Requirements: [address] [facility], [address]   · Space available and qualified personnel available for treatment as acknowledged by Liat Lorenzana  · Agreed to accept transfer and to provide appropriate medical treatment as acknowledged by       Dr Sissy Kawasaki, MD  · Appropriate medical records of the examination and treatment of the patient are provided at the time of transfer   [address], [address] _______  · Transfer will be performed by qualified personnel from [facility] [phone]  and appropriate transfer equipment as required, including the use of necessary and appropriate life support measures  Provider Certification: I have examined the patient and explained the following risks and benefits of being transferred/refusing transfer to the patient/family:  General risk, such as traffic hazards, adverse weather conditions, rough terrain or turbulence, possible failure of equipment (including vehicle or aircraft), or consequences of actions of persons outside the control of the transport personnel      Based on these reasonable risks and benefits to the patient and/or the unborn child(bryce), and based upon the information available at the time of the patients examination, I certify that the medical benefits reasonably to be expected from the provision of appropriate medical treatments at another medical facility outweigh the increasing risks, if any, to the individuals medical condition, and in the case of labor to the unborn child, from effecting the transfer      X____________________________________________ DATE 09/07/22        TIME_______      ORIGINAL - SEND TO MEDICAL RECORDS   COPY - SEND WITH PATIENT DURING TRANSFER

## 2022-09-07 NOTE — ED NOTES
Pt awake and asking for phone to call sister or mother to update them regarding pt transferring to [facility] @ 8am    Phone not given to pt at this time r/t to pt previously throwing remote        Wing KIRSTEN Ferguson  09/07/22 9799

## 2022-09-07 NOTE — ED NOTES
Assumed care for pt  Pt currently resting with no s/s of distress observed  Continues on 1:1 for safety        Bartolo Perdomo RN  09/07/22 3988

## 2023-09-12 ENCOUNTER — HOSPITAL ENCOUNTER (EMERGENCY)
Facility: HOSPITAL | Age: 58
Discharge: HOME | End: 2023-09-12
Attending: EMERGENCY MEDICINE | Admitting: EMERGENCY MEDICINE
Payer: MEDICARE

## 2023-09-12 VITALS
WEIGHT: 132.28 LBS | TEMPERATURE: 97.8 F | DIASTOLIC BLOOD PRESSURE: 64 MMHG | BODY MASS INDEX: 20.72 KG/M2 | HEART RATE: 70 BPM | SYSTOLIC BLOOD PRESSURE: 93 MMHG | RESPIRATION RATE: 16 BRPM | OXYGEN SATURATION: 99 %

## 2023-09-12 DIAGNOSIS — F10.10 ALCOHOL ABUSE: Primary | ICD-10-CM

## 2023-09-12 PROCEDURE — 99284 EMERGENCY DEPT VISIT MOD MDM: CPT

## 2023-09-12 PROCEDURE — 99284 EMERGENCY DEPT VISIT MOD MDM: CPT | Performed by: EMERGENCY MEDICINE

## 2023-09-12 RX ORDER — LORAZEPAM 2 MG/ML
INJECTION INTRAMUSCULAR
Status: COMPLETED
Start: 2023-09-12 | End: 2023-09-12

## 2023-09-12 RX ADMIN — LORAZEPAM 2 MG: 2 INJECTION INTRAMUSCULAR; INTRAVENOUS at 16:15
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
## 2023-09-26 ENCOUNTER — APPOINTMENT (EMERGENCY)
Dept: RADIOLOGY | Facility: HOSPITAL | Age: 58
End: 2023-09-26
Payer: MEDICARE

## 2023-09-26 ENCOUNTER — HOSPITAL ENCOUNTER (EMERGENCY)
Facility: HOSPITAL | Age: 58
Discharge: HOME | End: 2023-09-27
Attending: EMERGENCY MEDICINE
Payer: MEDICARE

## 2023-09-26 VITALS
OXYGEN SATURATION: 96 % | DIASTOLIC BLOOD PRESSURE: 66 MMHG | SYSTOLIC BLOOD PRESSURE: 137 MMHG | HEART RATE: 98 BPM | RESPIRATION RATE: 20 BRPM | TEMPERATURE: 97.7 F

## 2023-09-26 DIAGNOSIS — M87.9 OSTEONECROSIS OF RIGHT HIP (HCC): ICD-10-CM

## 2023-09-26 DIAGNOSIS — M87.052 AVASCULAR NECROSIS OF LEFT FEMORAL HEAD (HCC): Primary | ICD-10-CM

## 2023-09-26 PROCEDURE — 99284 EMERGENCY DEPT VISIT MOD MDM: CPT

## 2023-09-26 PROCEDURE — 73502 X-RAY EXAM HIP UNI 2-3 VIEWS: CPT

## 2023-09-26 PROCEDURE — 99284 EMERGENCY DEPT VISIT MOD MDM: CPT | Performed by: EMERGENCY MEDICINE

## 2023-09-26 RX ORDER — ACETAMINOPHEN 325 MG/1
650 TABLET ORAL ONCE
Status: COMPLETED | OUTPATIENT
Start: 2023-09-26 | End: 2023-09-26

## 2023-09-26 RX ADMIN — ACETAMINOPHEN 650 MG: 325 TABLET, FILM COATED ORAL at 23:30
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
## 2023-09-27 NOTE — DISCHARGE INSTRUCTIONS
You do not have any broken bones of your right hip. You do have chronic changes of both hips that require follow-up with an orthopedic doctor. I have given you referral for this doctor. They will call you in a few days to schedule an appointment. You should return to the emergency room if of severe pain that cannot be controlled by medication, if you cannot walk, or if you start to develop fevers.

## 2023-11-01 ENCOUNTER — ANESTHESIA EVENT (INPATIENT)
Dept: GASTROENTEROLOGY | Facility: HOSPITAL | Age: 58
DRG: 369 | End: 2023-11-01
Payer: MEDICARE

## 2023-11-01 ENCOUNTER — HOSPITAL ENCOUNTER (INPATIENT)
Facility: HOSPITAL | Age: 58
LOS: 3 days | Discharge: HOME | DRG: 369 | End: 2023-11-04
Attending: EMERGENCY MEDICINE | Admitting: INTERNAL MEDICINE
Payer: MEDICARE

## 2023-11-01 ENCOUNTER — APPOINTMENT (INPATIENT)
Dept: GASTROENTEROLOGY | Facility: HOSPITAL | Age: 58
DRG: 369 | End: 2023-11-01
Payer: MEDICARE

## 2023-11-01 ENCOUNTER — ANESTHESIA (INPATIENT)
Dept: GASTROENTEROLOGY | Facility: HOSPITAL | Age: 58
DRG: 369 | End: 2023-11-01
Payer: MEDICARE

## 2023-11-01 DIAGNOSIS — F10.920 ALCOHOLIC INTOXICATION WITHOUT COMPLICATION (HCC): Primary | ICD-10-CM

## 2023-11-01 DIAGNOSIS — K92.2 GI BLEED: ICD-10-CM

## 2023-11-01 DIAGNOSIS — G93.40 ENCEPHALOPATHY: ICD-10-CM

## 2023-11-01 DIAGNOSIS — K70.30 ALCOHOLIC CIRRHOSIS OF LIVER WITHOUT ASCITES (HCC): ICD-10-CM

## 2023-11-01 DIAGNOSIS — F10.10 ALCOHOL ABUSE: ICD-10-CM

## 2023-11-01 DIAGNOSIS — K92.0 HEMATEMESIS: ICD-10-CM

## 2023-11-01 DIAGNOSIS — F10.929 ALCOHOL INTOXICATION (HCC): ICD-10-CM

## 2023-11-01 DIAGNOSIS — I95.9 HYPOTENSION: ICD-10-CM

## 2023-11-01 DIAGNOSIS — D64.9 ACUTE ON CHRONIC ANEMIA: ICD-10-CM

## 2023-11-01 DIAGNOSIS — D62 ACUTE BLOOD LOSS ANEMIA: ICD-10-CM

## 2023-11-01 LAB
ABO GROUP BLD: NORMAL
ALBUMIN SERPL BCP-MCNC: 3.9 G/DL (ref 3.5–5)
ALP SERPL-CCNC: 82 U/L (ref 34–104)
ALT SERPL W P-5'-P-CCNC: 24 U/L (ref 7–52)
ANION GAP SERPL CALCULATED.3IONS-SCNC: 20 MMOL/L
APTT PPP: 25 SECONDS (ref 23–37)
AST SERPL W P-5'-P-CCNC: 32 U/L (ref 13–39)
BASOPHILS # BLD AUTO: 0.09 THOUSANDS/ÂΜL (ref 0–0.1)
BASOPHILS NFR BLD AUTO: 1 % (ref 0–1)
BILIRUB SERPL-MCNC: 0.5 MG/DL (ref 0.2–1)
BLD GP AB SCN SERPL QL: NEGATIVE
BUN SERPL-MCNC: 36 MG/DL (ref 5–25)
CALCIUM SERPL-MCNC: 8.7 MG/DL (ref 8.4–10.2)
CHLORIDE SERPL-SCNC: 103 MMOL/L (ref 96–108)
CO2 SERPL-SCNC: 14 MMOL/L (ref 21–32)
CREAT SERPL-MCNC: 0.52 MG/DL (ref 0.6–1.3)
EOSINOPHIL # BLD AUTO: 0.06 THOUSAND/ÂΜL (ref 0–0.61)
EOSINOPHIL NFR BLD AUTO: 1 % (ref 0–6)
ERYTHROCYTE [DISTWIDTH] IN BLOOD BY AUTOMATED COUNT: 22.7 % (ref 11.6–15.1)
ETHANOL SERPL-MCNC: 79 MG/DL
GFR SERPL CREATININE-BSD FRML MDRD: 104 ML/MIN/1.73SQ M
GLUCOSE SERPL-MCNC: 92 MG/DL (ref 65–140)
HCT VFR BLD AUTO: 17.3 % (ref 34.8–46.1)
HCT VFR BLD AUTO: 20.1 % (ref 34.8–46.1)
HGB BLD-MCNC: 4.9 G/DL (ref 11.5–15.4)
HGB BLD-MCNC: 5.7 G/DL (ref 11.5–15.4)
HGB BLD-MCNC: 7.2 G/DL (ref 11.5–15.4)
HGB BLD-MCNC: 7.3 G/DL (ref 11.5–15.4)
IMM GRANULOCYTES # BLD AUTO: 0.18 THOUSAND/UL (ref 0–0.2)
IMM GRANULOCYTES NFR BLD AUTO: 2 % (ref 0–2)
INR PPP: 1.14 (ref 0.84–1.19)
LYMPHOCYTES # BLD AUTO: 1.63 THOUSANDS/ÂΜL (ref 0.6–4.47)
LYMPHOCYTES NFR BLD AUTO: 13 % (ref 14–44)
MCH RBC QN AUTO: 31.3 PG (ref 26.8–34.3)
MCHC RBC AUTO-ENTMCNC: 27.9 G/DL (ref 31.4–37.4)
MCV RBC AUTO: 112 FL (ref 82–98)
MONOCYTES # BLD AUTO: 1.16 THOUSAND/ÂΜL (ref 0.17–1.22)
MONOCYTES NFR BLD AUTO: 10 % (ref 4–12)
NEUTROPHILS # BLD AUTO: 9.08 THOUSANDS/ÂΜL (ref 1.85–7.62)
NEUTS SEG NFR BLD AUTO: 73 % (ref 43–75)
NRBC BLD AUTO-RTO: 0 /100 WBCS
PLATELET # BLD AUTO: 306 THOUSANDS/UL (ref 149–390)
PMV BLD AUTO: 11.1 FL (ref 8.9–12.7)
POTASSIUM SERPL-SCNC: 3.4 MMOL/L (ref 3.5–5.3)
PROT SERPL-MCNC: 7.1 G/DL (ref 6.4–8.4)
PROTHROMBIN TIME: 14.8 SECONDS (ref 11.6–14.5)
RBC # BLD AUTO: 1.79 MILLION/UL (ref 3.81–5.12)
RH BLD: POSITIVE
SODIUM SERPL-SCNC: 137 MMOL/L (ref 135–147)
SPECIMEN EXPIRATION DATE: NORMAL
WBC # BLD AUTO: 12.2 THOUSAND/UL (ref 4.31–10.16)

## 2023-11-01 PROCEDURE — 36430 TRANSFUSION BLD/BLD COMPNT: CPT

## 2023-11-01 PROCEDURE — 82077 ASSAY SPEC XCP UR&BREATH IA: CPT | Performed by: EMERGENCY MEDICINE

## 2023-11-01 PROCEDURE — 80053 COMPREHEN METABOLIC PANEL: CPT | Performed by: EMERGENCY MEDICINE

## 2023-11-01 PROCEDURE — 30233N1 TRANSFUSION OF NONAUTOLOGOUS RED BLOOD CELLS INTO PERIPHERAL VEIN, PERCUTANEOUS APPROACH: ICD-10-PCS | Performed by: EMERGENCY MEDICINE

## 2023-11-01 PROCEDURE — 96368 THER/DIAG CONCURRENT INF: CPT

## 2023-11-01 PROCEDURE — 99284 EMERGENCY DEPT VISIT MOD MDM: CPT

## 2023-11-01 PROCEDURE — C9113 INJ PANTOPRAZOLE SODIUM, VIA: HCPCS | Performed by: EMERGENCY MEDICINE

## 2023-11-01 PROCEDURE — P9016 RBC LEUKOCYTES REDUCED: HCPCS

## 2023-11-01 PROCEDURE — 96375 TX/PRO/DX INJ NEW DRUG ADDON: CPT

## 2023-11-01 PROCEDURE — 86920 COMPATIBILITY TEST SPIN: CPT

## 2023-11-01 PROCEDURE — 06L38CZ OCCLUSION OF ESOPHAGEAL VEIN WITH EXTRALUMINAL DEVICE, VIA NATURAL OR ARTIFICIAL OPENING ENDOSCOPIC: ICD-10-PCS | Performed by: INTERNAL MEDICINE

## 2023-11-01 PROCEDURE — 85018 HEMOGLOBIN: CPT | Performed by: NURSE PRACTITIONER

## 2023-11-01 PROCEDURE — NC001 PR NO CHARGE: Performed by: INTERNAL MEDICINE

## 2023-11-01 PROCEDURE — 85730 THROMBOPLASTIN TIME PARTIAL: CPT | Performed by: EMERGENCY MEDICINE

## 2023-11-01 PROCEDURE — 85014 HEMATOCRIT: CPT | Performed by: EMERGENCY MEDICINE

## 2023-11-01 PROCEDURE — 96366 THER/PROPH/DIAG IV INF ADDON: CPT

## 2023-11-01 PROCEDURE — 85018 HEMOGLOBIN: CPT | Performed by: EMERGENCY MEDICINE

## 2023-11-01 PROCEDURE — 86850 RBC ANTIBODY SCREEN: CPT | Performed by: EMERGENCY MEDICINE

## 2023-11-01 PROCEDURE — 85610 PROTHROMBIN TIME: CPT | Performed by: EMERGENCY MEDICINE

## 2023-11-01 PROCEDURE — 85025 COMPLETE CBC W/AUTO DIFF WBC: CPT | Performed by: EMERGENCY MEDICINE

## 2023-11-01 PROCEDURE — 99291 CRITICAL CARE FIRST HOUR: CPT | Performed by: INTERNAL MEDICINE

## 2023-11-01 PROCEDURE — 86901 BLOOD TYPING SEROLOGIC RH(D): CPT | Performed by: EMERGENCY MEDICINE

## 2023-11-01 PROCEDURE — 96365 THER/PROPH/DIAG IV INF INIT: CPT

## 2023-11-01 PROCEDURE — 36415 COLL VENOUS BLD VENIPUNCTURE: CPT | Performed by: EMERGENCY MEDICINE

## 2023-11-01 PROCEDURE — C9113 INJ PANTOPRAZOLE SODIUM, VIA: HCPCS | Performed by: INTERNAL MEDICINE

## 2023-11-01 PROCEDURE — 86900 BLOOD TYPING SEROLOGIC ABO: CPT | Performed by: EMERGENCY MEDICINE

## 2023-11-01 RX ORDER — CHLORHEXIDINE GLUCONATE ORAL RINSE 1.2 MG/ML
15 SOLUTION DENTAL EVERY 12 HOURS SCHEDULED
Status: DISCONTINUED | OUTPATIENT
Start: 2023-11-01 | End: 2023-11-02

## 2023-11-01 RX ORDER — FOLIC ACID 1 MG/1
1 TABLET ORAL DAILY
Status: DISCONTINUED | OUTPATIENT
Start: 2023-11-02 | End: 2023-11-04 | Stop reason: HOSPADM

## 2023-11-01 RX ORDER — PROPOFOL 10 MG/ML
INJECTION, EMULSION INTRAVENOUS CONTINUOUS PRN
Status: DISCONTINUED | OUTPATIENT
Start: 2023-11-01 | End: 2023-11-01

## 2023-11-01 RX ORDER — ONDANSETRON 2 MG/ML
4 INJECTION INTRAMUSCULAR; INTRAVENOUS EVERY 8 HOURS PRN
Status: DISCONTINUED | OUTPATIENT
Start: 2023-11-01 | End: 2023-11-04 | Stop reason: HOSPADM

## 2023-11-01 RX ORDER — OCTREOTIDE ACETATE 100 UG/ML
50 INJECTION, SOLUTION INTRAVENOUS; SUBCUTANEOUS ONCE
Status: COMPLETED | OUTPATIENT
Start: 2023-11-01 | End: 2023-11-01

## 2023-11-01 RX ORDER — PANTOPRAZOLE SODIUM 40 MG/10ML
40 INJECTION, POWDER, LYOPHILIZED, FOR SOLUTION INTRAVENOUS EVERY 12 HOURS SCHEDULED
Status: DISCONTINUED | OUTPATIENT
Start: 2023-11-01 | End: 2023-11-03

## 2023-11-01 RX ORDER — LANOLIN ALCOHOL/MO/W.PET/CERES
100 CREAM (GRAM) TOPICAL DAILY
Status: DISCONTINUED | OUTPATIENT
Start: 2023-11-02 | End: 2023-11-04 | Stop reason: HOSPADM

## 2023-11-01 RX ORDER — PROPOFOL 10 MG/ML
INJECTION, EMULSION INTRAVENOUS AS NEEDED
Status: DISCONTINUED | OUTPATIENT
Start: 2023-11-01 | End: 2023-11-01

## 2023-11-01 RX ORDER — ONDANSETRON 2 MG/ML
INJECTION INTRAMUSCULAR; INTRAVENOUS
Status: COMPLETED
Start: 2023-11-01 | End: 2023-11-01

## 2023-11-01 RX ORDER — SUCCINYLCHOLINE/SOD CL,ISO/PF 100 MG/5ML
SYRINGE (ML) INTRAVENOUS AS NEEDED
Status: DISCONTINUED | OUTPATIENT
Start: 2023-11-01 | End: 2023-11-01

## 2023-11-01 RX ORDER — SODIUM CHLORIDE 9 MG/ML
125 INJECTION, SOLUTION INTRAVENOUS CONTINUOUS
Status: CANCELLED | OUTPATIENT
Start: 2023-11-01

## 2023-11-01 RX ORDER — POTASSIUM CHLORIDE 14.9 MG/ML
20 INJECTION INTRAVENOUS ONCE
Status: COMPLETED | OUTPATIENT
Start: 2023-11-01 | End: 2023-11-01

## 2023-11-01 RX ORDER — CEFTRIAXONE 1 G/50ML
1000 INJECTION, SOLUTION INTRAVENOUS EVERY 24 HOURS
Status: DISCONTINUED | OUTPATIENT
Start: 2023-11-02 | End: 2023-11-04 | Stop reason: HOSPADM

## 2023-11-01 RX ORDER — METOCLOPRAMIDE HYDROCHLORIDE 5 MG/ML
10 INJECTION INTRAMUSCULAR; INTRAVENOUS ONCE
Status: COMPLETED | OUTPATIENT
Start: 2023-11-01 | End: 2023-11-01

## 2023-11-01 RX ORDER — LIDOCAINE HYDROCHLORIDE 20 MG/ML
INJECTION, SOLUTION EPIDURAL; INFILTRATION; INTRACAUDAL; PERINEURAL AS NEEDED
Status: DISCONTINUED | OUTPATIENT
Start: 2023-11-01 | End: 2023-11-01

## 2023-11-01 RX ORDER — CEFTRIAXONE 1 G/50ML
1000 INJECTION, SOLUTION INTRAVENOUS ONCE
Status: COMPLETED | OUTPATIENT
Start: 2023-11-01 | End: 2023-11-01

## 2023-11-01 RX ORDER — OXAZEPAM 15 MG/1
15 CAPSULE ORAL EVERY 8 HOURS SCHEDULED
Status: DISCONTINUED | OUTPATIENT
Start: 2023-11-01 | End: 2023-11-02

## 2023-11-01 RX ORDER — ONDANSETRON 4 MG/1
4 TABLET, ORALLY DISINTEGRATING ORAL ONCE
Status: COMPLETED | OUTPATIENT
Start: 2023-11-01 | End: 2023-11-01

## 2023-11-01 RX ORDER — HALOPERIDOL 5 MG/1
10 TABLET ORAL
Status: DISCONTINUED | OUTPATIENT
Start: 2023-11-01 | End: 2023-11-04 | Stop reason: HOSPADM

## 2023-11-01 RX ORDER — MIDAZOLAM HYDROCHLORIDE 2 MG/2ML
INJECTION, SOLUTION INTRAMUSCULAR; INTRAVENOUS AS NEEDED
Status: DISCONTINUED | OUTPATIENT
Start: 2023-11-01 | End: 2023-11-01

## 2023-11-01 RX ORDER — ONDANSETRON 4 MG/1
4 TABLET, ORALLY DISINTEGRATING ORAL EVERY 6 HOURS PRN
Status: DISCONTINUED | OUTPATIENT
Start: 2023-11-01 | End: 2023-11-01

## 2023-11-01 RX ORDER — SODIUM CHLORIDE, SODIUM LACTATE, POTASSIUM CHLORIDE, CALCIUM CHLORIDE 600; 310; 30; 20 MG/100ML; MG/100ML; MG/100ML; MG/100ML
125 INJECTION, SOLUTION INTRAVENOUS CONTINUOUS
Status: DISCONTINUED | OUTPATIENT
Start: 2023-11-01 | End: 2023-11-01

## 2023-11-01 RX ADMIN — MIDAZOLAM 2 MG: 1 INJECTION INTRAMUSCULAR; INTRAVENOUS at 14:38

## 2023-11-01 RX ADMIN — PANTOPRAZOLE SODIUM 40 MG: 40 INJECTION, POWDER, FOR SOLUTION INTRAVENOUS at 20:17

## 2023-11-01 RX ADMIN — OXAZEPAM 15 MG: 15 CAPSULE ORAL at 21:39

## 2023-11-01 RX ADMIN — SODIUM CHLORIDE 80 MG: 9 INJECTION, SOLUTION INTRAVENOUS at 06:44

## 2023-11-01 RX ADMIN — LIDOCAINE HYDROCHLORIDE 100 MG: 20 INJECTION, SOLUTION EPIDURAL; INFILTRATION; INTRACAUDAL at 14:42

## 2023-11-01 RX ADMIN — SODIUM CHLORIDE, SODIUM LACTATE, POTASSIUM CHLORIDE, AND CALCIUM CHLORIDE 2000 ML: .6; .31; .03; .02 INJECTION, SOLUTION INTRAVENOUS at 05:47

## 2023-11-01 RX ADMIN — POTASSIUM CHLORIDE 20 MEQ: 14.9 INJECTION, SOLUTION INTRAVENOUS at 16:01

## 2023-11-01 RX ADMIN — Medication 100 MG: at 14:42

## 2023-11-01 RX ADMIN — SODIUM CHLORIDE, SODIUM LACTATE, POTASSIUM CHLORIDE, AND CALCIUM CHLORIDE 125 ML/HR: .6; .31; .03; .02 INJECTION, SOLUTION INTRAVENOUS at 10:46

## 2023-11-01 RX ADMIN — HALOPERIDOL 10 MG: 5 TABLET ORAL at 21:39

## 2023-11-01 RX ADMIN — ONDANSETRON 4 MG: 2 INJECTION INTRAMUSCULAR; INTRAVENOUS at 13:18

## 2023-11-01 RX ADMIN — METOCLOPRAMIDE 10 MG: 5 INJECTION, SOLUTION INTRAMUSCULAR; INTRAVENOUS at 06:42

## 2023-11-01 RX ADMIN — OCTREOTIDE ACETATE 50 MCG/HR: 500 INJECTION, SOLUTION INTRAVENOUS; SUBCUTANEOUS at 20:49

## 2023-11-01 RX ADMIN — ONDANSETRON 4 MG: 4 TABLET, ORALLY DISINTEGRATING ORAL at 05:26

## 2023-11-01 RX ADMIN — CHLORHEXIDINE GLUCONATE 15 ML: 1.2 RINSE ORAL at 20:17

## 2023-11-01 RX ADMIN — PROPOFOL 70 MG: 10 INJECTION, EMULSION INTRAVENOUS at 14:54

## 2023-11-01 RX ADMIN — PROPOFOL 50 MG: 10 INJECTION, EMULSION INTRAVENOUS at 14:50

## 2023-11-01 RX ADMIN — OCTREOTIDE ACETATE 50 MCG: 100 INJECTION, SOLUTION INTRAVENOUS; SUBCUTANEOUS at 08:39

## 2023-11-01 RX ADMIN — PROPOFOL 140 MCG/KG/MIN: 10 INJECTION, EMULSION INTRAVENOUS at 14:44

## 2023-11-01 RX ADMIN — PROPOFOL 150 MG: 10 INJECTION, EMULSION INTRAVENOUS at 14:42

## 2023-11-01 RX ADMIN — SODIUM CHLORIDE 8 MG/HR: 9 INJECTION, SOLUTION INTRAVENOUS at 09:25

## 2023-11-01 RX ADMIN — OCTREOTIDE ACETATE 50 MCG/HR: 500 INJECTION, SOLUTION INTRAVENOUS; SUBCUTANEOUS at 09:09

## 2023-11-01 RX ADMIN — CEFTRIAXONE 1000 MG: 1 INJECTION, SOLUTION INTRAVENOUS at 08:28
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
## 2023-11-01 NOTE — PLAN OF CARE
Problem: Potential for Falls  Goal: Patient will remain free of falls  Description: INTERVENTIONS:  - Educate patient/family on patient safety including physical limitations  - Instruct patient to call for assistance with activity   - Consult OT/PT to assist with strengthening/mobility   - Keep Call bell within reach  - Keep bed low and locked with side rails adjusted as appropriate  - Keep care items and personal belongings within reach  - Initiate and maintain comfort rounds  - Make Fall Risk Sign visible to staff  - Offer Toileting every 2 Hours, in advance of need  - Initiate/Maintain bed alarm  - Obtain necessary fall risk management equipment:   - Apply yellow socks and bracelet for high fall risk patients  - Consider moving patient to room near nurses station  Outcome: Progressing     Problem: PAIN - ADULT  Goal: Verbalizes/displays adequate comfort level or baseline comfort level  Description: Interventions:  - Encourage patient to monitor pain and request assistance  - Assess pain using appropriate pain scale  - Administer analgesics based on type and severity of pain and evaluate response  - Implement non-pharmacological measures as appropriate and evaluate response  - Consider cultural and social influences on pain and pain management  - Notify physician/advanced practitioner if interventions unsuccessful or patient reports new pain  Outcome: Progressing     Problem: INFECTION - ADULT  Goal: Absence or prevention of progression during hospitalization  Description: INTERVENTIONS:  - Assess and monitor for signs and symptoms of infection  - Monitor lab/diagnostic results  - Monitor all insertion sites, i.e. indwelling lines, tubes, and drains  - Monitor endotracheal if appropriate and nasal secretions for changes in amount and color  - [place] appropriate cooling/warming therapies per order  - Administer medications as ordered  - Instruct and encourage patient and family to use good hand hygiene technique  - Identify and instruct in appropriate isolation precautions for identified infection/condition  Outcome: Progressing  Goal: Absence of fever/infection during neutropenic period  Description: INTERVENTIONS:  - Monitor WBC    Outcome: Progressing     Problem: SAFETY ADULT  Goal: Patient will remain free of falls  Description: INTERVENTIONS:  - Educate patient/family on patient safety including physical limitations  - Instruct patient to call for assistance with activity   - Consult OT/PT to assist with strengthening/mobility   - Keep Call bell within reach  - Keep bed low and locked with side rails adjusted as appropriate  - Keep care items and personal belongings within reach  - Initiate and maintain comfort rounds  - Make Fall Risk Sign visible to staff  - Offer Toileting every 2 Hours, in advance of need  - Initiate/Maintain bed alarm  - Obtain necessary fall risk management equipment:   - Apply yellow socks and bracelet for high fall risk patients  - Consider moving patient to room near nurses station  Outcome: Progressing  Goal: Maintain or return to baseline ADL function  Description: INTERVENTIONS:  -  Assess patient's ability to carry out ADLs; assess patient's baseline for ADL function and identify physical deficits which impact ability to perform ADLs (bathing, care of mouth/teeth, toileting, grooming, dressing, etc.)  - Assess/evaluate cause of self-care deficits   - Assess range of motion  - Assess patient's mobility; develop plan if impaired  - Assess patient's need for assistive devices and provide as appropriate  - Encourage maximum independence but intervene and supervise when necessary  - Involve family in performance of ADLs  - Assess for home care needs following discharge   - Consider OT consult to assist with ADL evaluation and planning for discharge  - Provide patient education as appropriate  Outcome: Progressing  Goal: Maintains/Returns to pre admission functional level  Description: INTERVENTIONS:  - Perform BMAT or MOVE assessment daily.   - Set and communicate daily mobility goal to care team and patient/family/caregiver. - Collaborate with rehabilitation services on mobility goals if consulted  - Perform Range of Motion 4 times a day. - Reposition patient every 2 hours. - Dangle patient 3 times a day  - Stand patient 3 times a day  - Ambulate patient 3 times a day  - Out of bed to chair 3 times a day   - Out of bed for meals 3 times a day  - Out of bed for toileting  - Record patient progress and toleration of activity level   Outcome: Progressing     Problem: DISCHARGE PLANNING  Goal: Discharge to home or other facility with appropriate resources  Description: INTERVENTIONS:  - Identify barriers to discharge w/patient and caregiver  - Arrange for needed discharge resources and transportation as appropriate  - Identify discharge learning needs (meds, wound care, etc.)  - Arrange for interpretive services to assist at discharge as needed  - Refer to Case Management Department for coordinating discharge planning if the patient needs post-hospital services based on physician/advanced practitioner order or complex needs related to functional status, cognitive ability, or social support system  Outcome: Progressing     Problem: Knowledge Deficit  Goal: Patient/family/caregiver demonstrates understanding of disease process, treatment plan, medications, and discharge instructions  Description: Complete learning assessment and assess knowledge base. Interventions:  - Provide teaching at level of understanding  - Provide teaching via preferred learning methods  Outcome: Progressing     Problem: Nutrition/Hydration-ADULT  Goal: Nutrient/Hydration intake appropriate for improving, restoring or maintaining nutritional needs  Description: Monitor and assess patient's nutrition/hydration status for malnutrition.  Collaborate with interdisciplinary team and initiate plan and interventions as ordered. Monitor patient's weight and dietary intake as ordered or per policy. Utilize nutrition screening tool and intervene as necessary. Determine patient's food preferences and provide high-protein, high-caloric foods as appropriate.      INTERVENTIONS:  - Monitor oral intake, urinary output, labs, and treatment plans  - Assess nutrition and hydration status and recommend course of action  - Evaluate amount of meals eaten  - Assist patient with eating if necessary   - Allow adequate time for meals  - Recommend/ encourage appropriate diets, oral nutritional supplements, and vitamin/mineral supplements  - Order, calculate, and assess calorie counts as needed  - Recommend, monitor, and adjust tube feedings and TPN/PPN based on assessed needs  - Assess need for intravenous fluids  - Provide specific nutrition/hydration education as appropriate  - Include patient/family/caregiver in decisions related to nutrition  Outcome: Progressing     Problem: Prexisting or High Potential for Compromised Skin Integrity  Goal: Skin integrity is maintained or improved  Description: INTERVENTIONS:  - Identify patients at risk for skin breakdown  - Assess and monitor skin integrity  - Assess and monitor nutrition and hydration status  - Monitor labs   - Assess for incontinence   - Turn and reposition patient  - Assist with mobility/ambulation  - Relieve pressure over bony prominences  - Avoid friction and shearing  - Provide appropriate hygiene as needed including keeping skin clean and dry  - Evaluate need for skin moisturizer/barrier cream  - Collaborate with interdisciplinary team   - Patient/family teaching  - Consider wound care consult   Outcome: Progressing

## 2023-11-01 NOTE — ED NOTES
Attempting second IV access at this time with ultrasound guidance.        Meggan Wilson  71/71/22 [id number]

## 2023-11-01 NOTE — ASSESSMENT & PLAN NOTE
60year old female with past medical history of ETOH abuse, cirrhosis, and bipolar disorder presents to the ER after calling EMS secondary to vomiting and diarrhea, acutely intoxicated. Patient found to be tachycardic, hypotensive with initial Hgb of 4.9. Patient recently discharged from behavioral health and reports drinking half a case of beer to "celebrate" her release. EGD from 2/21 showed no varices but esophagitis treated with protonix/carafate outpatient.   Treated with Protonix, Octreotide and 2 U PRBC      Transfuse 3rd unit of PRBC   Protonix drip   Octreotide drip   NPO    GI consulted and following    EGD this afternoon   Recheck Hgb/Hct after transfusion complete

## 2023-11-01 NOTE — ASSESSMENT & PLAN NOTE
Patient with a long history ETOH, last drink 10/31 but prior to that day had not had a drink in over a month as she was staying in a group home      Thiamine IV  Folate IV   Monitor patient for signs and symptoms of withdrawl  CIWA per protocol if begins to show signs of withdrawal

## 2023-11-01 NOTE — ANESTHESIA POSTPROCEDURE EVALUATION
Post-Op Assessment Note    CV Status:  Stable    Pain management: adequate     Mental Status:  Alert and awake   Hydration Status:  Euvolemic   PONV Controlled:  Controlled   Airway Patency:  Patent  Airway: intubated      Post Op Vitals Reviewed: Yes      Staff: Anesthesiologist, CRNA         No notable events documented.     BP      Temp      Pulse     Resp      SpO2      /63   Pulse 105   Temp 98.4 °F (36.9 °C) (Oral)   Resp 20   Ht 5' 7" (1.702 m)   Wt 76.7 kg (169 lb 1.5 oz)   SpO2 93%   BMI 26.48 kg/m²

## 2023-11-01 NOTE — CASE MANAGEMENT
Case Management Discharge Planning Note    Patient name Anne Sam  Location ICU 07/ICU 07 MRN [id number]  : 1965 Date 2023       Current Admission Date: 2023  Current Admission Diagnosis:Hematemesis   Patient Active Problem List    Diagnosis Date Noted    Hematemesis 2023    Acute blood loss anemia 2023    Hyponatremia     Alcoholic cirrhosis of liver without ascites ([address] [facility]) 10/22/2019    Hypokalemia 10/21/2019    GI bleed 10/20/2019    Symptomatic anemia 10/20/2019    Bipolar disorder ([address] [facility]) 05/10/2016    Alcohol intoxication ([address] [facility]) 05/10/2016    Alcohol abuse 05/10/2016    Suicidal ideations 05/10/2016    Homicidal ideation 05/10/2016    Tobacco abuse 05/10/2016      LOS (days): 0  Geometric Mean LOS (GMLOS) (days):   Days to GMLOS:     OBJECTIVE:  Risk of Unplanned Readmission Score: 14.96         Current admission status: Inpatient   Preferred Pharmacy:   [address] [address]  Phone: [phone] Fax: [phone]    [id number] [place] [address]  Phone: [phone] Fax: [phone]    Primary Care Provider: No primary care provider on file. Primary Insurance: MEDICARE  Secondary Insurance:     DISCHARGE DETAILS:                                                                                                 Additional Comments: Pt admitted to ICU. Per chart review, pt homeless residing in hotels. Pt was picked up at [facility]. Pt was admitted to [facility] on a 302 in Sept for SI/HI. Pt has bipolar disorder and schizoaffective disorder. Pt also with history of alcohol abuse and admitted to drinking today. Pt on SSD. IPTA w/ use of walker. CM dept to continue to follow.

## 2023-11-01 NOTE — PROGRESS NOTES
233 [facility]  Progress Note  Name: Rene Solis  MRN: [id number]  Unit/Bed#: ICU 07 I Date of Admission: 11/1/2023   Date of Service: 11/1/2023 I Hospital Day: 0    Assessment/Plan   Acute blood loss anemia  Assessment & Plan  Hbg on admission 4.9 with repeat 5.7. Received 2U PRBc with 3rd being infused at this time. Serial Hgb/Hct Q6 hours   Transfuse for Hgb less than 7    Alcoholic cirrhosis of liver without ascites ([address])  Assessment & Plan  Patient with long standing history of ETOH abuse and cirrhosis. LFTs normal on this admission, MELD 7.  CT AP on 9/23 showed a nodular liver without any masses bile duct and gallbladder. No evidence of ascites or encephalopathy at this time. Patient reports paracentesis during hospitalization - unable to find notes related to this. Patient reports diarrhea, none since hospital arrival, remains afebrile at this time, low suspicion for SBT     Monitor LFT's    Avoid hepatotoxins     GI bleed  Assessment & Plan  60year old female with past medical history of ETOH abuse, cirrhosis, and bipolar disorder presents to the ER after calling EMS secondary to vomiting and diarrhea, acutely intoxicated. Patient found to be tachycardic, hypotensive with initial Hgb of 4.9. Patient recently discharged from behavioral health and reports drinking half a case of beer to "celebrate" her release. EGD from 2/21 showed no varices but esophagitis treated with protonix/carafate outpatient.   Treated with Protonix, Octreotide and 2 U PRBC      Transfuse 3rd unit of PRBC   Protonix drip   Octreotide drip   NPO    GI consulted and following    EGD this afternoon   Recheck Hgb/Hct after transfusion complete       Alcohol abuse  Assessment & Plan  Patient with a long history ETOH, last drink 10/31 but prior to that day had not had a drink in over a month as she was staying in a group home      Thiamine IV  Folate IV   Monitor patient for signs and symptoms of withdrawl  CIWA per protocol if begins to show signs of withdrawal              ICU Core Measures     A: Assess, Prevent, and Manage Pain Has pain been assessed? Yes  Need for changes to pain regimen? No   B: Both SAT/SAT  N/A   C: Choice of Sedation RASS Goal: 0 Alert and Calm  Need for changes to sedation or analgesia regimen? No   D: Delirium CAM-ICU: Negative   E: Early Mobility  Plan for early mobility? Yes   F: Family Engagement Plan for family engagement today? No         Prophylaxis:  VTE Contraindicated secondary to: GI Bleed   Stress Ulcer  covered by pantoprazole (PROTONIX) 80 mg in sodium chloride 0.9 % 100 mL infusion [[id number]]       Subjective   HPI/24hr events:     Chief Complaint:   diarrhea and vomiting     History of Present Illness: a Patient is a 60year old female with past medical history of alcohol abuse, and cirrhosis who present to ED via EMS after reports of vomiting and diarrhea after drinking 1/2 case of beer. Patient found to be tachycardic and hypotensive with Hgb of 4.9. Patient vomited a small amount of bloody emesis in ED. Treated initially with Protonix bolus, octreotide bolus, ceftriaxone and 2 units of PBC with repeat Hgb of 5.7. Transferred to ICU for further management with plans for EGD later today. She does report some abdominal pain. She denied dizziness. Review of Systems   Constitutional:  Negative for appetite change and fatigue. Respiratory:  Negative for cough, chest tightness and shortness of breath. Cardiovascular:  Negative for chest pain and leg swelling. Gastrointestinal:  Positive for abdominal pain, blood in stool, diarrhea, nausea and vomiting. Negative for abdominal distention. Genitourinary:  Positive for difficulty urinating. Musculoskeletal:  Positive for arthralgias. Neurological:  Negative for dizziness, weakness and headaches.            Objective                            Vitals I/O      Most Recent Min/Max in 24hrs   Temp 98.5 °F (36.9 °C) Temp  Min: 97.6 °F (36.4 °C)  Max: 98.9 °F (37.2 °C)   Pulse (!) 110 Pulse  Min: 107  Max: 116   Resp 22 Resp  Min: 16  Max: 41   /63 BP  Min: 72/42  Max: 120/66   O2 Sat 94 % SpO2  Min: 94 %  Max: 100 %      Intake/Output Summary (Last 24 hours) at 11/1/2023 1224  Last data filed at 11/1/2023 4665  Gross per 24 hour   Intake 2500 ml   Output --   Net 2500 ml         Diet NPO     Invasive Monitoring Physical exam    Physical Exam  Skin:     General: Skin is warm and dry. HENT:      Mouth/Throat:      Mouth: Mucous membranes are dry. Cardiovascular:      Rate and Rhythm: Regular rhythm. Tachycardia present. Pulses: Normal pulses. Heart sounds: Normal heart sounds. Musculoskeletal:         General: Normal range of motion. Right lower leg: Trace Edema present. Left lower leg: Trace Edema present. Abdominal:      General: Bowel sounds are normal.      Palpations: Abdomen is soft. Tenderness: There is abdominal tenderness. Pulmonary:      Effort: Pulmonary effort is normal.      Breath sounds: Normal breath sounds. Neurological:      General: No focal deficit present. Mental Status: She is alert and oriented to person, place and time. Motor: Strength full and intact in all extremities. Vitals reviewed.            Diagnostic Studies      EKG:  Imaging:      Medications:  Scheduled PRN   chlorhexidine, 15 mL, Q12H NELLY          Continuous    lactated ringers, 125 mL/hr, Last Rate: 125 mL/hr (11/01/23 1046)  octreotide, 50 mcg/hr, Last Rate: 50 mcg/hr (11/01/23 0909)  pantoprazole (PROTONIX) 80 mg in sodium chloride 0.9 % 100 mL infusion, 8 mg/hr, Last Rate: 8 mg/hr (11/01/23 0925)         Labs:    CBC    Recent Labs     11/01/23  0550 11/01/23  0634   WBC 12.20*  --    HGB 5.7* 4.9*   HCT 20.1* 17.3*     --      BMP    Recent Labs     11/01/23  0550   SODIUM 137   K 3.4*      CO2 14*   AGAP 20   BUN 36*   CREATININE 0.52*   CALCIUM 8.7 Coags    Recent Labs     11/01/23  0550   INR 1.14   PTT 25        Additional Electrolytes  No recent results       Blood Gas    No recent results  No recent results LFTs  Recent Labs     11/01/23  0550   ALT 24   AST 32   ALKPHOS 82   ALB 3.9   TBILI 0.50       Infectious  No recent results  Glucose  Recent Labs     11/01/23  0550   GLUC [address] [facility], [place]

## 2023-11-01 NOTE — PROGRESS NOTES
[address] [facility]  Progress Note  Name: Gordo Bundy  MRN: [id number]  Unit/Bed#: ICU 07 I Date of Admission: 11/1/2023   Date of Service: 11/1/2023 I Hospital Day: 0    Assessment/Plan   Acute blood loss anemia  Assessment & Plan  Hbg on admission 4.9 with repeat 5.7. Received 2U PRBc with 3rd being infused at this time. Serial Hgb/Hct Q6 hours   Transfuse for Hgb less than 7    Alcoholic cirrhosis of liver without ascites ([address])  Assessment & Plan  Patient with long standing history of ETOH abuse and cirrhosis. LFTs normal on this admission, MELD 7.  CT AP on 9/23 showed a nodular liver without any masses bile duct and gallbladder. No evidence of ascites or encephalopathy at this time. Patient reports paracentesis during hospitalization - unable to find notes related to this. Patient reports diarrhea, none since hospital arrival, remains afebrile at this time, low suspicion for SBT     Monitor LFT's    Avoid hepatotoxins     GI bleed  Assessment & Plan  60year old female with past medical history of ETOH abuse, cirrhosis, and bipolar disorder presents to the ER after calling EMS secondary to vomiting and diarrhea, acutely intoxicated. Patient found to be tachycardic, hypotensive with initial Hgb of 4.9. Patient recently discharged from behavioral health and reports drinking half a case of beer to "celebrate" her release. EGD from 2/21 showed no varices but esophagitis treated with protonix/carafate outpatient.   Treated with Protonix, Octreotide and 2 U PRBC      Transfuse 3rd unit of PRBC   Protonix drip   Octreotide drip   NPO    GI consulted and following    EGD this afternoon   Recheck Hgb/Hct after transfusion complete       Alcohol abuse  Assessment & Plan  Patient with a long history ETOH, last drink 10/31 but prior to that day had not had a drink in over a month as she was staying in a group home      Thiamine 100 mg PO Daily   Folate 1 mg PO Daily   Monitor patient for signs and symptoms of withdrawl  CIWA per protocol               ICU Core Measures     A: Assess, Prevent, and Manage Pain Has pain been assessed? Yes  Need for changes to pain regimen? No   B: Both SAT/SAT  N/A   C: Choice of Sedation RASS Goal: 0 Alert and Calm  Need for changes to sedation or analgesia regimen? No   D: Delirium CAM-ICU: Positive   E: Early Mobility  Plan for early mobility? No   F: Family Engagement Plan for family engagement today? Yes         Prophylaxis:  VTE Contraindicated secondary to: GI Bleed   Stress Ulcer  covered bypantoprazole (PROTONIX) 80 mg in sodium chloride 0.9 % 100 mL infusion [[id number]]     { I DISAPPEARING TIP I Update Problem List [facility]:48558}  Subjective   HPI/24hr events:Review of Systems   Constitutional: Negative. Negative for chills, fatigue, fever and unexpected weight change. HENT:  Negative for hearing loss. Eyes: Negative. Respiratory:  Negative for cough, chest tightness and shortness of breath. Cardiovascular:  Negative for chest pain. Genitourinary:  Negative for difficulty urinating. Skin:  Negative for color change. Neurological:  Negative for dizziness, weakness and headaches. Objective                            Vitals I/O      Most Recent Min/Max in 24hrs   Temp 98.5 °F (36.9 °C) Temp  Min: 97.6 °F (36.4 °C)  Max: 98.9 °F (37.2 °C)   Pulse (!) 110 Pulse  Min: 107  Max: 116   Resp 22 Resp  Min: 16  Max: 41   /63 BP  Min: 72/42  Max: 120/66   O2 Sat 94 % SpO2  Min: 94 %  Max: 100 %      Intake/Output Summary (Last 24 hours) at 11/1/2023 1206  Last data filed at 11/1/2023 1663  Gross per 24 hour   Intake 2500 ml   Output --   Net 2500 ml         Diet NPO     Invasive Monitoring Physical exam    Physical Exam  Skin:     General: Skin is warm and dry. HENT:      Mouth/Throat:      Mouth: Mucous membranes are dry. Neck:      Vascular: No JVD. Cardiovascular:      Rate and Rhythm: Regular rhythm. Tachycardia present. Musculoskeletal:         General: Normal range of motion. Abdominal:      General: There is no distension. Palpations: Abdomen is soft. Tenderness: There is abdominal tenderness. There is guarding. Constitutional:       Appearance: She is well-developed and well-nourished. Pulmonary:      Effort: Pulmonary effort is normal.   Neurological:      General: No focal deficit present. Mental Status: She is alert and oriented to person, place and time. Mental status is at baseline. Motor: Strength full and intact in all extremities. Vitals reviewed. Diagnostic Studies    {IDisappearing Data Review I RadiologyI Cardiology:86973}  EKG:   Imaging: I have personally reviewed pertinent reports.        Medications:  Scheduled PRN   chlorhexidine, 15 mL, Q12H [address]          Continuous    lactated ringers, 125 mL/hr, Last Rate: 125 mL/hr (11/01/23 1046)  octreotide, 50 mcg/hr, Last Rate: 50 mcg/hr (11/01/23 0909)  pantoprazole (PROTONIX) 80 mg in sodium chloride 0.9 % 100 mL infusion, 8 mg/hr, Last Rate: 8 mg/hr (11/01/23 0925)         Labs:  { I Disappearing Data Review I Results Review I Micro :71789}  CBC    Recent Labs     11/01/23  0550 11/01/23  0634   WBC 12.20*  --    HGB 5.7* 4.9*   HCT 20.1* 17.3*     --      BMP    Recent Labs     11/01/23  0550   SODIUM 137   K 3.4*      CO2 14*   AGAP 20   BUN 36*   CREATININE 0.52*   CALCIUM 8.7       Coags    Recent Labs     11/01/23  0550   INR 1.14   PTT 25        Additional Electrolytes  No recent results       Blood Gas    No recent results  No recent results LFTs  Recent Labs     11/01/23  0550   ALT 24   AST 32   ALKPHOS 82   ALB 3.9   TBILI 0.50       Infectious  No recent results  Glucose  Recent Labs     11/01/23  0550   GLUC 92               Critical Care Time Delivered : Upon my evaluation, this patient had a high probability of imminent or life-threatening deterioration due to acute blood loss anemia, which required my direct attention, intervention, and personal management. I have personally provided *** minutes of critical care time, exclusive of procedures, teaching, family meetings, and any prior time recorded by providers other than myself.      [place] Burgos
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
## 2023-11-01 NOTE — PROCEDURES
US guided Peripheral IV    Date/Time: 11/1/2023 9:00 AM    Performed by: Abran Longo MD  Authorized by: Abran Longo MD    Patient location:  ED  Performed by: Attending  Indications:     Indications: difficulty obtaining IV access    Procedure details:     Standard clean technique used for ultrasound access: Yes      Location:  Right arm    Catheter size:  18 gauge    Number of attempts:  1    Successful placement: yes    Post-procedure details:     Post-procedure:  Dressing applied    Assessment: free fluid flow and no signs of infiltration      Post-procedure complications: none      Patient tolerance of procedure:   Tolerated well, no immediate complications

## 2023-11-01 NOTE — ED CARE HANDOFF
Emergency Department Sign Out Note        Sign out and transfer of care from Dr. Antonio Bailey. See Separate Emergency Department note. The patient, Contreras Williamson, was evaluated by the previous provider for N/V/D. Workup Completed:  CBC, electrolytes    ED Course / Workup Pending (followup):  Repeat Hgb    0715 - Repeat hemoglobin 4.9, patient remains hypotensive into the 09'L systolic, on evaluation she is noted to have bloody emesis. Ordered uncrossed blood transfusion, did discuss need for emergent blood with blood bank. Ordered ceftriaxone, octreotide IV and gtt, protonix gtt as IV had already been ordered. Did write to GI over tiger text to explain concern for variceal bleed. Procedures  Medical Decision Making  Amount and/or Complexity of Data Reviewed  Labs: ordered. Risk  Prescription drug management. Decision regarding hospitalization. Disposition  Final diagnoses:   Alcoholic intoxication without complication ([address])   Encephalopathy   Acute on chronic anemia     Time reflects when diagnosis was documented in both MDM as applicable and the Disposition within this note       Time User Action Codes Description Comment    11/1/2023  6:35 AM Jacinda BURNETTE Add [C88.938] Alcoholic intoxication without complication ([address])     54/7/2175  6:35 AM Jacinda BURNETTE Add [G93.40] Encephalopathy     11/1/2023  6:35 AM Lucy Gaurdado Add [D64.9] Acute on chronic anemia           ED Disposition       ED Disposition   Admit    Condition   Stable    Date/Time   Wed Nov 1, 2023  6:35 AM    Comment                  Follow-up Information    None       Patient's Medications   Discharge Prescriptions    No medications on file     No discharge procedures on file.        ED Provider  Electronically Signed by     Sawyer Stratton MD  11/01/23 1022

## 2023-11-01 NOTE — ANESTHESIA PREPROCEDURE EVALUATION
Procedure:  EGD    Relevant Problems   GI/HEPATIC   (+) Alcoholic cirrhosis of liver without ascites (HCC)   (+) GI bleed   (+) Hematemesis      HEMATOLOGY   (+) Acute blood loss anemia   (+) Symptomatic anemia        Physical Exam    Airway    Mallampati score: II    Neck ROM: full     Dental       Cardiovascular  Rhythm: regular, Cardiovascular exam normal    Pulmonary  Pulmonary exam normal Breath sounds clear to auscultation    Other Findings    Anesthesia Plan  ASA Score- 3 Emergent    Anesthesia Type- general with ASA Monitors. Additional Monitors:     Airway Plan: ETT. Comment: Hgb = 7.2 (from < 5 gms) after PRBCs. .       Plan Factors-        Patient summary reviewed. Patient is a current smoker. Patient not instructed to abstain from smoking on day of procedure. Patient smoked on day of surgery. There is medical exclusion for perioperative obstructive sleep apnea risk education. Induction- intravenous and rapid sequence induction. Postoperative Plan-     Informed Consent- Anesthetic plan and risks discussed with patient. I personally reviewed this patient with the CRNA. Discussed and agreed on the Anesthesia Plan with the CRNA. Dyan Hernandez

## 2023-11-01 NOTE — ASSESSMENT & PLAN NOTE
Patient with long standing history of ETOH abuse and cirrhosis. LFTs normal on this admission, MELD 7.  CT AP on 9/23 showed a nodular liver without any masses bile duct and gallbladder. No evidence of ascites or encephalopathy at this time. Patient reports paracentesis during hospitalization - unable to find notes related to this.   Patient reports diarrhea, none since hospital arrival, remains afebrile at this time, low suspicion for SBT     Monitor LFT's    Avoid hepatotoxins
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
## 2023-11-01 NOTE — ED NOTES
Called ICU to give report, RN unavailable, awaiting return call from [place].      Hai Liang RN  11/01/23 3604

## 2023-11-01 NOTE — ASSESSMENT & PLAN NOTE
Hbg on admission 4.9 with repeat 5.7. Received 2U PRBc with 3rd being infused at this time.       Serial Hgb/Hct Q6 hours   Transfuse for Hgb less than 7

## 2023-11-01 NOTE — ED NOTES
Per Dr. Miquel Cade via TT, pt to have blood infused "as fast as possible." Rate changed to 999ml/hr.      Elton Kirk RN  11/01/23 3225

## 2023-11-02 ENCOUNTER — APPOINTMENT (INPATIENT)
Dept: ULTRASOUND IMAGING | Facility: HOSPITAL | Age: 58
DRG: 369 | End: 2023-11-02
Payer: MEDICARE

## 2023-11-02 LAB
ABO GROUP BLD BPU: NORMAL
ALBUMIN SERPL BCP-MCNC: 3.1 G/DL (ref 3.5–5)
ALP SERPL-CCNC: 68 U/L (ref 34–104)
ALT SERPL W P-5'-P-CCNC: 23 U/L (ref 7–52)
ANION GAP SERPL CALCULATED.3IONS-SCNC: 4 MMOL/L
AST SERPL W P-5'-P-CCNC: 32 U/L (ref 13–39)
BASOPHILS # BLD AUTO: 0.03 THOUSANDS/ÂΜL (ref 0–0.1)
BASOPHILS NFR BLD AUTO: 1 % (ref 0–1)
BILIRUB SERPL-MCNC: 0.82 MG/DL (ref 0.2–1)
BPU ID: NORMAL
BUN SERPL-MCNC: 18 MG/DL (ref 5–25)
CA-I BLD-SCNC: 1.1 MMOL/L (ref 1.12–1.32)
CALCIUM ALBUM COR SERPL-MCNC: 8.7 MG/DL (ref 8.3–10.1)
CALCIUM SERPL-MCNC: 8 MG/DL (ref 8.4–10.2)
CHLORIDE SERPL-SCNC: 108 MMOL/L (ref 96–108)
CO2 SERPL-SCNC: 25 MMOL/L (ref 21–32)
CREAT SERPL-MCNC: 0.47 MG/DL (ref 0.6–1.3)
CROSSMATCH: NORMAL
EOSINOPHIL # BLD AUTO: 0.1 THOUSAND/ÂΜL (ref 0–0.61)
EOSINOPHIL NFR BLD AUTO: 2 % (ref 0–6)
ERYTHROCYTE [DISTWIDTH] IN BLOOD BY AUTOMATED COUNT: 19.8 % (ref 11.6–15.1)
GFR SERPL CREATININE-BSD FRML MDRD: 108 ML/MIN/1.73SQ M
GLUCOSE SERPL-MCNC: 126 MG/DL (ref 65–140)
HCT VFR BLD AUTO: 24.2 % (ref 34.8–46.1)
HGB BLD-MCNC: 7.6 G/DL (ref 11.5–15.4)
HGB BLD-MCNC: 8.9 G/DL (ref 11.5–15.4)
HGB BLD-MCNC: 9.1 G/DL (ref 11.5–15.4)
IMM GRANULOCYTES # BLD AUTO: 0.02 THOUSAND/UL (ref 0–0.2)
IMM GRANULOCYTES NFR BLD AUTO: 0 % (ref 0–2)
LYMPHOCYTES # BLD AUTO: 1.31 THOUSANDS/ÂΜL (ref 0.6–4.47)
LYMPHOCYTES NFR BLD AUTO: 23 % (ref 14–44)
MAGNESIUM SERPL-MCNC: 1.8 MG/DL (ref 1.9–2.7)
MCH RBC QN AUTO: 30.5 PG (ref 26.8–34.3)
MCHC RBC AUTO-ENTMCNC: 31.4 G/DL (ref 31.4–37.4)
MCV RBC AUTO: 97 FL (ref 82–98)
MONOCYTES # BLD AUTO: 0.84 THOUSAND/ÂΜL (ref 0.17–1.22)
MONOCYTES NFR BLD AUTO: 15 % (ref 4–12)
NEUTROPHILS # BLD AUTO: 3.45 THOUSANDS/ÂΜL (ref 1.85–7.62)
NEUTS SEG NFR BLD AUTO: 59 % (ref 43–75)
NRBC BLD AUTO-RTO: 0 /100 WBCS
PLATELET # BLD AUTO: 165 THOUSANDS/UL (ref 149–390)
PMV BLD AUTO: 10.2 FL (ref 8.9–12.7)
POTASSIUM SERPL-SCNC: 3.6 MMOL/L (ref 3.5–5.3)
PROT SERPL-MCNC: 5.7 G/DL (ref 6.4–8.4)
RBC # BLD AUTO: 2.49 MILLION/UL (ref 3.81–5.12)
SODIUM SERPL-SCNC: 137 MMOL/L (ref 135–147)
UNIT DISPENSE STATUS: NORMAL
UNIT PRODUCT CODE: NORMAL
UNIT PRODUCT VOLUME: 350 ML
UNIT RH: NORMAL
WBC # BLD AUTO: 5.75 THOUSAND/UL (ref 4.31–10.16)

## 2023-11-02 PROCEDURE — 93976 VASCULAR STUDY: CPT

## 2023-11-02 PROCEDURE — 85025 COMPLETE CBC W/AUTO DIFF WBC: CPT | Performed by: NURSE PRACTITIONER

## 2023-11-02 PROCEDURE — C9113 INJ PANTOPRAZOLE SODIUM, VIA: HCPCS | Performed by: INTERNAL MEDICINE

## 2023-11-02 PROCEDURE — 80053 COMPREHEN METABOLIC PANEL: CPT | Performed by: NURSE PRACTITIONER

## 2023-11-02 PROCEDURE — 82330 ASSAY OF CALCIUM: CPT | Performed by: NURSE PRACTITIONER

## 2023-11-02 PROCEDURE — 99232 SBSQ HOSP IP/OBS MODERATE 35: CPT | Performed by: INTERNAL MEDICINE

## 2023-11-02 PROCEDURE — NC001 PR NO CHARGE: Performed by: INTERNAL MEDICINE

## 2023-11-02 PROCEDURE — 85018 HEMOGLOBIN: CPT

## 2023-11-02 PROCEDURE — 83735 ASSAY OF MAGNESIUM: CPT | Performed by: NURSE PRACTITIONER

## 2023-11-02 RX ORDER — ATORVASTATIN CALCIUM 40 MG/1
40 TABLET, FILM COATED ORAL
Status: DISCONTINUED | OUTPATIENT
Start: 2023-11-02 | End: 2023-11-04 | Stop reason: HOSPADM

## 2023-11-02 RX ORDER — ASCORBIC ACID 500 MG
500 TABLET ORAL DAILY
Status: DISCONTINUED | OUTPATIENT
Start: 2023-11-03 | End: 2023-11-04 | Stop reason: HOSPADM

## 2023-11-02 RX ORDER — MAGNESIUM SULFATE HEPTAHYDRATE 40 MG/ML
2 INJECTION, SOLUTION INTRAVENOUS ONCE
Status: COMPLETED | OUTPATIENT
Start: 2023-11-02 | End: 2023-11-02

## 2023-11-02 RX ORDER — FERROUS SULFATE 325(65) MG
325 TABLET ORAL
Status: DISCONTINUED | OUTPATIENT
Start: 2023-11-03 | End: 2023-11-04

## 2023-11-02 RX ORDER — MAGNESIUM SULFATE HEPTAHYDRATE 40 MG/ML
2 INJECTION, SOLUTION INTRAVENOUS ONCE
Status: CANCELLED | OUTPATIENT
Start: 2023-11-02 | End: 2023-11-02

## 2023-11-02 RX ADMIN — PANTOPRAZOLE SODIUM 40 MG: 40 INJECTION, POWDER, FOR SOLUTION INTRAVENOUS at 08:56

## 2023-11-02 RX ADMIN — NICOTINE POLACRILEX 2 MG: 2 GUM, CHEWING ORAL at 20:39

## 2023-11-02 RX ADMIN — OXAZEPAM 15 MG: 15 CAPSULE ORAL at 06:02

## 2023-11-02 RX ADMIN — FOLIC ACID 1 MG: 1 TABLET ORAL at 08:56

## 2023-11-02 RX ADMIN — ATORVASTATIN CALCIUM 40 MG: 40 TABLET, FILM COATED ORAL at 17:13

## 2023-11-02 RX ADMIN — CEFTRIAXONE 1000 MG: 1 INJECTION, SOLUTION INTRAVENOUS at 06:02

## 2023-11-02 RX ADMIN — OCTREOTIDE ACETATE 50 MCG/HR: 500 INJECTION, SOLUTION INTRAVENOUS; SUBCUTANEOUS at 16:28

## 2023-11-02 RX ADMIN — OCTREOTIDE ACETATE 50 MCG/HR: 500 INJECTION, SOLUTION INTRAVENOUS; SUBCUTANEOUS at 07:06

## 2023-11-02 RX ADMIN — THIAMINE HCL TAB 100 MG 100 MG: 100 TAB at 08:56

## 2023-11-02 RX ADMIN — PANTOPRAZOLE SODIUM 40 MG: 40 INJECTION, POWDER, FOR SOLUTION INTRAVENOUS at 21:28

## 2023-11-02 RX ADMIN — MAGNESIUM SULFATE HEPTAHYDRATE 2 G: 40 INJECTION, SOLUTION INTRAVENOUS at 07:21

## 2023-11-02 RX ADMIN — HALOPERIDOL 10 MG: 5 TABLET ORAL at 22:21

## 2023-11-02 RX ADMIN — CHLORHEXIDINE GLUCONATE 15 ML: 1.2 RINSE ORAL at 08:56
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
## 2023-11-02 NOTE — CERTIFIED RECOVERY SPECIALIST
Certified  Note    Patient name: Juan Ramon Baker  Location: ICU 07/ICU [address]: [address] [place] [address]  Attending:  Huber Hernandez MD MRN [id number]  : 1965  Age: 60 y.o. Sex: female Date 2023         Substance Use History:     Social History     Substance and Sexual Activity   Alcohol Use Yes    Comment: daily        Social History     Substance and Sexual Activity   Drug Use Not Currently     Consult received to meet with patient. CRS will follow up on Friday afternoon when in [address] [place] [address]. CRS provided recovery resources on AVS in patient instructions.     Becki Enamorado

## 2023-11-02 NOTE — PROGRESS NOTES
Patient Name: Juanpablo Blanco  Patient MRN: [id number]  Date: 11/02/23  Service: [facility]    Lucy Vera is a 60 y.o. female who was admitted with profound anemia and vomiting blood; She had varices banded yesterday and was stable overnight; no recurrent bleeding observed. Discussed w icu team.        Arnie Alert  Blood pressure 110/63, pulse 76, temperature 97.8 °F (36.6 °C), temperature source Oral, resp. rate (!) 33, height 5' 7" (1.702 m), weight 73.3 kg (161 lb 9.6 oz), SpO2 100 %. Physical Exam  Pt looks more alert today. No jaundice. No distress  Hr regular  Breathing nonlabored  Abd is softer. No pain. +bs.   No ankle edema    Laboratory Studies  Lab Results   Component Value Date    CREATININE 0.47 (L) 11/02/2023    BUN 18 11/02/2023    SODIUM 137 11/02/2023    K 3.6 11/02/2023     11/02/2023    CO2 25 11/02/2023    GLUCOSE 183 (H) 03/26/2015    CALCIUM 8.0 (L) 11/02/2023    PROT 7.7 03/26/2015    ALKPHOS 68 11/02/2023    BILITOT 0.53 03/26/2015    AST 32 11/02/2023    ALT 23 11/02/2023     Lab Results   Component Value Date    WBC 5.75 11/02/2023    HGB 9.1 (L) 11/02/2023    HCT 24.2 (L) 11/02/2023     11/02/2023    MCV 97 11/02/2023     Lab Results   Component Value Date    PROTIME 14.8 (H) 11/01/2023    INR 1.14 11/01/2023     No results found for: "CDIFF"    Imaging and Other Studies  Doppler u/s pending    Inhouse Medications       Current Facility-Administered Medications:     cefTRIAXone (ROCEPHIN) IVPB (premix in dextrose) 1,000 mg 50 mL, 1,000 mg, Intravenous, Q24H, Stopped at 11/02/23 0800    chlorhexidine (PERIDEX) 0.12 % oral rinse 15 mL, 15 mL, Mouth/Throat, Q12H [facility], 15 mL at 83/38/63 1059    folic acid (FOLVITE) tablet 1 mg, 1 mg, Oral, Daily, 1 mg at 11/02/23 0856    haloperidol (HALDOL) tablet 10 mg, 10 mg, Oral, HS, 10 mg at 11/01/23 4313    octreotide (SandoSTATIN) 500 mcg in sodium chloride 0.9 % 250 mL infusion, 50 mcg/hr, Intravenous, Continuous, 50 mcg/hr at 11/02/23 0706    ondansetron (ZOFRAN) injection 4 mg, 4 mg, Intravenous, Q8H PRN, 4 mg at 11/01/23 1318    pantoprazole (PROTONIX) injection 40 mg, 40 mg, Intravenous, Q12H NELLY, 40 mg at 11/02/23 0856    thiamine tablet 100 mg, 100 mg, Oral, Daily, 100 mg at 11/02/23 1446      Assessment/Plan:  Assessment:  Cirrhosis with etoh as cause  Variceal bleed s/p banding      Plan:  Advance diet as tolerated to lo salt diet  Change to po PPI bid tomorrow  Repeat egd in 2 mos  Stop octeotide 11/3 if stable  If no ascites, rocephin can be stopped also; if ascites present continue for 72 hrs total.  Pt can follow up with me if desired; also had established care at [facility] but did nto really remain compliant.   Naltrexone could be used to help reduce etoh craving      Gladis Ott MD

## 2023-11-02 NOTE — PROGRESS NOTES
Progress Note - Norma Lancaster 60 y.o. female MRN: [id number]    Unit/Bed#: ICU 07 Encounter: [id number]      Assessment:  Acute blood loss anemia  Hematemesis/UGIB  Cirrhosis due to alcohol use disorder- no previous history of ascites, encephalopathy, varices  Alcohol use disorder  Bipolar disorder  Schizoaffective disorder    Plan:  No signs of alcohol withdrawal- stop serax  Folic acid, thiamine  EGD yesterday with 2 varices no active bleeding s/p bands  PPI IV BID for esophagitis  Octreotide gtt per GI defer length of course  Ceftriaxone for SBP px- 5 days  Transfuse Hgb <7. Recheck Hgb one more time then switch to daily  IV access- has an 18 gauge, 20 gauge, and 22 gauge      home haldol 10 mg qHS  SCDs  Advance diet per GI  Transfer to med/surg    Subjective:   Feels better today. No complaints. No further hematemesis. No bloody bowel movements    Objective:     Vitals: Blood pressure 106/63, pulse 86, temperature 97.8 °F (36.6 °C), temperature source Oral, resp. rate (!) 28, height 5' 7" (1.702 m), weight 73.3 kg (161 lb 9.6 oz), SpO2 97 %. ,Body mass index is 25.31 kg/m².       Intake/Output Summary (Last 24 hours) at 11/2/2023 1256  Last data filed at 11/2/2023 1200  Gross per 24 hour   Intake 1880 ml   Output 2050 ml   Net -170 ml       Vitals:    11/02/23 1209   BP:    Pulse:    Resp:    Temp: 97.8 °F (36.6 °C)   SpO2:      General:  Patient is awake, alert, non-toxic and in no acute respiratory distress  Eyes: PERRL, no scleral icterus  Neck: No JVD  CV:  Regular, +S1 and S2, No murmurs, gallops or rubs appreciated  Lungs: Clear to auscultation bilateral without wheeze, rales or rhonci  Abdomen: Soft, +BS, Non-tender, non-distended  Extremities: No clubbing, cyanosis or edema  Neuro: No focal motor/sensory deficits  Skin: Warm, No rashes or ulcerations      Invasive Devices       Peripheral Intravenous Line  Duration             Peripheral IV 11/01/23 Distal;Left;Ventral (anterior) Forearm 1 day Peripheral IV 11/01/23 Right Antecubital 1 day    Peripheral IV 11/01/23 Right;Ventral (anterior) Forearm 1 day                  Laboratory and Diagnostics  Results from last 7 days   Lab Units 11/02/23  0440 11/01/23  2338 11/01/23  1802 11/01/23  1323 11/01/23  0634 11/01/23  0550   WBC Thousand/uL 5.75  --   --   --   --  12.20*   HEMOGLOBIN g/dL 7.6* 8.9* 7.3* 7.2* 4.9* 5.7*   HEMATOCRIT % 24.2*  --   --   --  17.3* 20.1*   PLATELETS Thousands/uL 165  --   --   --   --  306   NEUTROS PCT % 59  --   --   --   --  73   MONOS PCT % 15*  --   --   --   --  10   EOS PCT % 2  --   --   --   --  1     Results from last 7 days   Lab Units 11/02/23  0440 11/01/23  0550   SODIUM mmol/L 137 137   POTASSIUM mmol/L 3.6 3.4*   CHLORIDE mmol/L 108 103   CO2 mmol/L 25 14*   ANION GAP mmol/L 4 20   BUN mg/dL 18 36*   CREATININE mg/dL 0.47* 0.52*   CALCIUM mg/dL 8.0* 8.7   GLUCOSE RANDOM mg/dL 126 92   ALT U/L 23 24   AST U/L 32 32   ALK PHOS U/L 68 82   ALBUMIN g/dL 3.1* 3.9   TOTAL BILIRUBIN mg/dL 0.82 0.50     Results from last 7 days   Lab Units 11/02/23  0440   MAGNESIUM mg/dL 1.8*      Results from last 7 days   Lab Units 11/01/23  0550   INR  1.14   PTT seconds 25                                    ABG:           MICRO  None    RADIOGRAPHS  Liver Doppler- pending        Lab, Imaging and other studies: I have personally reviewed pertinent reports.    and I have personally reviewed pertinent films in PACS  VTE Pharmacologic Prophylaxis: Reason for no pharmacologic prophylaxis Acute GI bleed  VTE Mechanical Prophylaxis: sequential compression device

## 2023-11-02 NOTE — PLAN OF CARE
Problem: Potential for Falls  Goal: Patient will remain free of falls  Description: INTERVENTIONS:  - Educate patient/family on patient safety including physical limitations  - Instruct patient to call for assistance with activity   - Consult OT/PT to assist with strengthening/mobility   - Keep Call bell within reach  - Keep bed low and locked with side rails adjusted as appropriate  - Keep care items and personal belongings within reach  - Initiate and maintain comfort rounds  - Make Fall Risk Sign visible to staff  - Offer Toileting every 2 Hours, in advance of need  - Initiate/Maintain bed alarm  - Obtain necessary fall risk management equipment:   - Apply yellow socks and bracelet for high fall risk patients  - Consider moving patient to room near nurses station  Outcome: Progressing     Problem: PAIN - ADULT  Goal: Verbalizes/displays adequate comfort level or baseline comfort level  Description: Interventions:  - Encourage patient to monitor pain and request assistance  - Assess pain using appropriate pain scale  - Administer analgesics based on type and severity of pain and evaluate response  - Implement non-pharmacological measures as appropriate and evaluate response  - Consider cultural and social influences on pain and pain management  - Notify physician/advanced practitioner if interventions unsuccessful or patient reports new pain  Outcome: Progressing     Problem: SAFETY ADULT  Goal: Patient will remain free of falls  Description: INTERVENTIONS:  - Educate patient/family on patient safety including physical limitations  - Instruct patient to call for assistance with activity   - Consult OT/PT to assist with strengthening/mobility   - Keep Call bell within reach  - Keep bed low and locked with side rails adjusted as appropriate  - Keep care items and personal belongings within reach  - Initiate and maintain comfort rounds  - Make Fall Risk Sign visible to staff  - Offer Toileting every 2 Hours, in advance of need  - Initiate/Maintain bed alarm  - Obtain necessary fall risk management equipment:   - Apply yellow socks and bracelet for high fall risk patients  - Consider moving patient to room near nurses station  Outcome: Progressing  Goal: Maintain or return to baseline ADL function  Description: INTERVENTIONS:  -  Assess patient's ability to carry out ADLs; assess patient's baseline for ADL function and identify physical deficits which impact ability to perform ADLs (bathing, care of mouth/teeth, toileting, grooming, dressing, etc.)  - Assess/evaluate cause of self-care deficits   - Assess range of motion  - Assess patient's mobility; develop plan if impaired  - Assess patient's need for assistive devices and provide as appropriate  - Encourage maximum independence but intervene and supervise when necessary  - Involve family in performance of ADLs  - Assess for home care needs following discharge   - Consider OT consult to assist with ADL evaluation and planning for discharge  - Provide patient education as appropriate  Outcome: Progressing  Goal: Maintains/Returns to pre admission functional level  Description: INTERVENTIONS:  - Perform BMAT or MOVE assessment daily.   - Set and communicate daily mobility goal to care team and patient/family/caregiver. - Collaborate with rehabilitation services on mobility goals if consulted  - Perform Range of Motion 4 times a day. - Reposition patient every 2 hours.   - Dangle patient 3 times a day  - Stand patient 3 times a day  - Ambulate patient 3 times a day  - Out of bed to chair 3 times a day   - Out of bed for meals 3 times a day  - Out of bed for toileting  - Record patient progress and toleration of activity level   Outcome: Progressing     Problem: Nutrition/Hydration-ADULT  Goal: Nutrient/Hydration intake appropriate for improving, restoring or maintaining nutritional needs  Description: Monitor and assess patient's nutrition/hydration status for malnutrition. Collaborate with interdisciplinary team and initiate plan and interventions as ordered. Monitor patient's weight and dietary intake as ordered or per policy. Utilize nutrition screening tool and intervene as necessary. Determine patient's food preferences and provide high-protein, high-caloric foods as appropriate.      INTERVENTIONS:  - Monitor oral intake, urinary output, labs, and treatment plans  - Assess nutrition and hydration status and recommend course of action  - Evaluate amount of meals eaten  - Assist patient with eating if necessary   - Allow adequate time for meals  - Recommend/ encourage appropriate diets, oral nutritional supplements, and vitamin/mineral supplements  - Order, calculate, and assess calorie counts as needed  - Recommend, monitor, and adjust tube feedings and TPN/PPN based on assessed needs  - Assess need for intravenous fluids  - Provide specific nutrition/hydration education as appropriate  - Include patient/family/caregiver in decisions related to nutrition  Outcome: Progressing     Problem: Prexisting or High Potential for Compromised Skin Integrity  Goal: Skin integrity is maintained or improved  Description: INTERVENTIONS:  - Identify patients at risk for skin breakdown  - Assess and monitor skin integrity  - Assess and monitor nutrition and hydration status  - Monitor labs   - Assess for incontinence   - Turn and reposition patient  - Assist with mobility/ambulation  - Relieve pressure over bony prominences  - Avoid friction and shearing  - Provide appropriate hygiene as needed including keeping skin clean and dry  - Evaluate need for skin moisturizer/barrier cream  - Collaborate with interdisciplinary team   - Patient/family teaching  - Consider wound care consult   Outcome: Progressing     Problem: MOBILITY - ADULT  Goal: Maintain or return to baseline ADL function  Description: INTERVENTIONS:  -  Assess patient's ability to carry out ADLs; assess patient's baseline for ADL function and identify physical deficits which impact ability to perform ADLs (bathing, care of mouth/teeth, toileting, grooming, dressing, etc.)  - Assess/evaluate cause of self-care deficits   - Assess range of motion  - Assess patient's mobility; develop plan if impaired  - Assess patient's need for assistive devices and provide as appropriate  - Encourage maximum independence but intervene and supervise when necessary  - Involve family in performance of ADLs  - Assess for home care needs following discharge   - Consider OT consult to assist with ADL evaluation and planning for discharge  - Provide patient education as appropriate  Outcome: Progressing  Goal: Maintains/Returns to pre admission functional level  Description: INTERVENTIONS:  - Perform BMAT or MOVE assessment daily.   - Set and communicate daily mobility goal to care team and patient/family/caregiver. - Collaborate with rehabilitation services on mobility goals if consulted  - Perform Range of Motion 4 times a day. - Reposition patient every 2 hours.   - Dangle patient 3 times a day  - Stand patient 3 times a day  - Ambulate patient 3 times a day  - Out of bed to chair 3 times a day   - Out of bed for meals 3 times a day  - Out of bed for toileting  - Record patient progress and toleration of activity level   Outcome: Progressing
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
## 2023-11-02 NOTE — DISCHARGE INSTR - OTHER ORDERS
RECOVERY RESOURCES    Remigio Izquierdo, MS, AMFT  Certified   Certified Family   [address] for Recovery  [place], 1st floor  [facility], [address] [facility]  [phone]    Change on [place]  [address]  [facility], [facility] [facility]  [phone]    [facility]  [address] [place], [address] [facility])  [address]  [facility], [address] [facility]  [address] [facility]       [address] [facility], [address] [facility] [phone]      Instructions: Drop in [place] provides breakfast        Conference of [facility]       Instructions: Can help coordinate housing options for you        [facility]        [address] [facility],  [phone]     Instructions: Open Tuesday,  1pm-5pm and Friday and Saturday 11am to 3pm- offer a meal, has CIRCLES OF CARE there  and [id number] [facility] on  who can assist with medical needs. Laundry on [facility])          and Rola Pineda     Instructions:  starting next Tuesday, medical care access as well as ability to wash clothing with assistance of volunteers- dinner is provided for free as well     [facility]  [address] [facility] [address] by appointment only: [phone]                          Office Hours by appointment only: [address]   on Tuesday, Wednesday and Thursday mornings  [employer]: [address]: Tues and Wed 1-4 pm  Laundry Ministry: [place]: Tues 4:30-6:30  [facility] [place]: [address] [facility] Tony Lindsey:                                       , 2-3 pm and last Thursday 10-11 AM  [facility]break of Krys De Oliveira [facility]: Sylvie [facility]:    and , 9-11 am   [place] Nurse: Suzy Sofia, RN: [phone] office  Teams number: [phone]                     Director of [place] Nursing: Emily Corbin, RN: [phone] office,  Teams number: [email or web address] [address]: Breakfast at [place] [employer]: 8-9 am,                                              lunch 11:45-12:45 pm, 4-4:30 pm bag dinner every weekday: [address] [place] [address] [employer]: 12:30-1:30 Friday through Monday: [address], [facility]:  1-5 Tuesday, Wednesday, Thursday;         11-3 Friday and Saturday; [address] [facility] food bank: [address] [facility] [address], every Saturday from 9-11am first 100 people (take ID)  [employer] bank: [address] [place] [address],  [address]: weekdays 9am- first 30 people- may go 1 time/month  Career Link: for education and employment: [address] [place] [address].[facility], [address] [place] [address]: [address] [facility], [address] [place] [address] Avenue: Monday - Saturday, [address], 1st floor,   [facility] [address]: Monday - Saturday, [address], 1st floor, [place] (appt. only with some walk- in hours) [phone] (some evening hours available)  SHARE- Mat office: [address] [facility], 7 [facility] by appt. only   [facility]: [address] [facility] HELIO Lindsey drop-in center weekdays with AA and NA in the evenings.    [facility] at [address] [facility] for Recovery, every other Thursday 5:30-7:30 PM- walk-ins

## 2023-11-03 PROBLEM — E03.9 HYPOTHYROIDISM: Status: ACTIVE | Noted: 2023-11-03

## 2023-11-03 PROBLEM — K59.00 CONSTIPATION: Status: ACTIVE | Noted: 2023-11-03

## 2023-11-03 PROBLEM — E78.5 HYPERLIPIDEMIA: Status: ACTIVE | Noted: 2023-11-03

## 2023-11-03 LAB
ALBUMIN SERPL BCP-MCNC: 3.1 G/DL (ref 3.5–5)
ALP SERPL-CCNC: 63 U/L (ref 34–104)
ALT SERPL W P-5'-P-CCNC: 20 U/L (ref 7–52)
ANION GAP SERPL CALCULATED.3IONS-SCNC: 5 MMOL/L
AST SERPL W P-5'-P-CCNC: 26 U/L (ref 13–39)
BILIRUB SERPL-MCNC: 0.65 MG/DL (ref 0.2–1)
BUN SERPL-MCNC: 9 MG/DL (ref 5–25)
CALCIUM ALBUM COR SERPL-MCNC: 8.7 MG/DL (ref 8.3–10.1)
CALCIUM SERPL-MCNC: 8 MG/DL (ref 8.4–10.2)
CHLORIDE SERPL-SCNC: 110 MMOL/L (ref 96–108)
CO2 SERPL-SCNC: 24 MMOL/L (ref 21–32)
CREAT SERPL-MCNC: 0.42 MG/DL (ref 0.6–1.3)
ERYTHROCYTE [DISTWIDTH] IN BLOOD BY AUTOMATED COUNT: 18.6 % (ref 11.6–15.1)
GFR SERPL CREATININE-BSD FRML MDRD: 112 ML/MIN/1.73SQ M
GLUCOSE SERPL-MCNC: 123 MG/DL (ref 65–140)
HCT VFR BLD AUTO: 23.3 % (ref 34.8–46.1)
HGB BLD-MCNC: 7.5 G/DL (ref 11.5–15.4)
INR PPP: 1.03 (ref 0.84–1.19)
MAGNESIUM SERPL-MCNC: 1.9 MG/DL (ref 1.9–2.7)
MCH RBC QN AUTO: 30.6 PG (ref 26.8–34.3)
MCHC RBC AUTO-ENTMCNC: 32.2 G/DL (ref 31.4–37.4)
MCV RBC AUTO: 95 FL (ref 82–98)
PHOSPHATE SERPL-MCNC: 3.4 MG/DL (ref 2.7–4.5)
PLATELET # BLD AUTO: 173 THOUSANDS/UL (ref 149–390)
PMV BLD AUTO: 10.3 FL (ref 8.9–12.7)
POTASSIUM SERPL-SCNC: 3.7 MMOL/L (ref 3.5–5.3)
PROT SERPL-MCNC: 5.8 G/DL (ref 6.4–8.4)
PROTHROMBIN TIME: 13.7 SECONDS (ref 11.6–14.5)
RBC # BLD AUTO: 2.45 MILLION/UL (ref 3.81–5.12)
SODIUM SERPL-SCNC: 139 MMOL/L (ref 135–147)
TSH SERPL DL<=0.05 MIU/L-ACNC: 0.4 UIU/ML (ref 0.45–4.5)
WBC # BLD AUTO: 5.16 THOUSAND/UL (ref 4.31–10.16)

## 2023-11-03 PROCEDURE — 80053 COMPREHEN METABOLIC PANEL: CPT | Performed by: INTERNAL MEDICINE

## 2023-11-03 PROCEDURE — C9113 INJ PANTOPRAZOLE SODIUM, VIA: HCPCS | Performed by: INTERNAL MEDICINE

## 2023-11-03 PROCEDURE — 85027 COMPLETE CBC AUTOMATED: CPT | Performed by: INTERNAL MEDICINE

## 2023-11-03 PROCEDURE — 85610 PROTHROMBIN TIME: CPT | Performed by: INTERNAL MEDICINE

## 2023-11-03 PROCEDURE — 84443 ASSAY THYROID STIM HORMONE: CPT | Performed by: INTERNAL MEDICINE

## 2023-11-03 PROCEDURE — 83735 ASSAY OF MAGNESIUM: CPT | Performed by: INTERNAL MEDICINE

## 2023-11-03 PROCEDURE — 99232 SBSQ HOSP IP/OBS MODERATE 35: CPT | Performed by: INTERNAL MEDICINE

## 2023-11-03 PROCEDURE — 84100 ASSAY OF PHOSPHORUS: CPT | Performed by: INTERNAL MEDICINE

## 2023-11-03 RX ORDER — PANTOPRAZOLE SODIUM 40 MG/1
40 TABLET, DELAYED RELEASE ORAL
Status: DISCONTINUED | OUTPATIENT
Start: 2023-11-03 | End: 2023-11-04 | Stop reason: HOSPADM

## 2023-11-03 RX ORDER — AMOXICILLIN 250 MG
1 CAPSULE ORAL 2 TIMES DAILY
Status: DISCONTINUED | OUTPATIENT
Start: 2023-11-03 | End: 2023-11-04 | Stop reason: HOSPADM

## 2023-11-03 RX ADMIN — CEFTRIAXONE 1000 MG: 1 INJECTION, SOLUTION INTRAVENOUS at 06:02

## 2023-11-03 RX ADMIN — NICOTINE POLACRILEX 2 MG: 2 GUM, CHEWING ORAL at 05:57

## 2023-11-03 RX ADMIN — THIAMINE HCL TAB 100 MG 100 MG: 100 TAB at 08:45

## 2023-11-03 RX ADMIN — PANTOPRAZOLE SODIUM 40 MG: 40 TABLET, DELAYED RELEASE ORAL at 17:00

## 2023-11-03 RX ADMIN — SENNOSIDES AND DOCUSATE SODIUM 1 TABLET: 8.6; 5 TABLET ORAL at 18:09

## 2023-11-03 RX ADMIN — PANTOPRAZOLE SODIUM 40 MG: 40 INJECTION, POWDER, FOR SOLUTION INTRAVENOUS at 08:45

## 2023-11-03 RX ADMIN — OCTREOTIDE ACETATE 50 MCG/HR: 500 INJECTION, SOLUTION INTRAVENOUS; SUBCUTANEOUS at 02:42

## 2023-11-03 RX ADMIN — HALOPERIDOL 10 MG: 5 TABLET ORAL at 21:39

## 2023-11-03 RX ADMIN — FOLIC ACID 1 MG: 1 TABLET ORAL at 08:45

## 2023-11-03 RX ADMIN — OCTREOTIDE ACETATE 50 MCG/HR: 500 INJECTION, SOLUTION INTRAVENOUS; SUBCUTANEOUS at 23:43

## 2023-11-03 RX ADMIN — FERROUS SULFATE TAB 325 MG (65 MG ELEMENTAL FE) 325 MG: 325 (65 FE) TAB at 08:30

## 2023-11-03 RX ADMIN — OCTREOTIDE ACETATE 50 MCG/HR: 500 INJECTION, SOLUTION INTRAVENOUS; SUBCUTANEOUS at 14:40

## 2023-11-03 RX ADMIN — OXYCODONE HYDROCHLORIDE AND ACETAMINOPHEN 500 MG: 500 TABLET ORAL at 08:45

## 2023-11-03 RX ADMIN — ATORVASTATIN CALCIUM 40 MG: 40 TABLET, FILM COATED ORAL at 17:29

## 2023-11-03 NOTE — CASE MANAGEMENT
Case Management Discharge Planning Note    Patient name Can Osorio  Location [facility] /South 2 Dionna Reinoso* MRN [id number]  : 1965 Date 11/3/2023       Current Admission Date: 2023  Current Admission Diagnosis:Acute blood loss anemia   Patient Active Problem List    Diagnosis Date Noted    Hematemesis 2023    Acute blood loss anemia 2023    Hyponatremia     Alcoholic cirrhosis of liver without ascites ([facility]) 10/22/2019    Hypokalemia 10/21/2019    GI bleed 10/20/2019    Symptomatic anemia 10/20/2019    Bipolar disorder ([facility]) 05/10/2016    Alcohol intoxication ([facility]) 05/10/2016    Alcohol abuse 05/10/2016    Suicidal ideations 05/10/2016    Homicidal ideation 05/10/2016    Tobacco abuse 05/10/2016      LOS (days): 2  Geometric Mean LOS (GMLOS) (days): 3.00  Days to GMLOS:0.7     OBJECTIVE:  Risk of Unplanned Readmission Score: 17.02         Current admission status: Inpatient   Preferred Pharmacy:   [address] [address]  Phone: [phone] Fax: [phone]    [facility]/pharmacy #[id number] [address]  Phone: [phone] Fax: [phone]    Primary Care Provider: No primary care provider on file. Primary Insurance: MEDICARE  Secondary Insurance:     DISCHARGE DETAILS:                                                                                        IMM Given (Date):: 23 (Original placed in scan bin.  Copy handed to pt.)  IMM Given to[de-identified] Patient

## 2023-11-03 NOTE — ASSESSMENT & PLAN NOTE
Mood stable recently discharged from [address] [place] [address] psychiatry. Continue haloperidol.

## 2023-11-03 NOTE — PLAN OF CARE
Problem: Potential for Falls  Goal: Patient will remain free of falls  Description: INTERVENTIONS:  - Educate patient/family on patient safety including physical limitations  - Instruct patient to call for assistance with activity   - Consult OT/PT to assist with strengthening/mobility   - Keep Call bell within reach  - Keep bed low and locked with side rails adjusted as appropriate  - Keep care items and personal belongings within reach  - Initiate and maintain comfort rounds  - Make Fall Risk Sign visible to staff  - Offer Toileting every 2 Hours, in advance of need  - Initiate/Maintain bed alarm  - Obtain necessary fall risk management equipment:   - Apply yellow socks and bracelet for high fall risk patients  - Consider moving patient to room near nurses station  Outcome: Progressing     Problem: PAIN - ADULT  Goal: Verbalizes/displays adequate comfort level or baseline comfort level  Description: Interventions:  - Encourage patient to monitor pain and request assistance  - Assess pain using appropriate pain scale  - Administer analgesics based on type and severity of pain and evaluate response  - Implement non-pharmacological measures as appropriate and evaluate response  - Consider cultural and social influences on pain and pain management  - Notify physician/advanced practitioner if interventions unsuccessful or patient reports new pain  Outcome: Progressing     Problem: INFECTION - ADULT  Goal: Absence or prevention of progression during hospitalization  Description: INTERVENTIONS:  - Assess and monitor for signs and symptoms of infection  - Monitor lab/diagnostic results  - Monitor all insertion sites, i.e. indwelling lines, tubes, and drains  - Monitor endotracheal if appropriate and nasal secretions for changes in amount and color  - Barron appropriate cooling/warming therapies per order  - Administer medications as ordered  - Instruct and encourage patient and family to use good hand hygiene technique  - Identify and instruct in appropriate isolation precautions for identified infection/condition  Outcome: Progressing     Problem: SAFETY ADULT  Goal: Patient will remain free of falls  Description: INTERVENTIONS:  - Educate patient/family on patient safety including physical limitations  - Instruct patient to call for assistance with activity   - Consult OT/PT to assist with strengthening/mobility   - Keep Call bell within reach  - Keep bed low and locked with side rails adjusted as appropriate  - Keep care items and personal belongings within reach  - Initiate and maintain comfort rounds  - Make Fall Risk Sign visible to staff  - Offer Toileting every 2 Hours, in advance of need  - Initiate/Maintain bed alarm  - Obtain necessary fall risk management equipment:   - Apply yellow socks and bracelet for high fall risk patients  - Consider moving patient to room near nurses station  Outcome: Progressing  Goal: Maintain or return to baseline ADL function  Description: INTERVENTIONS:  -  Assess patient's ability to carry out ADLs; assess patient's baseline for ADL function and identify physical deficits which impact ability to perform ADLs (bathing, care of mouth/teeth, toileting, grooming, dressing, etc.)  - Assess/evaluate cause of self-care deficits   - Assess range of motion  - Assess patient's mobility; develop plan if impaired  - Assess patient's need for assistive devices and provide as appropriate  - Encourage maximum independence but intervene and supervise when necessary  - Involve family in performance of ADLs  - Assess for home care needs following discharge   - Consider OT consult to assist with ADL evaluation and planning for discharge  - Provide patient education as appropriate  Outcome: Progressing  Goal: Maintains/Returns to pre admission functional level  Description: INTERVENTIONS:  - Perform BMAT or MOVE assessment daily.   - Set and communicate daily mobility goal to care team and patient/family/caregiver. - Collaborate with rehabilitation services on mobility goals if consulted  - Perform Range of Motion 4 times a day. - Reposition patient every 2 hours.   - Dangle patient 3 times a day  - Stand patient 3 times a day  - Ambulate patient 3 times a day  - Out of bed to chair 3 times a day   - Out of bed for meals 3 times a day  - Out of bed for toileting  - Record patient progress and toleration of activity level   Outcome: Progressing

## 2023-11-03 NOTE — CERTIFIED RECOVERY SPECIALIST
Certified  Note    Patient name: Jaime Hloloway  Location: [facility]: [address]  Attending:  Meseret Prasad DO MRN [id number]  : 1965  Age: 60 y.o. Sex: female Date 11/3/2023         Substance Use History:     Social History     Substance and Sexual Activity   Alcohol Use Yes    Comment: daily        Social History     Substance and Sexual Activity   Drug Use Not Currently       Admission Information  Substances Used at This Admission[de-identified] Alcohol  Readmission in Last 30 Days?: No  Encounter Type[de-identified] Patient Face-to-Face    Recovery Support Plan  Declined All Services?: No  Medication Assisted Treatment[de-identified] No  Agreeable to Warm Handoff?: No  Is Patient Accepting WILL Treatment Services?: No  Was Referral Made to [address] [place] [address]?: No  Was Narcan Provided at Discharge?: No  Plan Discussed With Treatment Team[de-identified] Yes  Plan Discussed With[de-identified]     Referral to Recovery Supports:  [facility][de-identified] No  Community Based CRS[de-identified] No  Case Management[de-identified] No  Direct Access to WILL Treatment?: No  Resource Guide Given?: Yes  Follow Up With Patient[de-identified] No  Family / Other Support[de-identified] No  Referral for Community Physical Health[de-identified] No  Referral for Community Mental Health[de-identified] No'    CRS received consult to meet with patient. CRS provided patient with introductions and explanation of service. CRS pursued patient surrounding needs. Patient shared about alcohol use. Patient shared about her desire to go to meetings and get a sponsor. CRS provided patient with recovery resources and contact information. CRS provided patient with explanation of services that are available as well as 12-meetings that she previously alluded to. CRS encouraged patient to reach out if she needs additional support in the community.         Marsha Banuelos

## 2023-11-03 NOTE — APP STUDENT NOTE
A/P-  Acute blood loss anemia  Pt presented to the ED via EMS with alcoholic intoxication and showed a Hg of 4.9 and BP of 78/41.   - Today Hg 7.5, transfuse if <7   - monitor for continuation of blood loss   - EGD shows 2 grade II varices which were banded   - patient states she has a history of anemia, shows pagophagia   - started on iron supplementation  2. Alcoholic cirrhosis of liver   - US of the liver shows trace upper abdominal ascites   - LFT within normal limits, continue to monitor  3. Alcohol abuse   - WA protocol and monitor for signs of alcohol withdrawal   - continue thiamine and folic acid supplementation   - naltrexone for alcohol cravings  4. Bipolar disorder   - continue outpatient haldol 10 mg   - history of inpatient admissions for psychiatric reasons    VTE Pharmacologic Prophylaxis: VTE Score: 3  Pt currently not on prophylaxis, current GI bleed    Patient Centered Rounds: I performed bedside rounds with nursing staff today. Code Status: Level 2 - DNAR: but accepts endotracheal intubation    Subjective:   Alyson is sitting in her chair and is impulsively chewing on ice. She notes she feels much better compared to the past couple days. She denies chest pain, tremors, shortness of breath, and issues with walking. She denies tremors, and nausea and vomiting since coming to the med/surg floor. Objective:     Vitals:   Temp (24hrs), Av.9 °F (36.6 °C), Min:97.5 °F (36.4 °C), Max:98.5 °F (36.9 °C)    Temp:  [97.5 °F (36.4 °C)-98.5 °F (36.9 °C)] 98.5 °F (36.9 °C)  HR:  [76-90] 83  Resp:  [16-39] 18  BP: ()/() 96/57  SpO2:  [94 %-100 %] 95 %  Body mass index is 25.31 kg/m². Input and Output Summary (last 24 hours): Intake/Output Summary (Last 24 hours) at 11/3/2023 1114  Last data filed at 11/3/2023 0836  Gross per 24 hour   Intake 890 ml   Output 2250 ml   Net -1360 ml       Physical Exam:   Physical Exam  Vitals and nursing note reviewed.    Constitutional: General: She is awake. She is not in acute distress. Appearance: Normal appearance. She is not ill-appearing, toxic-appearing or diaphoretic. HENT:      Head: Normocephalic and atraumatic. Cardiovascular:      Rate and Rhythm: Normal rate and regular rhythm. Heart sounds: No murmur heard. No friction rub. No gallop. Pulmonary:      Effort: Pulmonary effort is normal.      Breath sounds: Normal breath sounds. No wheezing, rhonchi or rales. Abdominal:      General: Bowel sounds are normal.      Tenderness: There is no abdominal tenderness. Musculoskeletal:      Right lower leg: No edema. Left lower leg: No edema. Skin:     General: Skin is warm and dry. Coloration: Skin is not cyanotic, jaundiced or pale. Findings: No bruising or erythema. Neurological:      General: No focal deficit present. Mental Status: She is alert. Mental status is at baseline. Psychiatric:         Attention and Perception: Attention normal.         Mood and Affect: Mood normal.         Speech: Speech normal.         Behavior: Behavior normal. Behavior is cooperative. Additional Data:     Labs:  Results from last 7 days   Lab Units 11/03/23  0506 11/02/23  1316 11/02/23  0440   WBC Thousand/uL 5.16  --  5.75   HEMOGLOBIN g/dL 7.5*   < > 7.6*   HEMATOCRIT % 23.3*  --  24.2*   PLATELETS Thousands/uL 173  --  165   NEUTROS PCT %  --   --  59   LYMPHS PCT %  --   --  23   MONOS PCT %  --   --  15*   EOS PCT %  --   --  2    < > = values in this interval not displayed.      Results from last 7 days   Lab Units 11/03/23  0506   SODIUM mmol/L 139   POTASSIUM mmol/L 3.7   CHLORIDE mmol/L 110*   CO2 mmol/L 24   BUN mg/dL 9   CREATININE mg/dL 0.42*   ANION GAP mmol/L 5   CALCIUM mg/dL 8.0*   ALBUMIN g/dL 3.1*   TOTAL BILIRUBIN mg/dL 0.65   ALK PHOS U/L 63   ALT U/L 20   AST U/L 26   GLUCOSE RANDOM mg/dL 123     Results from last 7 days   Lab Units 11/03/23  0506   INR  1.03 Lines/Drains:  Invasive Devices       Peripheral Intravenous Line  Duration             Peripheral IV 11/01/23 Distal;Left;Ventral (anterior) Forearm 2 days    Peripheral IV 11/01/23 Right Antecubital 2 days    Peripheral IV 11/01/23 Right;Ventral (anterior) Forearm 2 days                          Imaging: Reviewed radiology reports from this admission including: ultrasound(s)    Recent Cultures (last 7 days):         Last 24 Hours Medication List:   Current Facility-Administered Medications   Medication Dose Route Frequency Provider Last Rate    ascorbic acid  500 mg Oral Daily Hari Nailer, CRNP      atorvastatin  40 mg Oral Daily With [employer], CRNP      cefTRIAXone  1,000 mg Intravenous Q24H Michael Carroll, DO 1,000 mg (11/03/23 0602)    ferrous sulfate  325 mg Oral Daily With Breakfast [place] Nailer, CRNP      folic acid  1 mg Oral Daily Lalo Koehler, DO      haloperidol  10 mg Oral HS Lalo Koehler, DO      nicotine polacrilex  2 mg Oral Q2H PRN Michael Carroll, DO      octreotide  50 mcg/hr Intravenous Continuous Hari Nailer, CRNP 50 mcg/hr (11/03/23 0242)    ondansetron  4 mg Intravenous Q8H PRN Lalo Koehler, DO      pantoprazole  40 mg Oral BID AC Bernard Azar MD      thiamine  100 mg Oral Daily Michael Carroll DO          Today, Patient Was Seen By: Parvin Jesus    **Please Note: This note may have been constructed using a voice recognition system. **

## 2023-11-03 NOTE — PROGRESS NOTES
Patient Name: Can Osorio  Patient MRN: [id number]  Date: 11/03/23  Service: [facility]    Subjective   Patient alert and awake. Was in bathroom. Did have dark stool. No complaints of abdominal pain. He ate solid breakfast this morning. Vitals  Blood pressure 96/57, pulse 83, temperature 98.5 °F (36.9 °C), temperature source Oral, resp. rate 18, height 5' 7" (1.702 m), weight 73.3 kg (161 lb 9.6 oz), SpO2 95 %. Abdomen soft and nontender.     Laboratory Studies  Results from last 7 days   Lab Units 11/03/23  0506 11/02/23  1316 11/02/23  0440 11/01/23  2338 11/01/23  1802 11/01/23  1323 11/01/23  0634 11/01/23  0550   WBC Thousand/uL 5.16  --  5.75  --   --   --   --  12.20*   HEMOGLOBIN g/dL 7.5* 9.1* 7.6* 8.9* 7.3* 7.2* 4.9* 5.7*   HEMATOCRIT % 23.3*  --  24.2*  --   --   --  17.3* 20.1*   PLATELETS Thousands/uL 173  --  165  --   --   --   --  306   INR  1.03  --   --   --   --   --   --  1.14     Results from last 7 days   Lab Units 11/03/23  0506 11/02/23  0440 11/01/23  0550   POTASSIUM mmol/L 3.7 3.6 3.4*   CHLORIDE mmol/L 110* 108 103   CO2 mmol/L 24 25 14*   BUN mg/dL 9 18 36*   CREATININE mg/dL 0.42* 0.47* 0.52*   CALCIUM mg/dL 8.0* 8.0* 8.7   ALK PHOS U/L 63 68 82   ALT U/L 20 23 24   AST U/L 26 32 32       Imaging and Other Studies      Inhouse Medications     Current Facility-Administered Medications:     ascorbic acid (VITAMIN C) tablet 500 mg, 500 mg, Oral, Daily, 500 mg at 11/03/23 0845    atorvastatin (LIPITOR) tablet 40 mg, 40 mg, Oral, Daily With Dinner, 40 mg at 11/02/23 1713    cefTRIAXone (ROCEPHIN) IVPB (premix in dextrose) 1,000 mg 50 mL, 1,000 mg, Intravenous, Q24H, 1,000 mg at 11/03/23 0602    ferrous sulfate tablet 325 mg, 325 mg, Oral, Daily With Breakfast, 325 mg at 72/12/25 7372    folic acid (FOLVITE) tablet 1 mg, 1 mg, Oral, Daily, 1 mg at 11/03/23 0845    haloperidol (HALDOL) tablet 10 mg, 10 mg, Oral, HS, 10 mg at 11/02/23 2221    nicotine polacrilex (NICORETTE) gum 2 mg, 2 mg, Oral, Q2H PRN, 2 mg at 11/03/23 0557    octreotide (SandoSTATIN) 500 mcg in sodium chloride 0.9 % 250 mL infusion, 50 mcg/hr, Intravenous, Continuous, 50 mcg/hr at 11/03/23 0242    ondansetron (ZOFRAN) injection 4 mg, 4 mg, Intravenous, Q8H PRN, 4 mg at 11/01/23 1318    pantoprazole (PROTONIX) EC tablet 40 mg, 40 mg, Oral, BID AC    thiamine tablet 100 mg, 100 mg, Oral, Daily, 100 mg at 11/03/23 0845      Assessment/Plan:  Status post GI bleed secondary to esophageal varices. Cirrhosis secondary to alcohol  Blood loss anemia. As per note yesterday. Patient did have small amount of ascites and likely should continue Rocephin for total 72 hours. Since still having dark stools would continue octrerotide until a.m.. Hopefully dark stools are from prior GI bleeding.           Nora Strong MD

## 2023-11-03 NOTE — PROGRESS NOTES
[address] [facility]  Progress Note  Name: Nan Arambula  MRN: [id number]  Unit/Bed#: [facility]ille[facility] -01 I Date of Admission: 11/1/2023   Date of Service: 11/3/2023 I Hospital Day: 2    Assessment/Plan   * Acute blood loss anemia  Assessment & Plan  History of alcoholic cirrhosis hypothyroidism and mood disorder presents to the hospital after being found with decreased consciousness [place] a motel  Found to have profound anemia 4.9 secondary to upper GI bleeding  Admitted to ICU and had EGD with variceal banding. Status post 3 unit PRBC. Continue IV ceftriaxone, octreotide, and PPI per GI. Discharge when cleared by GI. Results from last 7 days   Lab Units 11/03/23  0506 11/02/23  1316 11/02/23  0440 11/01/23  2338   HEMOGLOBIN g/dL 7.5* 9.1* 7.6* 8.9*       Hyperlipidemia  Assessment & Plan  Continue atorvastatin    Constipation  Assessment & Plan  Start senna/docusate    Hypothyroidism  Assessment & Plan  Patient reports no longer on levothyroxine. TSH this admission okay    Alcoholic cirrhosis of liver without ascites (HCC)  Assessment & Plan  Alcoholic cirrhosis of liver should follow-up with hepatology after discharge    Results from last 7 days   Lab Units 11/03/23  0506 11/02/23  0440 11/01/23  0550   TOTAL BILIRUBIN mg/dL 0.65 0.82 0.50       Alcohol abuse  Assessment & Plan  Continue thiamine and folic acid. Bipolar disorder [facility])  Assessment & Plan  Mood stable recently discharged from [address] [place] [address]. Continue haloperidol. VTE Pharmacologic Prophylaxis: VTE Score: 3 Moderate Risk (Score 3-4) - Pharmacological DVT Prophylaxis Contraindicated. Sequential Compression Devices Ordered. Patient Centered Rounds: I have performed bedside rounds with nursing staff today. Discussions with Specialists or Other Care Team Provider: Case management    Education and Discussions with Family / Patient:     Time Spent for Care:    This time was spent on one or more of the following: performing physical exam; counseling and coordination of care; obtaining or reviewing history; documenting in the medical record; reviewing/ordering tests, medications or procedures; communicating with other healthcare professionals and discussing with patient's family/caregivers. Current Length of Stay: 2 day(s)  Current Patient Status: Inpatient   Certification Statement: The patient will continue to require additional inpatient hospital stay due to symptomatic anemia. GI  Discharge Plan: Anticipate discharge in 24-48 hrs to home. Code Status: Level 2 - DNAR: but accepts endotracheal intubation      Subjective:   Patient seen and examined. Complains of constipation. Otherwise feeling relatively well    Objective:   Vitals: Blood pressure 96/57, pulse 83, temperature 98.5 °F (36.9 °C), temperature source Oral, resp. rate 18, height 5' 7" (1.702 m), weight 73.3 kg (161 lb 9.6 oz), SpO2 95 %. Intake/Output Summary (Last 24 hours) at 11/3/2023 1744  Last data filed at 11/3/2023 1300  Gross per 24 hour   Intake 1200 ml   Output 800 ml   Net 400 ml       Physical Exam  Vitals reviewed. Constitutional:       General: She is not in acute distress. Appearance: Normal appearance. HENT:      Head: Atraumatic. Cardiovascular:      Rate and Rhythm: Regular rhythm. Pulmonary:      Breath sounds: Decreased breath sounds present. No wheezing. Abdominal:      General: Bowel sounds are normal.      Palpations: Abdomen is soft. Tenderness: There is no guarding or rebound. Musculoskeletal:         General: No swelling. Skin:     General: Skin is warm. Neurological:      Mental Status: She is alert.    Psychiatric:         Mood and Affect: Mood normal.       Additional Data:   Labs:  Results from last 7 days   Lab Units 11/03/23  0506 11/02/23  1316 11/02/23  0440 11/01/23  0634 11/01/23  0550   WBC Thousand/uL 5.16  --  5.75  --  12.20*   HEMOGLOBIN g/dL 7.5* 9.1* 7.6*   < > 5.7* PLATELETS Thousands/uL 173  --  165  --  306   MCV fL 95  --  97  --  112*   INR  1.03  --   --   --  1.14    < > = values in this interval not displayed. Results from last 7 days   Lab Units 11/03/23  0506 11/02/23  0440 11/01/23  0550   SODIUM mmol/L 139 137 137   POTASSIUM mmol/L 3.7 3.6 3.4*   CHLORIDE mmol/L 110* 108 103   CO2 mmol/L 24 25 14*   ANION GAP mmol/L 5 4 20   BUN mg/dL 9 18 36*   CREATININE mg/dL 0.42* 0.47* 0.52*   CALCIUM mg/dL 8.0* 8.0* 8.7   ALBUMIN g/dL 3.1* 3.1* 3.9   TOTAL BILIRUBIN mg/dL 0.65 0.82 0.50   ALK PHOS U/L 63 68 82   ALT U/L 20 23 24   AST U/L 26 32 32   EGFR ml/min/1.73sq m 112 108 104   GLUCOSE RANDOM mg/dL 123 126 92     Results from last 7 days   Lab Units 11/03/23  0506 11/02/23  0440   MAGNESIUM mg/dL 1.9 1.8*   PHOSPHORUS mg/dL 3.4  --                               Results from last 7 days   Lab Units 11/03/23  0506   TSH 3RD GENERATON uIU/mL 0.395*     * I Have Reviewed All Lab Data Listed Above. Cultures:                   Lines/Drains:  Invasive Devices       Peripheral Intravenous Line  Duration             Peripheral IV 11/01/23 Distal;Left;Ventral (anterior) Forearm 2 days    Peripheral IV 11/01/23 Right Antecubital 2 days    Peripheral IV 11/01/23 Right;Ventral (anterior) Forearm 2 days                  Telemetry:      Imaging:  Imaging Reports Reviewed Today Include:   US liver doppler only    Result Date: 11/2/2023  Impression: Normal Doppler ultrasound evaluation of hepatic vasculature. Trace upper abdominal ascites. Workstation performed: [id number]     EGD    Result Date: 11/1/2023  Impression: Meade's esophagus Two grade II varices (red giacomo sign); placed 7 bands successfully and 0 bands unsuccessfully The duodenum appeared normal. Mild portal gastropathy; no active bleeding encountered. Hi suspicion of varices as cause for upper gi bleedin RECOMMENDATION: See progress note.   Repeat in 2 mos  Radha Renner MD       Scheduled Meds:  Current Facility-Administered Medications   Medication Dose Route Frequency Provider Last Rate    ascorbic acid  500 mg Oral Daily KARLEY Adams      atorvastatin  40 mg Oral Daily With CitiKARLEY gunter      cefTRIAXone  1,000 mg Intravenous Q24H Arnoldo Montes DO 1,000 mg (11/03/23 0602)    ferrous sulfate  325 mg Oral Daily With Breakfast KARLEY dAams      folic acid  1 mg Oral Daily Lalo Koehler DO      haloperidol  10 mg Oral HS Lalo Koehler DO      nicotine polacrilex  2 mg Oral Q2H PRN Arnoldo Montes DO      octreotide  50 mcg/hr Intravenous Continuous KARLEY Adams 50 mcg/hr (11/03/23 1440)    ondansetron  4 mg Intravenous Q8H PRN Arnoldo Monets DO      pantoprazole  40 mg Oral BID AC Naman Perez MD      thiamine  100 mg Oral Daily Arnoldo Montes DO         Today, Patient Was Seen By: Arnoldo Montes DO    ** Please Note: Dictation voice to text software may have been used in the creation of this document.  **

## 2023-11-03 NOTE — PLAN OF CARE
Problem: Potential for Falls  Goal: Patient will remain free of falls  Description: INTERVENTIONS:  - Educate patient/family on patient safety including physical limitations  - Instruct patient to call for assistance with activity   - Consult OT/PT to assist with strengthening/mobility   - Keep Call bell within reach  - Keep bed low and locked with side rails adjusted as appropriate  - Keep care items and personal belongings within reach  - Initiate and maintain comfort rounds  - Make Fall Risk Sign visible to staff  - Offer Toileting every 2 Hours, in advance of need  - Initiate/Maintain bed alarm  - Obtain necessary fall risk management equipment:   - Apply yellow socks and bracelet for high fall risk patients  - Consider moving patient to room near nurses station  Outcome: Progressing     Problem: PAIN - ADULT  Goal: Verbalizes/displays adequate comfort level or baseline comfort level  Description: Interventions:  - Encourage patient to monitor pain and request assistance  - Assess pain using appropriate pain scale  - Administer analgesics based on type and severity of pain and evaluate response  - Implement non-pharmacological measures as appropriate and evaluate response  - Consider cultural and social influences on pain and pain management  - Notify physician/advanced practitioner if interventions unsuccessful or patient reports new pain  Outcome: Progressing     Problem: INFECTION - ADULT  Goal: Absence or prevention of progression during hospitalization  Description: INTERVENTIONS:  - Assess and monitor for signs and symptoms of infection  - Monitor lab/diagnostic results  - Monitor all insertion sites, i.e. indwelling lines, tubes, and drains  - Monitor endotracheal if appropriate and nasal secretions for changes in amount and color  - [place] appropriate cooling/warming therapies per order  - Administer medications as ordered  - Instruct and encourage patient and family to use good hand hygiene technique  - Identify and instruct in appropriate isolation precautions for identified infection/condition  Outcome: Progressing     Problem: SAFETY ADULT  Goal: Patient will remain free of falls  Description: INTERVENTIONS:  - Educate patient/family on patient safety including physical limitations  - Instruct patient to call for assistance with activity   - Consult OT/PT to assist with strengthening/mobility   - Keep Call bell within reach  - Keep bed low and locked with side rails adjusted as appropriate  - Keep care items and personal belongings within reach  - Initiate and maintain comfort rounds  - Make Fall Risk Sign visible to staff  - Offer Toileting every 2 Hours, in advance of need  - Initiate/Maintain bed alarm  - Obtain necessary fall risk management equipment:   - Apply yellow socks and bracelet for high fall risk patients  - Consider moving patient to room near nurses station  Outcome: Progressing  Goal: Maintain or return to baseline ADL function  Description: INTERVENTIONS:  -  Assess patient's ability to carry out ADLs; assess patient's baseline for ADL function and identify physical deficits which impact ability to perform ADLs (bathing, care of mouth/teeth, toileting, grooming, dressing, etc.)  - Assess/evaluate cause of self-care deficits   - Assess range of motion  - Assess patient's mobility; develop plan if impaired  - Assess patient's need for assistive devices and provide as appropriate  - Encourage maximum independence but intervene and supervise when necessary  - Involve family in performance of ADLs  - Assess for home care needs following discharge   - Consider OT consult to assist with ADL evaluation and planning for discharge  - Provide patient education as appropriate  Outcome: Progressing  Goal: Maintains/Returns to pre admission functional level  Description: INTERVENTIONS:  - Perform BMAT or MOVE assessment daily.   - Set and communicate daily mobility goal to care team and patient/family/caregiver. - Collaborate with rehabilitation services on mobility goals if consulted  - Perform Range of Motion 4 times a day. - Reposition patient every 2 hours. - Dangle patient 3 times a day  - Stand patient 3 times a day  - Ambulate patient 3 times a day  - Out of bed to chair 3 times a day   - Out of bed for meals 3 times a day  - Out of bed for toileting  - Record patient progress and toleration of activity level   Outcome: Progressing     Problem: DISCHARGE PLANNING  Goal: Discharge to home or other facility with appropriate resources  Description: INTERVENTIONS:  - Identify barriers to discharge w/patient and caregiver  - Arrange for needed discharge resources and transportation as appropriate  - Identify discharge learning needs (meds, wound care, etc.)  - Arrange for interpretive services to assist at discharge as needed  - Refer to Case Management Department for coordinating discharge planning if the patient needs post-hospital services based on physician/advanced practitioner order or complex needs related to functional status, cognitive ability, or social support system  Outcome: Progressing     Problem: Knowledge Deficit  Goal: Patient/family/caregiver demonstrates understanding of disease process, treatment plan, medications, and discharge instructions  Description: Complete learning assessment and assess knowledge base. Interventions:  - Provide teaching at level of understanding  - Provide teaching via preferred learning methods  Outcome: Progressing     Problem: Nutrition/Hydration-ADULT  Goal: Nutrient/Hydration intake appropriate for improving, restoring or maintaining nutritional needs  Description: Monitor and assess patient's nutrition/hydration status for malnutrition. Collaborate with interdisciplinary team and initiate plan and interventions as ordered. Monitor patient's weight and dietary intake as ordered or per policy.  Utilize nutrition screening tool and intervene as necessary. Determine patient's food preferences and provide high-protein, high-caloric foods as appropriate.      INTERVENTIONS:  - Monitor oral intake, urinary output, labs, and treatment plans  - Assess nutrition and hydration status and recommend course of action  - Evaluate amount of meals eaten  - Assist patient with eating if necessary   - Allow adequate time for meals  - Recommend/ encourage appropriate diets, oral nutritional supplements, and vitamin/mineral supplements  - Order, calculate, and assess calorie counts as needed  - Recommend, monitor, and adjust tube feedings and TPN/PPN based on assessed needs  - Assess need for intravenous fluids  - Provide specific nutrition/hydration education as appropriate  - Include patient/family/caregiver in decisions related to nutrition  Outcome: Progressing     Problem: Prexisting or High Potential for Compromised Skin Integrity  Goal: Skin integrity is maintained or improved  Description: INTERVENTIONS:  - Identify patients at risk for skin breakdown  - Assess and monitor skin integrity  - Assess and monitor nutrition and hydration status  - Monitor labs   - Assess for incontinence   - Turn and reposition patient  - Assist with mobility/ambulation  - Relieve pressure over bony prominences  - Avoid friction and shearing  - Provide appropriate hygiene as needed including keeping skin clean and dry  - Evaluate need for skin moisturizer/barrier cream  - Collaborate with interdisciplinary team   - Patient/family teaching  - Consider wound care consult   Outcome: Progressing     Problem: MOBILITY - ADULT  Goal: Maintain or return to baseline ADL function  Description: INTERVENTIONS:  -  Assess patient's ability to carry out ADLs; assess patient's baseline for ADL function and identify physical deficits which impact ability to perform ADLs (bathing, care of mouth/teeth, toileting, grooming, dressing, etc.)  - Assess/evaluate cause of self-care deficits   - Assess range of motion  - Assess patient's mobility; develop plan if impaired  - Assess patient's need for assistive devices and provide as appropriate  - Encourage maximum independence but intervene and supervise when necessary  - Involve family in performance of ADLs  - Assess for home care needs following discharge   - Consider OT consult to assist with ADL evaluation and planning for discharge  - Provide patient education as appropriate  Outcome: Progressing  Goal: Maintains/Returns to pre admission functional level  Description: INTERVENTIONS:  - Perform BMAT or MOVE assessment daily.   - Set and communicate daily mobility goal to care team and patient/family/caregiver. - Collaborate with rehabilitation services on mobility goals if consulted  - Perform Range of Motion 4 times a day. - Reposition patient every 2 hours.   - Dangle patient 3 times a day  - Stand patient 3 times a day  - Ambulate patient 3 times a day  - Out of bed to chair 3 times a day   - Out of bed for meals 3 times a day  - Out of bed for toileting  - Record patient progress and toleration of activity level   Outcome: Progressing     Problem: INFECTION - ADULT  Goal: Absence of fever/infection during neutropenic period  Description: INTERVENTIONS:  - Monitor WBC    Outcome: Completed

## 2023-11-03 NOTE — CASE MANAGEMENT
Case Management Discharge Planning Note    Patient name Norma Lancaster  Location [place] [facility] [address] [facility] [facility]* MRN [id number]  : 1965 Date 11/3/2023       Current Admission Date: 2023  Current Admission Diagnosis:Acute blood loss anemia   Patient Active Problem List    Diagnosis Date Noted    Hematemesis 2023    Acute blood loss anemia 2023    Hyponatremia     Alcoholic cirrhosis of liver without ascites ([facility]) 10/22/2019    Hypokalemia 10/21/2019    GI bleed 10/20/2019    Symptomatic anemia 10/20/2019    Bipolar disorder ([facility]) 05/10/2016    Alcohol intoxication ([facility]) 05/10/2016    Alcohol abuse 05/10/2016    Suicidal ideations 05/10/2016    Homicidal ideation 05/10/2016    Tobacco abuse 05/10/2016      LOS (days): 2  Geometric Mean LOS (GMLOS) (days): 3.00  Days to GMLOS:1.1     OBJECTIVE:  Risk of Unplanned Readmission Score: 15.97         Current admission status: Inpatient   Preferred Pharmacy:   [address] [address]  Phone: [phone] Fax: [phone]    [id number] [place] [address]  Phone: [phone] Fax: [phone]    Primary Care Provider: No primary care provider on file.     Primary Insurance: MEDICARE  Secondary Insurance:     DISCHARGE DETAILS:                                          Other Referral/Resources/Interventions Provided:  Interventions: PCP, Substance Abuse Treatment, Other (Specify) (Information for Dr Coco Farris (prior PCP) and [facility] placed on AVS as well as homeless resources on AVS)

## 2023-11-03 NOTE — ASSESSMENT & PLAN NOTE
Alcoholic cirrhosis of liver should follow-up with hepatology after discharge    Results from last 7 days   Lab Units 11/03/23  0506 11/02/23  0440 11/01/23  0550   TOTAL BILIRUBIN mg/dL 0.65 0.82 0.50

## 2023-11-03 NOTE — CERTIFIED RECOVERY SPECIALIST
Certified  Note    Patient name: Nallely Arora  Location: [address]/[facility] [employer]: [address]  Attending:  Meredith Briceno DO MRN [id number]  : 1965  Age: 60 y.o. Sex: female Date 11/3/2023         Substance Use History:     Social History     Substance and Sexual Activity   Alcohol Use Yes    Comment: daily        Social History     Substance and Sexual Activity   Drug Use Not Currently       CRS attempted contact, patient in bathroom at time of entry.   Will follow up after lunch  Recovery resources placed on AVS patient instructions  Sisi Han

## 2023-11-03 NOTE — ASSESSMENT & PLAN NOTE
History of alcoholic cirrhosis hypothyroidism and mood disorder presents to the hospital after being found with decreased consciousness in a motel  Found to have profound anemia 4.9 secondary to upper GI bleeding  Admitted to ICU and had EGD with variceal banding. Status post 3 unit PRBC. Continue IV ceftriaxone, octreotide, and PPI per GI. Discharge when cleared by GI.     Results from last 7 days   Lab Units 11/03/23  0506 11/02/23  1316 11/02/23  0440 11/01/23  2338   HEMOGLOBIN g/dL 7.5* 9.1* 7.6* 8.9*

## 2023-11-04 ENCOUNTER — PREP FOR PROCEDURE (OUTPATIENT)
Dept: GASTROENTEROLOGY | Facility: CLINIC | Age: 58
End: 2023-11-04

## 2023-11-04 VITALS
DIASTOLIC BLOOD PRESSURE: 61 MMHG | BODY MASS INDEX: 25.36 KG/M2 | RESPIRATION RATE: 16 BRPM | TEMPERATURE: 98.3 F | HEART RATE: 84 BPM | OXYGEN SATURATION: 95 % | WEIGHT: 161.6 LBS | SYSTOLIC BLOOD PRESSURE: 100 MMHG | HEIGHT: 67 IN

## 2023-11-04 DIAGNOSIS — I85.00 VARICES OF ESOPHAGUS DETERMINED BY ENDOSCOPY (HCC): Primary | ICD-10-CM

## 2023-11-04 LAB
ALBUMIN SERPL BCP-MCNC: 3.4 G/DL (ref 3.5–5)
ALP SERPL-CCNC: 70 U/L (ref 34–104)
ALT SERPL W P-5'-P-CCNC: 17 U/L (ref 7–52)
ANION GAP SERPL CALCULATED.3IONS-SCNC: 5 MMOL/L
AST SERPL W P-5'-P-CCNC: 20 U/L (ref 13–39)
BILIRUB SERPL-MCNC: 0.61 MG/DL (ref 0.2–1)
BUN SERPL-MCNC: 12 MG/DL (ref 5–25)
CALCIUM ALBUM COR SERPL-MCNC: 9 MG/DL (ref 8.3–10.1)
CALCIUM SERPL-MCNC: 8.5 MG/DL (ref 8.4–10.2)
CHLORIDE SERPL-SCNC: 108 MMOL/L (ref 96–108)
CO2 SERPL-SCNC: 23 MMOL/L (ref 21–32)
CREAT SERPL-MCNC: 0.51 MG/DL (ref 0.6–1.3)
ERYTHROCYTE [DISTWIDTH] IN BLOOD BY AUTOMATED COUNT: 17.9 % (ref 11.6–15.1)
GFR SERPL CREATININE-BSD FRML MDRD: 105 ML/MIN/1.73SQ M
GLUCOSE SERPL-MCNC: 112 MG/DL (ref 65–140)
HCT VFR BLD AUTO: 26.2 % (ref 34.8–46.1)
HGB BLD-MCNC: 8.2 G/DL (ref 11.5–15.4)
MCH RBC QN AUTO: 30.5 PG (ref 26.8–34.3)
MCHC RBC AUTO-ENTMCNC: 31.3 G/DL (ref 31.4–37.4)
MCV RBC AUTO: 97 FL (ref 82–98)
PLATELET # BLD AUTO: 204 THOUSANDS/UL (ref 149–390)
PMV BLD AUTO: 10.6 FL (ref 8.9–12.7)
POTASSIUM SERPL-SCNC: 3.6 MMOL/L (ref 3.5–5.3)
PROT SERPL-MCNC: 6.3 G/DL (ref 6.4–8.4)
RBC # BLD AUTO: 2.69 MILLION/UL (ref 3.81–5.12)
SODIUM SERPL-SCNC: 136 MMOL/L (ref 135–147)
WBC # BLD AUTO: 5.46 THOUSAND/UL (ref 4.31–10.16)

## 2023-11-04 PROCEDURE — 99232 SBSQ HOSP IP/OBS MODERATE 35: CPT | Performed by: INTERNAL MEDICINE

## 2023-11-04 PROCEDURE — 99239 HOSP IP/OBS DSCHRG MGMT >30: CPT | Performed by: INTERNAL MEDICINE

## 2023-11-04 PROCEDURE — 80053 COMPREHEN METABOLIC PANEL: CPT | Performed by: INTERNAL MEDICINE

## 2023-11-04 PROCEDURE — 85027 COMPLETE CBC AUTOMATED: CPT | Performed by: INTERNAL MEDICINE

## 2023-11-04 RX ORDER — FOLIC ACID 1 MG/1
1 TABLET ORAL DAILY
Qty: 30 TABLET | Refills: 0 | Status: SHIPPED | OUTPATIENT
Start: 2023-11-05

## 2023-11-04 RX ORDER — FOLIC ACID 1 MG/1
1 TABLET ORAL DAILY
Qty: 30 TABLET | Refills: 0 | Status: SHIPPED | OUTPATIENT
Start: 2023-11-05 | End: 2023-11-04 | Stop reason: SDUPTHER

## 2023-11-04 RX ORDER — LANOLIN ALCOHOL/MO/W.PET/CERES
100 CREAM (GRAM) TOPICAL DAILY
Qty: 30 TABLET | Refills: 0 | Status: SHIPPED | OUTPATIENT
Start: 2023-11-04

## 2023-11-04 RX ORDER — CARVEDILOL 3.12 MG/1
3.12 TABLET ORAL 2 TIMES DAILY WITH MEALS
Qty: 60 TABLET | Refills: 0 | Status: SHIPPED | OUTPATIENT
Start: 2023-11-04

## 2023-11-04 RX ORDER — PANTOPRAZOLE SODIUM 40 MG/1
40 TABLET, DELAYED RELEASE ORAL
Qty: 60 TABLET | Refills: 0 | Status: SHIPPED | OUTPATIENT
Start: 2023-11-04

## 2023-11-04 RX ORDER — CARVEDILOL 3.12 MG/1
3.12 TABLET ORAL 2 TIMES DAILY WITH MEALS
Qty: 180 TABLET | Refills: 1 | Status: SHIPPED | OUTPATIENT
Start: 2023-11-04 | End: 2023-11-04 | Stop reason: SDUPTHER

## 2023-11-04 RX ORDER — LACTULOSE 20 G/30ML
10 SOLUTION ORAL 3 TIMES DAILY
Qty: 946 ML | Refills: 0 | Status: SHIPPED | OUTPATIENT
Start: 2023-11-04

## 2023-11-04 RX ORDER — LANOLIN ALCOHOL/MO/W.PET/CERES
100 CREAM (GRAM) TOPICAL DAILY
Qty: 30 TABLET | Refills: 0 | Status: SHIPPED | OUTPATIENT
Start: 2023-11-04 | End: 2023-11-04 | Stop reason: SDUPTHER

## 2023-11-04 RX ORDER — LACTULOSE 20 G/30ML
10 SOLUTION ORAL 3 TIMES DAILY
Qty: 4050 ML | Refills: 1 | Status: SHIPPED | OUTPATIENT
Start: 2023-11-04 | End: 2023-11-04 | Stop reason: SDUPTHER

## 2023-11-04 RX ORDER — PANTOPRAZOLE SODIUM 40 MG/1
40 TABLET, DELAYED RELEASE ORAL
Qty: 60 TABLET | Refills: 0 | Status: SHIPPED | OUTPATIENT
Start: 2023-11-04 | End: 2023-11-04 | Stop reason: SDUPTHER

## 2023-11-04 RX ADMIN — SENNOSIDES AND DOCUSATE SODIUM 1 TABLET: 8.6; 5 TABLET ORAL at 08:14

## 2023-11-04 RX ADMIN — THIAMINE HCL TAB 100 MG 100 MG: 100 TAB at 08:13

## 2023-11-04 RX ADMIN — OXYCODONE HYDROCHLORIDE AND ACETAMINOPHEN 500 MG: 500 TABLET ORAL at 08:13

## 2023-11-04 RX ADMIN — OCTREOTIDE ACETATE 50 MCG/HR: 500 INJECTION, SOLUTION INTRAVENOUS; SUBCUTANEOUS at 09:55

## 2023-11-04 RX ADMIN — FOLIC ACID 1 MG: 1 TABLET ORAL at 08:13

## 2023-11-04 RX ADMIN — PANTOPRAZOLE SODIUM 40 MG: 40 TABLET, DELAYED RELEASE ORAL at 08:13

## 2023-11-04 RX ADMIN — CEFTRIAXONE 1000 MG: 1 INJECTION, SOLUTION INTRAVENOUS at 08:14

## 2023-11-04 NOTE — ASSESSMENT & PLAN NOTE
Mood stable recently discharged from [address] [place] [address] psychiatry. Continue haloperidol.

## 2023-11-04 NOTE — PROGRESS NOTES
Progress Note -  [facility]  Patient: Norma Lancaster 60 y.o. female   MRN: [id number]  PCP: No primary care provider on file. Unit/Bed#: 1575 [address] 202-01 Encounter: [id number]  Length of Stay: 3 days    Assessment/Plan  Norma Lancaster is a 60 y.o. female with a PMH of alc cirrhosis who presents with hematemesis, fth variceal bleed s/p 7 bands. # decompensated cirrhosis 2/2 alcohol  # hematemesis, variceal bleed  # melena, resolved  MELD 3.0: 8 at 11/4/2023  5:49 AM  MELD-Na: 6 at 11/4/2023  5:49 AM  Calculated from:  Serum Creatinine: 0.51 mg/dL (Using min of 1 mg/dL) at 11/4/2023  5:49 AM  Serum Sodium: 136 mmol/L at 11/4/2023  5:49 AM  Total Bilirubin: 0.61 mg/dL (Using min of 1 mg/dL) at 11/4/2023  5:49 AM  Serum Albumin: 3.4 g/dL at 11/4/2023  5:49 AM  INR(ratio): 1.03 at 11/3/2023  5:06 AM  Age at listing (hypothetical): 60 years  Sex: Female at 11/4/2023  5:49 AM   - EV screening status: last EGD 11/01 showing BE, 2 grade II varices s/p 7 bands, PHG, nl duo. Needs repeat EGD in about 4 weeks, along with NSBB   - Ascites: trace; can consider diuretics in outpatient setting   - SBP PPx: on CTX currently   - HE: none currently, should be on lactulose on discharge. - [address] screening: RUQ US 11/03/23 no masses, trend q6 mo   - 2g Na diet; protein and nutrition supplementation   - last Cr: 0.5   - alc abstinence, avoid NSAIDs   - advance diet   - okay to discharge from GI perspective. Will need coreg and lactulose on dc meds   - will arrange for f/u appt w/ hepatology    # Meade's esophagus   - c/w PPI bid    Subjective: Interval History:   NAEO. Voiding more, no additional melena. BMs remain soft but more formed, brown/green in color. No complaints currently besides being hungry. ROS otherwise as covered in Interval History.     Objective:  /61 (BP Location: Right arm)   Pulse 84   Temp 98.3 °F (36.8 °C) (Oral)   Resp 16   Ht 5' 7" (1.702 m)   Wt 73.3 kg (161 lb 9.6 oz) SpO2 95%   BMI 25.31 kg/m²     Intake/Output Summary (Last 24 hours) at 11/4/2023 0945  Last data filed at 11/4/2023 0845  Gross per 24 hour   Intake 1800 ml   Output --   Net 1800 ml     Physical Exam  Constitutional:       General: She is not in acute distress. Appearance: Normal appearance. HENT:      Head: Normocephalic and atraumatic. Nose: Nose normal.      Mouth/Throat:      Mouth: Mucous membranes are moist.   Eyes:      General: No scleral icterus. Extraocular Movements: Extraocular movements intact. Conjunctiva/sclera: Conjunctivae normal.      Pupils: Pupils are equal, round, and reactive to light. Cardiovascular:      Rate and Rhythm: Normal rate and regular rhythm. Pulmonary:      Effort: Pulmonary effort is normal.   Abdominal:      General: Abdomen is flat. There is no distension. Palpations: There is no mass. Tenderness: There is no abdominal tenderness. Musculoskeletal:         General: No swelling. Normal range of motion. Skin:     General: Skin is warm and dry. Capillary Refill: Capillary refill takes less than 2 seconds. Neurological:      General: No focal deficit present. Mental Status: She is alert and oriented to person, place, and time. Comments: No asterixis   Psychiatric:         Mood and Affect: Mood normal.         Labs: I have reviewed all available labs and imaging results in Epic.   Results from last 7 days   Lab Units 11/04/23  0549 11/03/23  0506 11/02/23  0440   SODIUM mmol/L 136 139 137   POTASSIUM mmol/L 3.6 3.7 3.6   CHLORIDE mmol/L 108 110* 108   CO2 mmol/L 23 24 25   BUN mg/dL 12 9 18   CREATININE mg/dL 0.51* 0.42* 0.47*   GLUCOSE RANDOM mg/dL 112 123 126   CALCIUM mg/dL 8.5 8.0* 8.0*   ALBUMIN g/dL 3.4* 3.1* 3.1*   ALK PHOS U/L 70 63 68   ALT U/L 17 20 23   AST U/L 20 26 32   EGFR ml/min/1.73sq m 105 112 108     Results from last 7 days   Lab Units 11/04/23  0549 11/03/23  0506 11/02/23  1316 11/02/23  0440 11/01/23  0634 11/01/23  0550   WBC Thousand/uL 5.46 5.16  --  5.75  --  12.20*   HEMOGLOBIN g/dL 8.2* 7.5* 9.1* 7.6*   < > 5.7*   HEMATOCRIT % 26.2* 23.3*  --  24.2*   < > 20.1*   PLATELETS Thousands/uL 204 173  --  165  --  306   NEUTROS PCT %  --   --   --  59  --  73   LYMPHS PCT %  --   --   --  23  --  13*   MONOS PCT %  --   --   --  15*  --  10   EOS PCT %  --   --   --  2  --  1   BASOS PCT %  --   --   --  1  --  1   NEUTROS ABS Thousands/µL  --   --   --  3.45  --  9.08*   LYMPHS ABS Thousands/µL  --   --   --  1.31  --  1.63   MONOS ABS Thousand/µL  --   --   --  0.84  --  1.16   EOS ABS Thousand/µL  --   --   --  0.10  --  0.06   BASOS ABS Thousands/µL  --   --   --  0.03  --  0.09    < > = values in this interval not displayed. Results from last 7 days   Lab Units 11/03/23  0506 11/01/23  0550   PROTIME seconds 13.7 14.8*   INR  1.03 1.14   PTT seconds  --  25           Additional Labs:  Results from last 7 days   Lab Units 11/03/23  0506 11/02/23  0440   MAGNESIUM mg/dL 1.9 1.8*   PHOSPHORUS mg/dL 3.4  --           Results from last 7 days   Lab Units 11/03/23  0506   TSH 3RD GENERATON uIU/mL 0.395*       Imaging:  US liver doppler only   Final Result by Torito Shrestha MD (11/02 1521)      Normal Doppler ultrasound evaluation of hepatic vasculature. Trace upper abdominal ascites.       Workstation performed: [id number]             Medications:   Current Facility-Administered Medications   Medication Dose Route Frequency Provider Last Rate    ascorbic acid  500 mg Oral Daily KARLEY Madrigal      atorvastatin  40 mg Oral Daily With KARLEY Man      cefTRIAXone  1,000 mg Intravenous Q24H Aggie Cornea, DO 1,000 mg (51/86/18 8069)    folic acid  1 mg Oral Daily Lalo Koehler       haloperidol  10 mg Oral HS Lalo Koehler, DO      nicotine polacrilex  2 mg Oral Q2H PRN Aggie Burgess,       octreotide  50 mcg/hr Intravenous Continuous ALBA MadrigalNP 50 mcg/hr (11/03/23 3103)    ondansetron  4 mg Intravenous Q8H PRN Arik Hernandez, DO      pantoprazole  40 mg Oral BID AC Jovita Wallace MD      senna-docusate sodium  1 tablet Oral BID Arik Hernandez, DO      thiamine  100 mg Oral Daily Arik Hernandez, DO         Problem List:  Patient Active Problem List   Diagnosis    Bipolar disorder ([address])    Alcohol intoxication ([address] [facility])    Alcohol abuse    Suicidal ideations    Homicidal ideation    Tobacco abuse    GI bleed    Symptomatic anemia    Hypokalemia    Hyponatremia    Alcoholic cirrhosis of liver without ascites (HCC)    Hematemesis    Acute blood loss anemia    Hypothyroidism    Constipation    Hyperlipidemia       Case discussed with attending physician Dr. Stephen Davis. All recs are preliminary pending final attestation.     Heri López, PGY-4

## 2023-11-04 NOTE — PLAN OF CARE
Problem: Potential for Falls  Goal: Patient will remain free of falls  Description: INTERVENTIONS:  - Educate patient/family on patient safety including physical limitations  - Instruct patient to call for assistance with activity   - Consult OT/PT to assist with strengthening/mobility   - Keep Call bell within reach  - Keep bed low and locked with side rails adjusted as appropriate  - Keep care items and personal belongings within reach  - Initiate and maintain comfort rounds  - Make Fall Risk Sign visible to staff  - Offer Toileting every 2 Hours, in advance of need  - Initiate/Maintain bed alarm  - Obtain necessary fall risk management equipment:   - Apply yellow socks and bracelet for high fall risk patients  - Consider moving patient to room near nurses station  11/4/2023 1415 by Karen Cuellar RN  Outcome: Adequate for Discharge  11/4/2023 0849 by Karen Cuellar RN  Outcome: Progressing     Problem: PAIN - ADULT  Goal: Verbalizes/displays adequate comfort level or baseline comfort level  Description: Interventions:  - Encourage patient to monitor pain and request assistance  - Assess pain using appropriate pain scale  - Administer analgesics based on type and severity of pain and evaluate response  - Implement non-pharmacological measures as appropriate and evaluate response  - Consider cultural and social influences on pain and pain management  - Notify physician/advanced practitioner if interventions unsuccessful or patient reports new pain  11/4/2023 1415 by Karen Cuellar RN  Outcome: Adequate for Discharge  11/4/2023 0849 by Karen Cuellar RN  Outcome: Progressing     Problem: INFECTION - ADULT  Goal: Absence or prevention of progression during hospitalization  Description: INTERVENTIONS:  - Assess and monitor for signs and symptoms of infection  - Monitor lab/diagnostic results  - Monitor all insertion sites, i.e. indwelling lines, tubes, and drains  - Monitor endotracheal if appropriate and nasal secretions for changes in amount and color  - [place] appropriate cooling/warming therapies per order  - Administer medications as ordered  - Instruct and encourage patient and family to use good hand hygiene technique  - Identify and instruct in appropriate isolation precautions for identified infection/condition  11/4/2023 1415 by Jonah Schwarz RN  Outcome: Adequate for Discharge  11/4/2023 0849 by Jonah Schwarz RN  Outcome: Progressing     Problem: SAFETY ADULT  Goal: Patient will remain free of falls  Description: INTERVENTIONS:  - Educate patient/family on patient safety including physical limitations  - Instruct patient to call for assistance with activity   - Consult OT/PT to assist with strengthening/mobility   - Keep Call bell within reach  - Keep bed low and locked with side rails adjusted as appropriate  - Keep care items and personal belongings within reach  - Initiate and maintain comfort rounds  - Make Fall Risk Sign visible to staff  - Offer Toileting every 2 Hours, in advance of need  - Initiate/Maintain bed alarm  - Obtain necessary fall risk management equipment:   - Apply yellow socks and bracelet for high fall risk patients  - Consider moving patient to room near nurses station  11/4/2023 1415 by Jonah Schwarz RN  Outcome: Adequate for Discharge  11/4/2023 0849 by Jonah Schwarz RN  Outcome: Progressing  Goal: Maintain or return to baseline ADL function  Description: INTERVENTIONS:  -  Assess patient's ability to carry out ADLs; assess patient's baseline for ADL function and identify physical deficits which impact ability to perform ADLs (bathing, care of mouth/teeth, toileting, grooming, dressing, etc.)  - Assess/evaluate cause of self-care deficits   - Assess range of motion  - Assess patient's mobility; develop plan if impaired  - Assess patient's need for assistive devices and provide as appropriate  - Encourage maximum independence but intervene and supervise when necessary  - Involve family in performance of ADLs  - Assess for home care needs following discharge   - Consider OT consult to assist with ADL evaluation and planning for discharge  - Provide patient education as appropriate  11/4/2023 1415 by Karen Cuellar RN  Outcome: Adequate for Discharge  11/4/2023 0849 by Karen Cuellar RN  Outcome: Progressing  Goal: Maintains/Returns to pre admission functional level  Description: INTERVENTIONS:  - Perform BMAT or MOVE assessment daily.   - Set and communicate daily mobility goal to care team and patient/family/caregiver. - Collaborate with rehabilitation services on mobility goals if consulted  - Perform Range of Motion 4 times a day. - Reposition patient every 2 hours.   - Dangle patient 3 times a day  - Stand patient 3 times a day  - Ambulate patient 3 times a day  - Out of bed to chair 3 times a day   - Out of bed for meals 3 times a day  - Out of bed for toileting  - Record patient progress and toleration of activity level   11/4/2023 1415 by Karen Cuellar RN  Outcome: Adequate for Discharge  11/4/2023 0849 by Karen Cuellar RN  Outcome: Progressing     Problem: DISCHARGE PLANNING  Goal: Discharge to home or other facility with appropriate resources  Description: INTERVENTIONS:  - Identify barriers to discharge w/patient and caregiver  - Arrange for needed discharge resources and transportation as appropriate  - Identify discharge learning needs (meds, wound care, etc.)  - Arrange for interpretive services to assist at discharge as needed  - Refer to Case Management Department for coordinating discharge planning if the patient needs post-hospital services based on physician/advanced practitioner order or complex needs related to functional status, cognitive ability, or social support system  11/4/2023 1415 by Karen Cuellar RN  Outcome: Adequate for Discharge  11/4/2023 0849 by Karen Cuellar RN  Outcome: Progressing     Problem: Knowledge Deficit  Goal: Patient/family/caregiver demonstrates understanding of disease process, treatment plan, medications, and discharge instructions  Description: Complete learning assessment and assess knowledge base. Interventions:  - Provide teaching at level of understanding  - Provide teaching via preferred learning methods  11/4/2023 1415 by Abbie Victoria RN  Outcome: Adequate for Discharge  11/4/2023 0849 by Abbie Victoria RN  Outcome: Progressing     Problem: Nutrition/Hydration-ADULT  Goal: Nutrient/Hydration intake appropriate for improving, restoring or maintaining nutritional needs  Description: Monitor and assess patient's nutrition/hydration status for malnutrition. Collaborate with interdisciplinary team and initiate plan and interventions as ordered. Monitor patient's weight and dietary intake as ordered or per policy. Utilize nutrition screening tool and intervene as necessary. Determine patient's food preferences and provide high-protein, high-caloric foods as appropriate.      INTERVENTIONS:  - Monitor oral intake, urinary output, labs, and treatment plans  - Assess nutrition and hydration status and recommend course of action  - Evaluate amount of meals eaten  - Assist patient with eating if necessary   - Allow adequate time for meals  - Recommend/ encourage appropriate diets, oral nutritional supplements, and vitamin/mineral supplements  - Order, calculate, and assess calorie counts as needed  - Recommend, monitor, and adjust tube feedings and TPN/PPN based on assessed needs  - Assess need for intravenous fluids  - Provide specific nutrition/hydration education as appropriate  - Include patient/family/caregiver in decisions related to nutrition  11/4/2023 1415 by Abbie Victoria RN  Outcome: Adequate for Discharge  11/4/2023 0849 by Abbie Victoria RN  Outcome: Progressing     Problem: Prexisting or High Potential for Compromised Skin Integrity  Goal: Skin integrity is maintained or improved  Description: INTERVENTIONS:  - Identify patients at risk for skin breakdown  - Assess and monitor skin integrity  - Assess and monitor nutrition and hydration status  - Monitor labs   - Assess for incontinence   - Turn and reposition patient  - Assist with mobility/ambulation  - Relieve pressure over bony prominences  - Avoid friction and shearing  - Provide appropriate hygiene as needed including keeping skin clean and dry  - Evaluate need for skin moisturizer/barrier cream  - Collaborate with interdisciplinary team   - Patient/family teaching  - Consider wound care consult   11/4/2023 1415 by Danie Araujo RN  Outcome: Adequate for Discharge  11/4/2023 0849 by Danie Araujo RN  Outcome: Progressing     Problem: MOBILITY - ADULT  Goal: Maintain or return to baseline ADL function  Description: INTERVENTIONS:  -  Assess patient's ability to carry out ADLs; assess patient's baseline for ADL function and identify physical deficits which impact ability to perform ADLs (bathing, care of mouth/teeth, toileting, grooming, dressing, etc.)  - Assess/evaluate cause of self-care deficits   - Assess range of motion  - Assess patient's mobility; develop plan if impaired  - Assess patient's need for assistive devices and provide as appropriate  - Encourage maximum independence but intervene and supervise when necessary  - Involve family in performance of ADLs  - Assess for home care needs following discharge   - Consider OT consult to assist with ADL evaluation and planning for discharge  - Provide patient education as appropriate  11/4/2023 1415 by Danie Araujo RN  Outcome: Adequate for Discharge  11/4/2023 0849 by Danie Araujo RN  Outcome: Progressing  Goal: Maintains/Returns to pre admission functional level  Description: INTERVENTIONS:  - Perform BMAT or MOVE assessment daily.   - Set and communicate daily mobility goal to care team and patient/family/caregiver. - Collaborate with rehabilitation services on mobility goals if consulted  - Perform Range of Motion 4 times a day.   - Reposition patient every 2 hours.   - Dangle patient 3 times a day  - Stand patient 3 times a day  - Ambulate patient 3 times a day  - Out of bed to chair 3 times a day   - Out of bed for meals 3 times a day  - Out of bed for toileting  - Record patient progress and toleration of activity level   11/4/2023 1415 by Sergio Marks RN  Outcome: Adequate for Discharge  11/4/2023 0849 by Sergio Marks RN  Outcome: Progressing

## 2023-11-04 NOTE — ASSESSMENT & PLAN NOTE
History of alcoholic cirrhosis hypothyroidism and mood disorder presents to the hospital after being found with decreased consciousness in a motel  Found to have profound anemia 4.9 secondary to upper GI bleeding  Admitted to ICU and had EGD with variceal banding. Status post 3 unit PRBC. Was on IV ceftriaxone octreotide and PPI. Discharge: Hemoglobin stable. GI recommending carvedilol, lactulose.   Will also send in prescription for PPI thiamine and folic acid    Results from last 7 days   Lab Units 11/04/23  0549 11/03/23  0506 11/02/23  1316 11/02/23  0440   HEMOGLOBIN g/dL 8.2* 7.5* 9.1* 7.6*

## 2023-11-04 NOTE — ASSESSMENT & PLAN NOTE
Alcoholic cirrhosis of liver should follow-up with hepatology after discharge  Information placed on after visit summary for close follow-up    Results from last 7 days   Lab Units 11/04/23  0549 11/03/23  0506 11/02/23  0440 11/01/23  0550   TOTAL BILIRUBIN mg/dL 0.61 0.65 0.82 0.50

## 2023-11-04 NOTE — NURSING NOTE
Patient discharged 11/4/2023 2:16 PM. AVS and discharge paperwork reviewed with patient. All questions answered. Patient verbalized having all belongings for discharge. Patient picked up by personal cab to be taken to hotel.     Kezia Leo 11/4/2023 2:17 PM

## 2023-11-04 NOTE — PLAN OF CARE
Problem: Potential for Falls  Goal: Patient will remain free of falls  Description: INTERVENTIONS:  - Educate patient/family on patient safety including physical limitations  - Instruct patient to call for assistance with activity   - Consult OT/PT to assist with strengthening/mobility   - Keep Call bell within reach  - Keep bed low and locked with side rails adjusted as appropriate  - Keep care items and personal belongings within reach  - Initiate and maintain comfort rounds  - Make Fall Risk Sign visible to staff  - Offer Toileting every 2 Hours, in advance of need  - Initiate/Maintain bed alarm  - Obtain necessary fall risk management equipment:   - Apply yellow socks and bracelet for high fall risk patients  - Consider moving patient to room near nurses station  Outcome: Progressing     Problem: PAIN - ADULT  Goal: Verbalizes/displays adequate comfort level or baseline comfort level  Description: Interventions:  - Encourage patient to monitor pain and request assistance  - Assess pain using appropriate pain scale  - Administer analgesics based on type and severity of pain and evaluate response  - Implement non-pharmacological measures as appropriate and evaluate response  - Consider cultural and social influences on pain and pain management  - Notify physician/advanced practitioner if interventions unsuccessful or patient reports new pain  Outcome: Progressing     Problem: INFECTION - ADULT  Goal: Absence or prevention of progression during hospitalization  Description: INTERVENTIONS:  - Assess and monitor for signs and symptoms of infection  - Monitor lab/diagnostic results  - Monitor all insertion sites, i.e. indwelling lines, tubes, and drains  - Monitor endotracheal if appropriate and nasal secretions for changes in amount and color  - [place] appropriate cooling/warming therapies per order  - Administer medications as ordered  - Instruct and encourage patient and family to use good hand hygiene technique  - Identify and instruct in appropriate isolation precautions for identified infection/condition  Outcome: Progressing     Problem: SAFETY ADULT  Goal: Patient will remain free of falls  Description: INTERVENTIONS:  - Educate patient/family on patient safety including physical limitations  - Instruct patient to call for assistance with activity   - Consult OT/PT to assist with strengthening/mobility   - Keep Call bell within reach  - Keep bed low and locked with side rails adjusted as appropriate  - Keep care items and personal belongings within reach  - Initiate and maintain comfort rounds  - Make Fall Risk Sign visible to staff  - Offer Toileting every 2 Hours, in advance of need  - Initiate/Maintain bed alarm  - Obtain necessary fall risk management equipment:   - Apply yellow socks and bracelet for high fall risk patients  - Consider moving patient to room near nurses station  Outcome: Progressing  Goal: Maintain or return to baseline ADL function  Description: INTERVENTIONS:  -  Assess patient's ability to carry out ADLs; assess patient's baseline for ADL function and identify physical deficits which impact ability to perform ADLs (bathing, care of mouth/teeth, toileting, grooming, dressing, etc.)  - Assess/evaluate cause of self-care deficits   - Assess range of motion  - Assess patient's mobility; develop plan if impaired  - Assess patient's need for assistive devices and provide as appropriate  - Encourage maximum independence but intervene and supervise when necessary  - Involve family in performance of ADLs  - Assess for home care needs following discharge   - Consider OT consult to assist with ADL evaluation and planning for discharge  - Provide patient education as appropriate  Outcome: Progressing  Goal: Maintains/Returns to pre admission functional level  Description: INTERVENTIONS:  - Perform BMAT or MOVE assessment daily.   - Set and communicate daily mobility goal to care team and patient/family/caregiver. - Collaborate with rehabilitation services on mobility goals if consulted  - Perform Range of Motion 4 times a day. - Reposition patient every 2 hours. - Dangle patient 3 times a day  - Stand patient 3 times a day  - Ambulate patient 3 times a day  - Out of bed to chair 3 times a day   - Out of bed for meals 3 times a day  - Out of bed for toileting  - Record patient progress and toleration of activity level   Outcome: Progressing     Problem: DISCHARGE PLANNING  Goal: Discharge to home or other facility with appropriate resources  Description: INTERVENTIONS:  - Identify barriers to discharge w/patient and caregiver  - Arrange for needed discharge resources and transportation as appropriate  - Identify discharge learning needs (meds, wound care, etc.)  - Arrange for interpretive services to assist at discharge as needed  - Refer to Case Management Department for coordinating discharge planning if the patient needs post-hospital services based on physician/advanced practitioner order or complex needs related to functional status, cognitive ability, or social support system  Outcome: Progressing     Problem: Knowledge Deficit  Goal: Patient/family/caregiver demonstrates understanding of disease process, treatment plan, medications, and discharge instructions  Description: Complete learning assessment and assess knowledge base. Interventions:  - Provide teaching at level of understanding  - Provide teaching via preferred learning methods  Outcome: Progressing     Problem: Nutrition/Hydration-ADULT  Goal: Nutrient/Hydration intake appropriate for improving, restoring or maintaining nutritional needs  Description: Monitor and assess patient's nutrition/hydration status for malnutrition. Collaborate with interdisciplinary team and initiate plan and interventions as ordered. Monitor patient's weight and dietary intake as ordered or per policy.  Utilize nutrition screening tool and intervene as necessary. Determine patient's food preferences and provide high-protein, high-caloric foods as appropriate.      INTERVENTIONS:  - Monitor oral intake, urinary output, labs, and treatment plans  - Assess nutrition and hydration status and recommend course of action  - Evaluate amount of meals eaten  - Assist patient with eating if necessary   - Allow adequate time for meals  - Recommend/ encourage appropriate diets, oral nutritional supplements, and vitamin/mineral supplements  - Order, calculate, and assess calorie counts as needed  - Recommend, monitor, and adjust tube feedings and TPN/PPN based on assessed needs  - Assess need for intravenous fluids  - Provide specific nutrition/hydration education as appropriate  - Include patient/family/caregiver in decisions related to nutrition  Outcome: Progressing     Problem: Prexisting or High Potential for Compromised Skin Integrity  Goal: Skin integrity is maintained or improved  Description: INTERVENTIONS:  - Identify patients at risk for skin breakdown  - Assess and monitor skin integrity  - Assess and monitor nutrition and hydration status  - Monitor labs   - Assess for incontinence   - Turn and reposition patient  - Assist with mobility/ambulation  - Relieve pressure over bony prominences  - Avoid friction and shearing  - Provide appropriate hygiene as needed including keeping skin clean and dry  - Evaluate need for skin moisturizer/barrier cream  - Collaborate with interdisciplinary team   - Patient/family teaching  - Consider wound care consult   Outcome: Progressing     Problem: MOBILITY - ADULT  Goal: Maintain or return to baseline ADL function  Description: INTERVENTIONS:  -  Assess patient's ability to carry out ADLs; assess patient's baseline for ADL function and identify physical deficits which impact ability to perform ADLs (bathing, care of mouth/teeth, toileting, grooming, dressing, etc.)  - Assess/evaluate cause of self-care deficits   - Assess range of motion  - Assess patient's mobility; develop plan if impaired  - Assess patient's need for assistive devices and provide as appropriate  - Encourage maximum independence but intervene and supervise when necessary  - Involve family in performance of ADLs  - Assess for home care needs following discharge   - Consider OT consult to assist with ADL evaluation and planning for discharge  - Provide patient education as appropriate  Outcome: Progressing  Goal: Maintains/Returns to pre admission functional level  Description: INTERVENTIONS:  - Perform BMAT or MOVE assessment daily.   - Set and communicate daily mobility goal to care team and patient/family/caregiver. - Collaborate with rehabilitation services on mobility goals if consulted  - Perform Range of Motion 4 times a day. - Reposition patient every 2 hours.   - Dangle patient 3 times a day  - Stand patient 3 times a day  - Ambulate patient 3 times a day  - Out of bed to chair 3 times a day   - Out of bed for meals 3 times a day  - Out of bed for toileting  - Record patient progress and toleration of activity level   Outcome: Progressing

## 2023-11-04 NOTE — PLAN OF CARE
Problem: Potential for Falls  Goal: Patient will remain free of falls  Description: INTERVENTIONS:  - Educate patient/family on patient safety including physical limitations  - Instruct patient to call for assistance with activity   - Consult OT/PT to assist with strengthening/mobility   - Keep Call bell within reach  - Keep bed low and locked with side rails adjusted as appropriate  - Keep care items and personal belongings within reach  - Initiate and maintain comfort rounds  - Make Fall Risk Sign visible to staff  - Offer Toileting every 2 Hours, in advance of need  - Initiate/Maintain bed alarm  - Obtain necessary fall risk management equipment:   - Apply yellow socks and bracelet for high fall risk patients  - Consider moving patient to room near nurses station  Outcome: Progressing     Problem: PAIN - ADULT  Goal: Verbalizes/displays adequate comfort level or baseline comfort level  Description: Interventions:  - Encourage patient to monitor pain and request assistance  - Assess pain using appropriate pain scale  - Administer analgesics based on type and severity of pain and evaluate response  - Implement non-pharmacological measures as appropriate and evaluate response  - Consider cultural and social influences on pain and pain management  - Notify physician/advanced practitioner if interventions unsuccessful or patient reports new pain  Outcome: Progressing     Problem: INFECTION - ADULT  Goal: Absence or prevention of progression during hospitalization  Description: INTERVENTIONS:  - Assess and monitor for signs and symptoms of infection  - Monitor lab/diagnostic results  - Monitor all insertion sites, i.e. indwelling lines, tubes, and drains  - Monitor endotracheal if appropriate and nasal secretions for changes in amount and color  - [place] appropriate cooling/warming therapies per order  - Administer medications as ordered  - Instruct and encourage patient and family to use good hand hygiene technique  - Identify and instruct in appropriate isolation precautions for identified infection/condition  Outcome: Progressing     Problem: SAFETY ADULT  Goal: Patient will remain free of falls  Description: INTERVENTIONS:  - Educate patient/family on patient safety including physical limitations  - Instruct patient to call for assistance with activity   - Consult OT/PT to assist with strengthening/mobility   - Keep Call bell within reach  - Keep bed low and locked with side rails adjusted as appropriate  - Keep care items and personal belongings within reach  - Initiate and maintain comfort rounds  - Make Fall Risk Sign visible to staff  - Offer Toileting every 2 Hours, in advance of need  - Initiate/Maintain bed alarm  - Obtain necessary fall risk management equipment:   - Apply yellow socks and bracelet for high fall risk patients  - Consider moving patient to room near nurses station  Outcome: Progressing  Goal: Maintain or return to baseline ADL function  Description: INTERVENTIONS:  -  Assess patient's ability to carry out ADLs; assess patient's baseline for ADL function and identify physical deficits which impact ability to perform ADLs (bathing, care of mouth/teeth, toileting, grooming, dressing, etc.)  - Assess/evaluate cause of self-care deficits   - Assess range of motion  - Assess patient's mobility; develop plan if impaired  - Assess patient's need for assistive devices and provide as appropriate  - Encourage maximum independence but intervene and supervise when necessary  - Involve family in performance of ADLs  - Assess for home care needs following discharge   - Consider OT consult to assist with ADL evaluation and planning for discharge  - Provide patient education as appropriate  Outcome: Progressing  Goal: Maintains/Returns to pre admission functional level  Description: INTERVENTIONS:  - Perform BMAT or MOVE assessment daily.   - Set and communicate daily mobility goal to care team and patient/family/caregiver. - Collaborate with rehabilitation services on mobility goals if consulted  - Perform Range of Motion 4 times a day. - Reposition patient every 2 hours. - Dangle patient 3 times a day  - Stand patient 3 times a day  - Ambulate patient 3 times a day  - Out of bed to chair 3 times a day   - Out of bed for meals 3 times a day  - Out of bed for toileting  - Record patient progress and toleration of activity level   Outcome: Progressing     Problem: DISCHARGE PLANNING  Goal: Discharge to home or other facility with appropriate resources  Description: INTERVENTIONS:  - Identify barriers to discharge w/patient and caregiver  - Arrange for needed discharge resources and transportation as appropriate  - Identify discharge learning needs (meds, wound care, etc.)  - Arrange for interpretive services to assist at discharge as needed  - Refer to Case Management Department for coordinating discharge planning if the patient needs post-hospital services based on physician/advanced practitioner order or complex needs related to functional status, cognitive ability, or social support system  Outcome: Progressing     Problem: Knowledge Deficit  Goal: Patient/family/caregiver demonstrates understanding of disease process, treatment plan, medications, and discharge instructions  Description: Complete learning assessment and assess knowledge base. Interventions:  - Provide teaching at level of understanding  - Provide teaching via preferred learning methods  Outcome: Progressing     Problem: Nutrition/Hydration-ADULT  Goal: Nutrient/Hydration intake appropriate for improving, restoring or maintaining nutritional needs  Description: Monitor and assess patient's nutrition/hydration status for malnutrition. Collaborate with interdisciplinary team and initiate plan and interventions as ordered. Monitor patient's weight and dietary intake as ordered or per policy.  Utilize nutrition screening tool and intervene as necessary. Determine patient's food preferences and provide high-protein, high-caloric foods as appropriate.      INTERVENTIONS:  - Monitor oral intake, urinary output, labs, and treatment plans  - Assess nutrition and hydration status and recommend course of action  - Evaluate amount of meals eaten  - Assist patient with eating if necessary   - Allow adequate time for meals  - Recommend/ encourage appropriate diets, oral nutritional supplements, and vitamin/mineral supplements  - Order, calculate, and assess calorie counts as needed  - Recommend, monitor, and adjust tube feedings and TPN/PPN based on assessed needs  - Assess need for intravenous fluids  - Provide specific nutrition/hydration education as appropriate  - Include patient/family/caregiver in decisions related to nutrition  Outcome: Progressing     Problem: Prexisting or High Potential for Compromised Skin Integrity  Goal: Skin integrity is maintained or improved  Description: INTERVENTIONS:  - Identify patients at risk for skin breakdown  - Assess and monitor skin integrity  - Assess and monitor nutrition and hydration status  - Monitor labs   - Assess for incontinence   - Turn and reposition patient  - Assist with mobility/ambulation  - Relieve pressure over bony prominences  - Avoid friction and shearing  - Provide appropriate hygiene as needed including keeping skin clean and dry  - Evaluate need for skin moisturizer/barrier cream  - Collaborate with interdisciplinary team   - Patient/family teaching  - Consider wound care consult   Outcome: Progressing     Problem: MOBILITY - ADULT  Goal: Maintain or return to baseline ADL function  Description: INTERVENTIONS:  -  Assess patient's ability to carry out ADLs; assess patient's baseline for ADL function and identify physical deficits which impact ability to perform ADLs (bathing, care of mouth/teeth, toileting, grooming, dressing, etc.)  - Assess/evaluate cause of self-care deficits   - Assess range of motion  - Assess patient's mobility; develop plan if impaired  - Assess patient's need for assistive devices and provide as appropriate  - Encourage maximum independence but intervene and supervise when necessary  - Involve family in performance of ADLs  - Assess for home care needs following discharge   - Consider OT consult to assist with ADL evaluation and planning for discharge  - Provide patient education as appropriate  Outcome: Progressing  Goal: Maintains/Returns to pre admission functional level  Description: INTERVENTIONS:  - Perform BMAT or MOVE assessment daily.   - Set and communicate daily mobility goal to care team and patient/family/caregiver. - Collaborate with rehabilitation services on mobility goals if consulted  - Perform Range of Motion 4 times a day. - Reposition patient every 2 hours.   - Dangle patient 3 times a day  - Stand patient 3 times a day  - Ambulate patient 3 times a day  - Out of bed to chair 3 times a day   - Out of bed for meals 3 times a day  - Out of bed for toileting  - Record patient progress and toleration of activity level   Outcome: Progressing

## 2023-11-04 NOTE — DISCHARGE SUMMARY
[address] [facility]  Discharge- Óscar Filler 1965, 60 y.o. female MRN: [id number]  Unit/Bed#: 1575 [facility] [address] [place] [address] Gerald [id number] Encounter: [id number]  Primary Care Provider: No primary care provider on file. Date and time admitted to hospital: 11/1/2023  4:57 AM    Admitting Provider:  Calvin Herrera MD  Discharge Provider:  Tucker Ponce DO  Admission Date: 11/1/2023       Discharge Date: 11/04/23   LOS: 3  Primary Care Physician at Discharge: Patient plans on following with Dr. Fei Florian  * Acute blood loss anemia  Assessment & Plan  History of alcoholic cirrhosis hypothyroidism and mood disorder presents to the hospital after being found with decreased consciousness in a motel  Found to have profound anemia 4.9 secondary to upper GI bleeding  Admitted to ICU and had EGD with variceal banding. Status post 3 unit PRBC. Was on IV ceftriaxone octreotide and PPI. Discharge: Hemoglobin stable. GI recommending carvedilol, lactulose. Will also send in prescription for PPI thiamine and folic acid    Results from last 7 days   Lab Units 11/04/23  0549 11/03/23  0506 11/02/23  1316 11/02/23  0440   HEMOGLOBIN g/dL 8.2* 7.5* 9.1* 7.6*         Hyperlipidemia  Assessment & Plan  Continue atorvastatin    Constipation  Assessment & Plan  Was on senna/docusate during hospitalization    Hypothyroidism  Assessment & Plan  Patient reports no longer on levothyroxine. TSH this admission okay    Alcoholic cirrhosis of liver without ascites (HCC)  Assessment & Plan  Alcoholic cirrhosis of liver should follow-up with hepatology after discharge  Information placed on after visit summary for close follow-up    Results from last 7 days   Lab Units 11/04/23  0549 11/03/23  0506 11/02/23  0440 11/01/23  0550   TOTAL BILIRUBIN mg/dL 0.61 0.65 0.82 0.50         Alcohol abuse  Assessment & Plan  Continue thiamine and folic acid.     Bipolar disorder [facility])  Assessment & Plan  Mood stable recently discharged from [address] [place] [address] psychiatry. Continue haloperidol. REASON FOR ADMISSION  Alcohol Intoxication (Pt BIBA from motel where she was found in bed intoxicated. Pt released from a behavioral facility in [place], per EMS after release drank 1/2 beer case. Report V/D. States she called ambulance because she did not want to be responsible for "the dirt in the motel". Pt uncooperative with triage questions. )    HOSPITAL COURSE:  Norma Lancaster is a 60 y.o. female with a history of alcoholic cirrhosis hypothyroidism and mood disorder who presented to the hospital found intoxicated in a motel bed. She was recently discharged from behavioral health in [place]. Reportedly she drank half a case of beer. She had hematemesis and was admitted to ICU after being found to have hemoglobin 4.9. She received 3 unit PRBC. She underwent EGD with esophageal variceal banding. Her hemoglobin has stabilized. Her octreotide and ceftriaxone has been discontinued she will be discharged home with medications as outlined above. Offered to call family, patient declined    Please see problem list listed above. CONSULTING PROVIDERS   CONSULT TO CERTIFIED   IP CONSULT TO CASE MANAGEMENT  IP CONSULT TO GASTROENTEROLOGY    PROCEDURES PERFORMED  EGD    Result Date: 11/1/2023  Impression: Meade's esophagus Two grade II varices (red giacomo sign); placed 7 bands successfully and 0 bands unsuccessfully The duodenum appeared normal. Mild portal gastropathy; no active bleeding encountered. Hi suspicion of varices as cause for upper gi bleedin RECOMMENDATION: See progress note. Repeat in 2 mos  Anju León MD     [address] liver doppler only    Result Date: 11/2/2023  Impression: Normal Doppler ultrasound evaluation of hepatic vasculature. Trace upper abdominal ascites.  Workstation performed: [id number]       LABS  Results from last 7 days   Lab Units 11/04/23  0549 11/03/23  0506 11/02/23  1316 11/02/23  0440 11/01/23  2338 11/01/23  1802 11/01/23  1323 11/01/23  0634 11/01/23  0550   WBC Thousand/uL 5.46 5.16  --  5.75  --   --   --   --  12.20*   HEMOGLOBIN g/dL 8.2* 7.5* 9.1* 7.6* 8.9* 7.3* 7.2* 4.9* 5.7*   HEMATOCRIT % 26.2* 23.3*  --  24.2*  --   --   --  17.3* 20.1*   MCV fL 97 95  --  97  --   --   --   --  112*   PLATELETS Thousands/uL 204 173  --  165  --   --   --   --  306   INR   --  1.03  --   --   --   --   --   --  1.14     Results from last 7 days   Lab Units 11/04/23  0549 11/03/23  0506 11/02/23  0440 11/01/23  0550   SODIUM mmol/L 136 139 137 137   POTASSIUM mmol/L 3.6 3.7 3.6 3.4*   CHLORIDE mmol/L 108 110* 108 103   CO2 mmol/L 23 24 25 14*   BUN mg/dL 12 9 18 36*   CREATININE mg/dL 0.51* 0.42* 0.47* 0.52*   CALCIUM mg/dL 8.5 8.0* 8.0* 8.7   ALBUMIN g/dL 3.4* 3.1* 3.1* 3.9   TOTAL BILIRUBIN mg/dL 0.61 0.65 0.82 0.50   ALK PHOS U/L 70 63 68 82   ALT U/L 17 20 23 24   AST U/L 20 26 32 32   EGFR ml/min/1.73sq m 105 112 108 104   GLUCOSE RANDOM mg/dL 112 123 126 92         Results from last 7 days   Lab Units 11/03/23  0506   TSH 3RD GENERATON uIU/mL 0.395*         DISCHARGE DAY VISIT AND PHYSICAL EXAM:  Subjective: Patient seen and examined. No complaints. Feels great    Vitals:   Blood Pressure: 100/61 (11/04/23 0703)  Pulse: 84 (11/04/23 0703)  Temperature: 98.3 °F (36.8 °C) (11/04/23 0703)  Temp Source: Oral (11/04/23 0703)  Respirations: 16 (11/04/23 0703)  Height: 5' 7" (170.2 cm) (11/01/23 1000)  Weight - Scale: 73.3 kg (161 lb 9.6 oz) (11/04/23 0600)  SpO2: 95 % (11/04/23 0703)    Physical Exam  Vitals reviewed. Constitutional:       General: She is not in acute distress. Appearance: Normal appearance. Eyes:      General: No scleral icterus. Cardiovascular:      Rate and Rhythm: Regular rhythm. Heart sounds: Normal heart sounds. Pulmonary:      Breath sounds: Normal breath sounds. No wheezing. Abdominal:      Tenderness: There is no guarding or rebound. Musculoskeletal:         General: No swelling. Skin:     General: Skin is warm. Neurological:      Mental Status: She is alert. Planned Re-admission: No  Discharge Disposition: Home/Self Care    Test Results Pending at Discharge:   Incidental findings:     Medications   Discharge Medication List: See after visit summary for reconciled discharge medications. Diet restrictions: Low-sodium diet    Activity restrictions: No strenuous activity  Discharge Condition: stable    Outpatient Follow-Up and Discharge Instructions  See after visit summary section titled Discharge Instructions for information provided to patient and family. Code Status: Level 2 - DNAR: but accepts endotracheal intubation  Discharge Statement   I spent 35 minutes discharging the patient. This time was spent on the day of discharge. Greater than 50% of total time was spent with the patient and / or family counseling and / or coordination of care. ** Please Note: This note has been constructed using a voice recognition system.  **

## 2023-11-06 ENCOUNTER — TELEPHONE (OUTPATIENT)
Dept: FAMILY MEDICINE CLINIC | Facility: CLINIC | Age: 58
End: 2023-11-06

## 2023-11-06 NOTE — TELEPHONE ENCOUNTER
----- Message from Ivette Trinh MD sent at 11/4/2023  9:06 PM EDT -----  Regarding: FW: TCM appointment needed  Set up w/ someone   ----- Message -----  From: Mela La RN  Sent: 11/3/2023   6:00 AM EDT  To: Ivette Trinh MD  Subject: TCM appointment needed                           Pt was a client of yours previously and wishes to resume care with you. Please contact pt to schedule an appointment.     Thank you  Shelli Chavez, RN CM [facility]

## 2023-12-14 ENCOUNTER — TELEPHONE (OUTPATIENT)
Dept: GASTROENTEROLOGY | Facility: CLINIC | Age: 58
End: 2023-12-14

## 2023-12-14 NOTE — TELEPHONE ENCOUNTER
Called pt, not able to reach, called pt's brother, Princess Cuellarjulio c to call and schedule ov with one of the hepatology providers. (Dx: decompensated cirrhosis-60 min)

## 2023-12-14 NOTE — TELEPHONE ENCOUNTER
----- Message from Tim Yoder MD sent at 11/4/2023  9:53 AM EDT -----  Regarding: f/u appt  Hey please schedule a f/u appointment for this lady for cirrhosis management.  Also please schedule her for a repeat EGD in late November / early december

## 2023-12-23 ENCOUNTER — HOSPITAL ENCOUNTER (EMERGENCY)
Facility: HOSPITAL | Age: 58
Discharge: HOME | End: 2023-12-25
Attending: EMERGENCY MEDICINE
Payer: MEDICARE

## 2023-12-23 DIAGNOSIS — Z86.59 HISTORY OF BIPOLAR DISORDER: ICD-10-CM

## 2023-12-23 DIAGNOSIS — Z86.59 HISTORY OF SCHIZOAFFECTIVE DISORDER: ICD-10-CM

## 2023-12-23 DIAGNOSIS — R45.851 SUICIDAL IDEATION: Primary | ICD-10-CM

## 2023-12-23 DIAGNOSIS — R45.850 HOMICIDAL IDEATION: ICD-10-CM

## 2023-12-23 DIAGNOSIS — F10.10 ALCOHOL ABUSE: ICD-10-CM

## 2023-12-23 LAB
ALBUMIN SERPL BCP-MCNC: 3.9 G/DL (ref 3.5–5)
ALP SERPL-CCNC: 92 U/L (ref 34–104)
ALT SERPL W P-5'-P-CCNC: 11 U/L (ref 7–52)
ANION GAP SERPL CALCULATED.3IONS-SCNC: 12 MMOL/L
AST SERPL W P-5'-P-CCNC: 33 U/L (ref 13–39)
BASOPHILS # BLD AUTO: 0.08 THOUSANDS/ÂΜL (ref 0–0.1)
BASOPHILS NFR BLD AUTO: 1 % (ref 0–1)
BILIRUB SERPL-MCNC: 0.41 MG/DL (ref 0.2–1)
BUN SERPL-MCNC: 7 MG/DL (ref 5–25)
CALCIUM SERPL-MCNC: 9.2 MG/DL (ref 8.4–10.2)
CHLORIDE SERPL-SCNC: 105 MMOL/L (ref 96–108)
CO2 SERPL-SCNC: 21 MMOL/L (ref 21–32)
CREAT SERPL-MCNC: 0.38 MG/DL (ref 0.6–1.3)
EOSINOPHIL # BLD AUTO: 0.08 THOUSAND/ÂΜL (ref 0–0.61)
EOSINOPHIL NFR BLD AUTO: 1 % (ref 0–6)
ERYTHROCYTE [DISTWIDTH] IN BLOOD BY AUTOMATED COUNT: 17.2 % (ref 11.6–15.1)
ETHANOL EXG-MCNC: 0.08 MG/DL
GFR SERPL CREATININE-BSD FRML MDRD: 116 ML/MIN/1.73SQ M
GLUCOSE SERPL-MCNC: 80 MG/DL (ref 65–140)
HCT VFR BLD AUTO: 33.7 % (ref 34.8–46.1)
HGB BLD-MCNC: 10.3 G/DL (ref 11.5–15.4)
IMM GRANULOCYTES # BLD AUTO: 0.02 THOUSAND/UL (ref 0–0.2)
IMM GRANULOCYTES NFR BLD AUTO: 0 % (ref 0–2)
LYMPHOCYTES # BLD AUTO: 1.61 THOUSANDS/ÂΜL (ref 0.6–4.47)
LYMPHOCYTES NFR BLD AUTO: 26 % (ref 14–44)
MCH RBC QN AUTO: 25.3 PG (ref 26.8–34.3)
MCHC RBC AUTO-ENTMCNC: 30.6 G/DL (ref 31.4–37.4)
MCV RBC AUTO: 83 FL (ref 82–98)
MONOCYTES # BLD AUTO: 0.62 THOUSAND/ÂΜL (ref 0.17–1.22)
MONOCYTES NFR BLD AUTO: 10 % (ref 4–12)
NEUTROPHILS # BLD AUTO: 3.83 THOUSANDS/ÂΜL (ref 1.85–7.62)
NEUTS SEG NFR BLD AUTO: 62 % (ref 43–75)
NRBC BLD AUTO-RTO: 0 /100 WBCS
PLATELET # BLD AUTO: 275 THOUSANDS/UL (ref 149–390)
PMV BLD AUTO: 10 FL (ref 8.9–12.7)
POTASSIUM SERPL-SCNC: 3.4 MMOL/L (ref 3.5–5.3)
PROT SERPL-MCNC: 7.9 G/DL (ref 6.4–8.4)
RBC # BLD AUTO: 4.07 MILLION/UL (ref 3.81–5.12)
SODIUM SERPL-SCNC: 138 MMOL/L (ref 135–147)
TSH SERPL DL<=0.05 MIU/L-ACNC: 1.45 UIU/ML (ref 0.45–4.5)
WBC # BLD AUTO: 6.24 THOUSAND/UL (ref 4.31–10.16)

## 2023-12-23 PROCEDURE — 84443 ASSAY THYROID STIM HORMONE: CPT | Performed by: EMERGENCY MEDICINE

## 2023-12-23 PROCEDURE — 99285 EMERGENCY DEPT VISIT HI MDM: CPT

## 2023-12-23 PROCEDURE — 82075 ASSAY OF BREATH ETHANOL: CPT

## 2023-12-23 PROCEDURE — 93005 ELECTROCARDIOGRAM TRACING: CPT

## 2023-12-23 PROCEDURE — 80053 COMPREHEN METABOLIC PANEL: CPT

## 2023-12-23 PROCEDURE — 85025 COMPLETE CBC W/AUTO DIFF WBC: CPT

## 2023-12-23 PROCEDURE — 36415 COLL VENOUS BLD VENIPUNCTURE: CPT

## 2023-12-23 PROCEDURE — 99285 EMERGENCY DEPT VISIT HI MDM: CPT | Performed by: EMERGENCY MEDICINE

## 2023-12-23 RX ORDER — FOLIC ACID 1 MG/1
1 TABLET ORAL DAILY
Status: DISCONTINUED | OUTPATIENT
Start: 2023-12-24 | End: 2023-12-25 | Stop reason: HOSPADM

## 2023-12-23 RX ORDER — PANTOPRAZOLE SODIUM 40 MG/1
40 TABLET, DELAYED RELEASE ORAL
Status: DISCONTINUED | OUTPATIENT
Start: 2023-12-24 | End: 2023-12-25 | Stop reason: HOSPADM

## 2023-12-23 RX ORDER — LANOLIN ALCOHOL/MO/W.PET/CERES
100 CREAM (GRAM) TOPICAL DAILY
Status: DISCONTINUED | OUTPATIENT
Start: 2023-12-24 | End: 2023-12-25 | Stop reason: HOSPADM

## 2023-12-23 RX ORDER — ONDANSETRON 2 MG/ML
4 INJECTION INTRAMUSCULAR; INTRAVENOUS ONCE
Status: DISCONTINUED | OUTPATIENT
Start: 2023-12-23 | End: 2023-12-23

## 2023-12-23 NOTE — ED NOTES
Pt refused IV to be placed, pt also refused EKG. Provider made aware.     Aruna Bernal RN  12/23/23 1541
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
## 2023-12-23 NOTE — ED NOTES
Nurse asked patient to provided urine sample when able to, patient agreed.     Aruna Bernal RN  12/23/23 2345       Aruna Bernal RN  12/23/23 9345

## 2023-12-23 NOTE — ED NOTES
"CIS went into to speak with the patient to complete the crisis and safety assessment. CIS went to introduce her self and the patient stated good bye. The patient is refusing to speak with CIS stating \" this is bullshit.\" The patient stated she is tired. CIS will give the patient sometime and try again       Katelin STYLES   "

## 2023-12-24 LAB
AMPHETAMINES SERPL QL SCN: NEGATIVE
ATRIAL RATE: 87 BPM
BARBITURATES UR QL: NEGATIVE
BENZODIAZ UR QL: NEGATIVE
BILIRUB UR QL STRIP: NEGATIVE
CLARITY UR: CLEAR
COCAINE UR QL: NEGATIVE
COLOR UR: YELLOW
GLUCOSE UR STRIP-MCNC: NEGATIVE MG/DL
HGB UR QL STRIP.AUTO: NEGATIVE
KETONES UR STRIP-MCNC: NEGATIVE MG/DL
LEUKOCYTE ESTERASE UR QL STRIP: NEGATIVE
METHADONE UR QL: NEGATIVE
NITRITE UR QL STRIP: NEGATIVE
OPIATES UR QL SCN: NEGATIVE
OXYCODONE+OXYMORPHONE UR QL SCN: NEGATIVE
P AXIS: 73 DEGREES
PCP UR QL: NEGATIVE
PH UR STRIP.AUTO: 6 [PH]
PR INTERVAL: 146 MS
PROT UR STRIP-MCNC: NEGATIVE MG/DL
QRS AXIS: 63 DEGREES
QRSD INTERVAL: 82 MS
QT INTERVAL: 390 MS
QTC INTERVAL: 469 MS
SP GR UR STRIP.AUTO: 1.02 (ref 1–1.03)
T WAVE AXIS: 51 DEGREES
THC UR QL: NEGATIVE
UROBILINOGEN UR STRIP-ACNC: <2 MG/DL
VENTRICULAR RATE: 87 BPM

## 2023-12-24 PROCEDURE — 80307 DRUG TEST PRSMV CHEM ANLYZR: CPT

## 2023-12-24 PROCEDURE — G0427 INPT/ED TELECONSULT70: HCPCS | Performed by: GENERAL PRACTICE

## 2023-12-24 PROCEDURE — 81003 URINALYSIS AUTO W/O SCOPE: CPT | Performed by: EMERGENCY MEDICINE

## 2023-12-24 RX ORDER — ACETAMINOPHEN 325 MG/1
975 TABLET ORAL ONCE
Status: COMPLETED | OUTPATIENT
Start: 2023-12-24 | End: 2023-12-24

## 2023-12-24 RX ADMIN — Medication 100 MG: at 08:24

## 2023-12-24 RX ADMIN — PANTOPRAZOLE SODIUM 40 MG: 40 TABLET, DELAYED RELEASE ORAL at 08:00

## 2023-12-24 RX ADMIN — MULTIPLE VITAMINS W/ MINERALS TAB 1 TABLET: TAB ORAL at 08:24

## 2023-12-24 RX ADMIN — FOLIC ACID 1 MG: 1 TABLET ORAL at 08:24

## 2023-12-24 RX ADMIN — PANTOPRAZOLE SODIUM 40 MG: 40 TABLET, DELAYED RELEASE ORAL at 16:32

## 2023-12-24 RX ADMIN — ACETAMINOPHEN 975 MG: 325 TABLET, FILM COATED ORAL at 16:33

## 2023-12-24 NOTE — ED NOTES
"CIS called over to [facility] to see if a 302 was petitioned for the patient. CIS spoke with Haris and no 302 was petitioned. CIS went and spoke with Dr Wise and explained that no 302 was petitioned.     Dr. Wise and CIS went in to see the patient. Dr Wise explained how the patient needed to speak to CIS so that we can see what the next steps would be. The patient stated \" get the fat bitch out of here. Im not talking to crisis. Crisis is a big brown piece of shit\" Dr Wise corrected the patient as that was not appropriate the patient stated she did not care. The patient refused to complete a crisis assessment. Dr Wise asked her is she would voluntarily sign herself in to the hospital. The patient agreed to sign herself in to the hospital for psychiatric care.            Chief Complaint   Patient presents with    Psychiatric Evaluation       Pt arrived via EMS with police. Per police, they were called to a hotel in [place] where hotel staff had complaints from pt and they wanted her removed from hotel property. Pt was making statements to police that she wanted to kill herself and also harm others. Hx of SI attempts in the past. Per pt's sister \"she needs to be in a psych cameron\". Pt admits to drinking a couple shots of ameya Santiago's and about three beers.        Katelin JOHNSON MSW   "

## 2023-12-24 NOTE — ED NOTES
Patient aware of needed urine sample, will attempt at 9-9:30 even if there is no urge to pee.     Gabe Paris RN  12/24/23 0950

## 2023-12-24 NOTE — ED CARE HANDOFF
Emergency Department Sign Out Note        Sign out and transfer of care from Dr. Allen. See Separate Emergency Department note.     The patient, Alyson Madera, was evaluated by the previous provider for increased agitation and difficulty with reorientation.  Patient was removed from the hotel property in which he has been residing.  Patient is undomiciled as she has been utilizing the hotel room as her primary area of residence.  She had expressed suicidal ideation to law enforcement individuals and has a history of ethanol misuse and was noted to be drinking this evening.  She was appreciated to be not clinically intoxicated at time of initial assessment in the emergency department..    Workup Completed:  Assessment by previous providers were noted that the patient denied HI/SI but was noted to be aggressive with staff.  Medically cleared for psychiatric evaluation   Based off of evaluation by emergency department providers, 201 voluntary admission completed.  Home meds ordered  WA protocol initiated  One to one observation initiated and ordered    ED Course / Workup Pending (followup):  Still awaiting psych facility placement at this time.  Patient is a bed search.                                     Procedures  Medical Decision Making  Patient did not have any acute complaints for me overnight.  I did not need to administer any as needed medication for agitation control.    Patient was stable at time of oncoming morning team and was signed out to the next provider.    Amount and/or Complexity of Data Reviewed  Labs: ordered.    Risk  OTC drugs.  Prescription drug management.            Disposition  Final diagnoses:   Suicidal ideation   Homicidal ideation   History of schizoaffective disorder   History of bipolar disorder     Time reflects when diagnosis was documented in both MDM as applicable and the Disposition within this note       Time User Action Codes Description Comment    12/23/2023  9:46 PM  Irma Allen Add [R45.851] Suicidal ideation     12/23/2023  9:46 PM Irma Allen Add [R45.850] Homicidal ideation     12/23/2023  9:46 PM Irma Allen Add [Z86.59] History of schizoaffective disorder     12/23/2023  9:46 PM Irma Allen Add [Z86.59] History of bipolar disorder           ED Disposition       None          Follow-up Information    None       Patient's Medications   Discharge Prescriptions    No medications on file     No discharge procedures on file.       ED Provider  Electronically Signed by     Ulises Ramos MD  12/24/23 0712

## 2023-12-24 NOTE — ED NOTES
CIS attempted to meet with patient.  CIS introduced self and patient rolled her eyes.  She reported that she hates crisis.  Patient stated that it was her sister's fault that she was here.  She stated that she was yelling and kicked out of the hotel.  Patient refused to provide any other information.  Order is being placed for a Psych Consult for length of stay.
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
## 2023-12-25 VITALS
RESPIRATION RATE: 20 BRPM | HEIGHT: 67 IN | WEIGHT: 135 LBS | BODY MASS INDEX: 21.19 KG/M2 | TEMPERATURE: 97.8 F | OXYGEN SATURATION: 97 % | HEART RATE: 76 BPM | DIASTOLIC BLOOD PRESSURE: 61 MMHG | SYSTOLIC BLOOD PRESSURE: 126 MMHG

## 2023-12-25 NOTE — ED NOTES
Pt sleeping with adequate and unlabored respirations. 1:1 remains in effect for pt safety.      Wanda Shelby RN  12/25/23 0702

## 2023-12-25 NOTE — ED NOTES
Per Intake, patient will need to be reviewed with unit doctors in the morning. Bed search initiated to following facilities:    [facility]: Beds available; chart faxed for review.   [facility]: Beds available; chart faxed for review.   [facility]: No bed availability until Tuesday.   [facility]: Spoke with Lynn, beds available; chart faxed for review.   [facility]: Spoke with [facility], beds available; chart faxed for review.   [facility]: No beds available.   [facility]: Spoke with [facility], beds available; chart faxed for review.   [facility]: Beds available; chart faxed for review.     Sandra Flor, MAGDALENO  12/24/23 2024

## 2023-12-25 NOTE — ED NOTES
Pt states she has a plan to stay at a hotel. Pt states she is going to borrow money off of a friend until she gets her disability check. Pt denies any hallucinations.      Wanda Shelby RN  12/25/23 0925

## 2023-12-25 NOTE — ED NOTES
Crisis Intervention Specialist (CIS) met with patient (Pt) at her request as she wants to leave ED.  Pt denies suicidal and homicidal ideation as well as any mental health history.  She denies psychosis.  She denies any past self harm attempts.  She stated that police were called the other night by the hotel she was staying at as there was a little boy in a room above her that was up at night jumping around, making noise that was prohibiting to her sleep.  Pt admits to making a statement to  to have the kid stop making noise.  Pt also admits to drinking alcohol daily and she does not feel it is a problem at this time.  Pt denies wanting resources for mental health and alcohol at this time.  She is accepting to shelter lists.  She stated that her plan is to rent another hotel if she cannot get into a shelter.  She stated that she has monthly income from SSD.  CIS gave and explained shelter list.  Pt stated that she is close to her  and will call him for a ride.

## 2023-12-25 NOTE — ED NOTES
Pt sleeping with adequate and unlabored respirations. 1:1 remains in effect for pt safety.      Wanda Shelby RN  12/25/23 0876

## 2023-12-25 NOTE — ED NOTES
Pt sleeping with adequate and unlabored respirations. 1:1 remains in effect for pt safety.      Wanda Shelby RN  12/25/23 2848

## 2023-12-25 NOTE — ED NOTES
Pt wakes up and requests to speak with this RN. Pt states she wants to sign out. Suicide and psychiatric assessment to follow. Dr Reza made aware and states pt should be re-evaluated by crisis. Crisis made aware via TT.      Wanda Shelby RN  12/25/23 0900

## 2023-12-25 NOTE — ED NOTES
[facility] booked for patient to be transported to emergency shelter.      Keyona Hull RN  12/25/23 3889

## 2023-12-25 NOTE — ED CARE HANDOFF
Emergency Department Sign Out Note        Sign out and transfer of care from Dr. JACQUE Aguirre et. al. See Separate Emergency Department note.     The patient, Alyson Madera, was evaluated by the previous provider for SI,HI, schizoaffective disorder, bipolar disorder.    Workup Completed:  yes    ED Course / Workup Pending (followup):  201 placement as per crisis.   Patient wants to go home and is not SI or HI and crisis worker saw patient this AM and patient is cleared psychiatrically for discharge and did not want detox. Will discharge home.                                      Procedures  Medical Decision Making  Amount and/or Complexity of Data Reviewed  Labs: ordered.    Risk  OTC drugs.  Prescription drug management.            Disposition  Final diagnoses:   Suicidal ideation   Homicidal ideation   History of schizoaffective disorder   History of bipolar disorder   Alcohol abuse     Time reflects when diagnosis was documented in both MDM as applicable and the Disposition within this note       Time User Action Codes Description Comment    12/23/2023  9:46 PM Son-Fannie Irma Add [R45.851] Suicidal ideation     12/23/2023  9:46 PM Son-Fannie Irma Add [R45.850] Homicidal ideation     12/23/2023  9:46 PM Son-Fannie, Irma Add [Z86.59] History of schizoaffective disorder     12/23/2023  9:46 PM Son-Fannie, Irma Add [Z86.59] History of bipolar disorder     12/25/2023  8:55 AM Vick Reza Add [F10.10] Alcohol abuse           ED Disposition       ED Disposition   Discharge    Condition   Stable    Date/Time   Mon Dec 25, 2023  8:55 AM    Comment   Alyson Madera discharge to home/self care.                   MD Documentation      Flowsheet Row Most Recent Value   Sending MD Dr. Alicea          Follow-up Information       Follow up With Specialties Details Why Contact Info Additional Information    [facility] Call in 1 day or own primary doctor. Return sooner if worse in anyway.  Discharge instructions as per crisis worker. [facility] [address] [phone] [facility], [address] [place] [address], [address] [phone]          Patient's Medications   Discharge Prescriptions    No medications on file     No discharge procedures on file.       ED Provider  Electronically Signed by     Vick Reza MD  12/25/23 0224       Vick Reza MD  12/25/23 0815

## 2023-12-25 NOTE — ED NOTES
Pt sleeping with adequate and unlabored respirations. 1:1 remains in effect for pt safety.      Wanda Shelby RN  12/25/23 0730

## 2023-12-25 NOTE — ED NOTES
Per EVS, patient has Medicare A&B; [place] coverage is Fee For Service.     MAGDALENO Yusuf  12/24/23 2009

## 2023-12-25 NOTE — ED NOTES
Pt sleeping with adequate and unlabored respirations. 1:1 remains in effect for pt safety.      Wanda Shelby RN  12/25/23 4247

## 2023-12-25 NOTE — ED NOTES
Pt sleeping with adequate and unlabored respirations. 1:1 remains in effect for pt safety.      Wanda Shelby RN  12/25/23 0781

## 2024-01-22 RX ORDER — BENZTROPINE MESYLATE 0.5 MG/1
1 TABLET ORAL 2 TIMES DAILY
COMMUNITY

## 2024-01-23 ENCOUNTER — OFFICE VISIT (OUTPATIENT)
Dept: FAMILY MEDICINE CLINIC | Facility: CLINIC | Age: 59
End: 2024-01-23
Payer: MEDICARE

## 2024-01-23 VITALS
WEIGHT: 139.2 LBS | SYSTOLIC BLOOD PRESSURE: 110 MMHG | BODY MASS INDEX: 21.8 KG/M2 | OXYGEN SATURATION: 97 % | HEART RATE: 95 BPM | DIASTOLIC BLOOD PRESSURE: 70 MMHG | TEMPERATURE: 97.2 F

## 2024-01-23 DIAGNOSIS — E78.5 HYPERLIPIDEMIA, UNSPECIFIED HYPERLIPIDEMIA TYPE: ICD-10-CM

## 2024-01-23 DIAGNOSIS — B35.1 ONYCHOMYCOSIS: ICD-10-CM

## 2024-01-23 DIAGNOSIS — I85.11 SECONDARY ESOPHAGEAL VARICES WITH BLEEDING (HCC): ICD-10-CM

## 2024-01-23 DIAGNOSIS — E87.1 HYPONATREMIA: ICD-10-CM

## 2024-01-23 DIAGNOSIS — Z12.31 ENCOUNTER FOR SCREENING MAMMOGRAM FOR BREAST CANCER: ICD-10-CM

## 2024-01-23 DIAGNOSIS — F10.920 ALCOHOLIC INTOXICATION WITHOUT COMPLICATION (HCC): ICD-10-CM

## 2024-01-23 DIAGNOSIS — E03.9 HYPOTHYROIDISM, UNSPECIFIED TYPE: ICD-10-CM

## 2024-01-23 DIAGNOSIS — J44.9 CHRONIC OBSTRUCTIVE PULMONARY DISEASE, UNSPECIFIED COPD TYPE (HCC): ICD-10-CM

## 2024-01-23 DIAGNOSIS — Z11.4 SCREENING FOR HIV (HUMAN IMMUNODEFICIENCY VIRUS): Primary | ICD-10-CM

## 2024-01-23 DIAGNOSIS — F31.9 BIPOLAR AFFECTIVE DISORDER, REMISSION STATUS UNSPECIFIED (HCC): ICD-10-CM

## 2024-01-23 DIAGNOSIS — K92.2 GI BLEED: ICD-10-CM

## 2024-01-23 DIAGNOSIS — F10.20 ALCOHOL USE DISORDER, SEVERE, DEPENDENCE (HCC): ICD-10-CM

## 2024-01-23 DIAGNOSIS — E87.6 HYPOKALEMIA: ICD-10-CM

## 2024-01-23 DIAGNOSIS — K70.30 ALCOHOLIC CIRRHOSIS OF LIVER WITHOUT ASCITES (HCC): ICD-10-CM

## 2024-01-23 DIAGNOSIS — M87.059 AVASCULAR NECROSIS OF BONE OF HIP, UNSPECIFIED LATERALITY (HCC): ICD-10-CM

## 2024-01-23 DIAGNOSIS — J96.01 ACUTE RESPIRATORY FAILURE WITH HYPOXIA (HCC): ICD-10-CM

## 2024-01-23 DIAGNOSIS — D69.6 THROMBOCYTOPENIA (HCC): ICD-10-CM

## 2024-01-23 DIAGNOSIS — F10.10 ALCOHOL ABUSE: ICD-10-CM

## 2024-01-23 DIAGNOSIS — F25.0 SCHIZOAFFECTIVE DISORDER, BIPOLAR TYPE (HCC): ICD-10-CM

## 2024-01-23 PROCEDURE — G0439 PPPS, SUBSEQ VISIT: HCPCS | Performed by: FAMILY MEDICINE

## 2024-01-23 PROCEDURE — 99204 OFFICE O/P NEW MOD 45 MIN: CPT | Performed by: FAMILY MEDICINE

## 2024-01-23 RX ORDER — FOLIC ACID 1 MG/1
1 TABLET ORAL DAILY
Qty: 30 TABLET | Refills: 0 | Status: SHIPPED | OUTPATIENT
Start: 2024-01-23

## 2024-01-23 RX ORDER — LANOLIN ALCOHOL/MO/W.PET/CERES
100 CREAM (GRAM) TOPICAL DAILY
Qty: 30 TABLET | Refills: 0 | Status: SHIPPED | OUTPATIENT
Start: 2024-01-23

## 2024-01-23 RX ORDER — PRENATAL VIT 91/IRON/FOLIC/DHA 28-975-200
COMBINATION PACKAGE (EA) ORAL 2 TIMES DAILY
Qty: 42 G | Refills: 0 | Status: SHIPPED | OUTPATIENT
Start: 2024-01-23

## 2024-01-23 NOTE — PATIENT INSTRUCTIONS
Medicare Preventive Visit Patient Instructions  Thank you for completing your Welcome to Medicare Visit or Medicare Annual Wellness Visit today. Your next wellness visit will be due in one year (1/23/2025).  The screening/preventive services that you may require over the next 5-10 years are detailed below. Some tests may not apply to you based off risk factors and/or age. Screening tests ordered at today's visit but not completed yet may show as past due. Also, please note that scanned in results may not display below.  Preventive Screenings:  Service Recommendations Previous Testing/Comments   Colorectal Cancer Screening  * Colonoscopy    * Fecal Occult Blood Test (FOBT)/Fecal Immunochemical Test (FIT)  * Fecal DNA/Cologuard Test  * Flexible Sigmoidoscopy Age: 45-75 years old   Colonoscopy: every 10 years (may be performed more frequently if at higher risk)  OR  FOBT/FIT: every 1 year  OR  Cologuard: every 3 years  OR  Sigmoidoscopy: every 5 years  Screening may be recommended earlier than age 45 if at higher risk for colorectal cancer. Also, an individualized decision between you and your healthcare provider will decide whether screening between the ages of 76-85 would be appropriate. Colonoscopy: 02/16/2021  FOBT/FIT: 02/16/2021  Cologuard: 02/16/2021  Sigmoidoscopy: 02/16/2021          Breast Cancer Screening Age: 40+ years old  Frequency: every 1-2 years  Not required if history of left and right mastectomy Mammogram: 06/02/2011        Cervical Cancer Screening Between the ages of 21-29, pap smear recommended once every 3 years.   Between the ages of 30-65, can perform pap smear with HPV co-testing every 5 years.   Recommendations may differ for women with a history of total hysterectomy, cervical cancer, or abnormal pap smears in past. Pap Smear: Not on file        Hepatitis C Screening Once for adults born between 1945 and 1965  More frequently in patients at high risk for Hepatitis C Hep C Antibody:  10/22/2019    Screening Current   Diabetes Screening 1-2 times per year if you're at risk for diabetes or have pre-diabetes Fasting glucose: No results in last 5 years (No results in last 5 years)  A1C: No results in last 5 years (No results in last 5 years)  Screening Current   Cholesterol Screening Once every 5 years if you don't have a lipid disorder. May order more often based on risk factors. Lipid panel: Not on file    Screening Not Indicated  History Lipid Disorder     Other Preventive Screenings Covered by Medicare:  Abdominal Aortic Aneurysm (AAA) Screening: covered once if your at risk. You're considered to be at risk if you have a family history of AAA.  Lung Cancer Screening: covers low dose CT scan once per year if you meet all of the following conditions: (1) Age 55-77; (2) No signs or symptoms of lung cancer; (3) Current smoker or have quit smoking within the last 15 years; (4) You have a tobacco smoking history of at least 20 pack years (packs per day multiplied by number of years you smoked); (5) You get a written order from a healthcare provider.  Glaucoma Screening: covered annually if you're considered high risk: (1) You have diabetes OR (2) Family history of glaucoma OR (3)  aged 50 and older OR (4)  American aged 65 and older  Osteoporosis Screening: covered every 2 years if you meet one of the following conditions: (1) You're estrogen deficient and at risk for osteoporosis based off medical history and other findings; (2) Have a vertebral abnormality; (3) On glucocorticoid therapy for more than 3 months; (4) Have primary hyperparathyroidism; (5) On osteoporosis medications and need to assess response to drug therapy.   Last bone density test (DXA Scan): Not on file.  HIV Screening: covered annually if you're between the age of 15-65. Also covered annually if you are younger than 15 and older than 65 with risk factors for HIV infection. For pregnant patients, it is  covered up to 3 times per pregnancy.    Immunizations:  Immunization Recommendations   Influenza Vaccine Annual influenza vaccination during flu season is recommended for all persons aged >= 6 months who do not have contraindications   Pneumococcal Vaccine   * Pneumococcal conjugate vaccine = PCV13 (Prevnar 13), PCV15 (Vaxneuvance), PCV20 (Prevnar 20)  * Pneumococcal polysaccharide vaccine = PPSV23 (Pneumovax) Adults 19-65 yo with certain risk factors or if 65+ yo  If never received any pneumonia vaccine: recommend Prevnar 20 (PCV20)  Give PCV20 if previously received 1 dose of PCV13 or PPSV23   Hepatitis B Vaccine 3 dose series if at intermediate or high risk (ex: diabetes, end stage renal disease, liver disease)   Respiratory syncytial virus (RSV) Vaccine - COVERED BY MEDICARE PART D  * RSVPreF3 (Arexvy) CDC recommends that adults 60 years of age and older may receive a single dose of RSV vaccine using shared clinical decision-making (SCDM)   Tetanus (Td) Vaccine - COST NOT COVERED BY MEDICARE PART B Following completion of primary series, a booster dose should be given every 10 years to maintain immunity against tetanus. Td may also be given as tetanus wound prophylaxis.   Tdap Vaccine - COST NOT COVERED BY MEDICARE PART B Recommended at least once for all adults. For pregnant patients, recommended with each pregnancy.   Shingles Vaccine (Shingrix) - COST NOT COVERED BY MEDICARE PART B  2 shot series recommended in those 19 years and older who have or will have weakened immune systems or those 50 years and older     Health Maintenance Due:      Topic Date Due   • HIV Screening  Never done   • Breast Cancer Screening: Mammogram  Never done   • Colorectal Cancer Screening  Never done   • Hepatitis C Screening  Completed     Immunizations Due:      Topic Date Due   • Hepatitis A Vaccine (1 of 2 - Risk 2-dose series) Never done   • Influenza Vaccine (1) 09/01/2023   • COVID-19 Vaccine (4 - 2023-24 season) 09/01/2023      Advance Directives   What are advance directives?  Advance directives are legal documents that state your wishes and plans for medical care. These plans are made ahead of time in case you lose your ability to make decisions for yourself. Advance directives can apply to any medical decision, such as the treatments you want, and if you want to donate organs.   What are the types of advance directives?  There are many types of advance directives, and each state has rules about how to use them. You may choose a combination of any of the following:  Living will:  This is a written record of the treatment you want. You can also choose which treatments you do not want, which to limit, and which to stop at a certain time. This includes surgery, medicine, IV fluid, and tube feedings.   Durable power of  for healthcare (DPAHC):  This is a written record that states who you want to make healthcare choices for you when you are unable to make them for yourself. This person, called a proxy, is usually a family member or a friend. You may choose more than 1 proxy.  Do not resuscitate (DNR) order:  A DNR order is used in case your heart stops beating or you stop breathing. It is a request not to have certain forms of treatment, such as CPR. A DNR order may be included in other types of advance directives.  Medical directive:  This covers the care that you want if you are in a coma, near death, or unable to make decisions for yourself. You can list the treatments you want for each condition. Treatment may include pain medicine, surgery, blood transfusions, dialysis, IV or tube feedings, and a ventilator (breathing machine).  Values history:  This document has questions about your views, beliefs, and how you feel and think about life. This information can help others choose the care that you would choose.  Why are advance directives important?  An advance directive helps you control your care. Although spoken wishes may  be used, it is better to have your wishes written down. Spoken wishes can be misunderstood, or not followed. Treatments may be given even if you do not want them. An advance directive may make it easier for your family to make difficult choices about your care.   Cigarette Smoking and Your Health   Risks to your health if you smoke:  Nicotine and other chemicals found in tobacco damage every cell in your body. Even if you are a light smoker, you have an increased risk for cancer, heart disease, and lung disease. If you are pregnant or have diabetes, smoking increases your risk for complications.   Benefits to your health if you stop smoking:   You decrease respiratory symptoms such as coughing, wheezing, and shortness of breath.   You reduce your risk for cancers of the lung, mouth, throat, kidney, bladder, pancreas, stomach, and cervix. If you already have cancer, you increase the benefits of chemotherapy. You also reduce your risk for cancer returning or a second cancer from developing.   You reduce your risk for heart disease, blood clots, heart attack, and stroke.   You reduce your risk for lung infections, and diseases such as pneumonia, asthma, chronic bronchitis, and emphysema.  Your circulation improves. More oxygen can be delivered to your body. If you have diabetes, you lower your risk for complications, such as kidney, artery, and eye diseases. You also lower your risk for nerve damage. Nerve damage can lead to amputations, poor vision, and blindness.  You improve your body's ability to heal and to fight infections.  For more information and support to stop smoking:   Smokefree.gov  Phone: 2- 742 - 576-3580  Web Address: www.[employer]     © Copyright [employer] 2018 Information is for End User's use only and may not be sold, redistributed or otherwise used for commercial purposes. All illustrations and images included in CareNotes® are the copyrighted property of [employer] or [employer]

## 2024-01-23 NOTE — ASSESSMENT & PLAN NOTE
Unchanged  Continues to drink 6 pack of beers  Has no desire to quit  Patient self medicates her anxiety and bipolar depression  Will restart folic acid and b12
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
## 2024-01-24 PROBLEM — J96.01 ACUTE RESPIRATORY FAILURE WITH HYPOXIA (HCC): Status: RESOLVED | Noted: 2024-01-23 | Resolved: 2024-01-24

## 2024-01-24 NOTE — ASSESSMENT & PLAN NOTE
Recent hospitalization and started on haldol  Patient no longer on any psych medications  Would like to see previous psychiatrsit, dr. Narayanan  Referral placed today  Is not currently suicidal

## 2024-01-24 NOTE — ASSESSMENT & PLAN NOTE
Lab Results   Component Value Date    [id number] 1.445 12/23/2023     Stable  No longer on levothyroxine

## 2024-01-24 NOTE — ASSESSMENT & PLAN NOTE
Resolved  In the setting of bleeding varicoceles  Continues to drink 6 pack beer daily  No desire to quit

## 2024-01-24 NOTE — ASSESSMENT & PLAN NOTE
Lab Results   Component Value Date    WBC 6.24 12/23/2023    HGB 10.3 (L) 12/23/2023    HCT 33.7 (L) 12/23/2023    MCV 83 12/23/2023     12/23/2023     Stable  In the setting of alcoholic cirrhosis  Referral to gi provided

## 2024-01-24 NOTE — ASSESSMENT & PLAN NOTE
Patient is no longer taking lactulose due to nausea  Did not follow up with hepatology  Referral placed today

## 2024-02-12 ENCOUNTER — TELEPHONE (OUTPATIENT)
Dept: FAMILY MEDICINE CLINIC | Facility: CLINIC | Age: 59
End: 2024-02-12

## 2024-02-16 DIAGNOSIS — F10.10 ALCOHOL ABUSE: ICD-10-CM

## 2024-02-16 DIAGNOSIS — K92.2 GI BLEED: ICD-10-CM

## 2024-02-16 RX ORDER — LANOLIN ALCOHOL/MO/W.PET/CERES
100 CREAM (GRAM) TOPICAL DAILY
Qty: 90 TABLET | Refills: 1 | Status: SHIPPED | OUTPATIENT
Start: 2024-02-16

## 2024-02-16 RX ORDER — FOLIC ACID 1 MG/1
1000 TABLET ORAL DAILY
Qty: 90 TABLET | Refills: 1 | Status: SHIPPED | OUTPATIENT
Start: 2024-02-16

## 2024-03-17 NOTE — ED NOTES
"Attempted to call pt contact \"Ed\" who is a friend. Message left. No locations at this time for pt to be transported to.      Wanda Shelby RN  12/25/23 [id number]    " No

## 2024-05-21 ENCOUNTER — HOSPITAL ENCOUNTER (EMERGENCY)
Facility: HOSPITAL | Age: 59
Discharge: HOME | End: 2024-05-22
Attending: EMERGENCY MEDICINE
Payer: MEDICARE

## 2024-05-21 DIAGNOSIS — R45.850 HOMICIDAL IDEATION: ICD-10-CM

## 2024-05-21 DIAGNOSIS — Z00.8 ENCOUNTER FOR PSYCHOLOGICAL EVALUATION: ICD-10-CM

## 2024-05-21 DIAGNOSIS — F10.920 ALCOHOLIC INTOXICATION WITHOUT COMPLICATION (HCC): Primary | ICD-10-CM

## 2024-05-21 LAB
ALBUMIN SERPL BCP-MCNC: 4 G/DL (ref 3.5–5)
ALP SERPL-CCNC: 132 U/L (ref 34–104)
ALT SERPL W P-5'-P-CCNC: 19 U/L (ref 7–52)
ANION GAP SERPL CALCULATED.3IONS-SCNC: 13 MMOL/L (ref 4–13)
ANION GAP SERPL CALCULATED.3IONS-SCNC: 16 MMOL/L (ref 4–13)
AST SERPL W P-5'-P-CCNC: 50 U/L (ref 13–39)
ATRIAL RATE: 84 BPM
BASOPHILS # BLD AUTO: 0.06 THOUSANDS/ÂΜL (ref 0–0.1)
BASOPHILS NFR BLD AUTO: 1 % (ref 0–1)
BILIRUB SERPL-MCNC: 0.57 MG/DL (ref 0.2–1)
BUN SERPL-MCNC: 6 MG/DL (ref 5–25)
BUN SERPL-MCNC: 7 MG/DL (ref 5–25)
CALCIUM SERPL-MCNC: 8.6 MG/DL (ref 8.4–10.2)
CALCIUM SERPL-MCNC: 9.3 MG/DL (ref 8.4–10.2)
CHLORIDE SERPL-SCNC: 104 MMOL/L (ref 96–108)
CHLORIDE SERPL-SCNC: 108 MMOL/L (ref 96–108)
CO2 SERPL-SCNC: 18 MMOL/L (ref 21–32)
CO2 SERPL-SCNC: 18 MMOL/L (ref 21–32)
CREAT SERPL-MCNC: 0.4 MG/DL (ref 0.6–1.3)
CREAT SERPL-MCNC: 0.4 MG/DL (ref 0.6–1.3)
EOSINOPHIL # BLD AUTO: 0.08 THOUSAND/ÂΜL (ref 0–0.61)
EOSINOPHIL NFR BLD AUTO: 1 % (ref 0–6)
ERYTHROCYTE [DISTWIDTH] IN BLOOD BY AUTOMATED COUNT: 15.7 % (ref 11.6–15.1)
ETHANOL SERPL-MCNC: 128 MG/DL
GFR SERPL CREATININE-BSD FRML MDRD: 114 ML/MIN/1.73SQ M
GFR SERPL CREATININE-BSD FRML MDRD: 114 ML/MIN/1.73SQ M
GLUCOSE SERPL-MCNC: 172 MG/DL (ref 65–140)
GLUCOSE SERPL-MCNC: 77 MG/DL (ref 65–140)
HCT VFR BLD AUTO: 38.4 % (ref 34.8–46.1)
HGB BLD-MCNC: 12.5 G/DL (ref 11.5–15.4)
IMM GRANULOCYTES # BLD AUTO: 0.03 THOUSAND/UL (ref 0–0.2)
IMM GRANULOCYTES NFR BLD AUTO: 1 % (ref 0–2)
LACTATE SERPL-SCNC: 1.1 MMOL/L (ref 0.5–2)
LYMPHOCYTES # BLD AUTO: 1.66 THOUSANDS/ÂΜL (ref 0.6–4.47)
LYMPHOCYTES NFR BLD AUTO: 26 % (ref 14–44)
MCH RBC QN AUTO: 31.4 PG (ref 26.8–34.3)
MCHC RBC AUTO-ENTMCNC: 32.6 G/DL (ref 31.4–37.4)
MCV RBC AUTO: 97 FL (ref 82–98)
MONOCYTES # BLD AUTO: 0.69 THOUSAND/ÂΜL (ref 0.17–1.22)
MONOCYTES NFR BLD AUTO: 11 % (ref 4–12)
NEUTROPHILS # BLD AUTO: 3.87 THOUSANDS/ÂΜL (ref 1.85–7.62)
NEUTS SEG NFR BLD AUTO: 60 % (ref 43–75)
NRBC BLD AUTO-RTO: 0 /100 WBCS
P AXIS: 67 DEGREES
PLATELET # BLD AUTO: 223 THOUSANDS/UL (ref 149–390)
PMV BLD AUTO: 9.7 FL (ref 8.9–12.7)
POTASSIUM SERPL-SCNC: 2.8 MMOL/L (ref 3.5–5.3)
POTASSIUM SERPL-SCNC: 3.1 MMOL/L (ref 3.5–5.3)
PR INTERVAL: 150 MS
PROT SERPL-MCNC: 7.7 G/DL (ref 6.4–8.4)
QRS AXIS: 47 DEGREES
QRSD INTERVAL: 88 MS
QT INTERVAL: 400 MS
QTC INTERVAL: 472 MS
RBC # BLD AUTO: 3.98 MILLION/UL (ref 3.81–5.12)
SODIUM SERPL-SCNC: 138 MMOL/L (ref 135–147)
SODIUM SERPL-SCNC: 139 MMOL/L (ref 135–147)
T WAVE AXIS: 47 DEGREES
TSH SERPL DL<=0.05 MIU/L-ACNC: 2.2 UIU/ML (ref 0.45–4.5)
VENTRICULAR RATE: 84 BPM
WBC # BLD AUTO: 6.39 THOUSAND/UL (ref 4.31–10.16)

## 2024-05-21 PROCEDURE — 96366 THER/PROPH/DIAG IV INF ADDON: CPT

## 2024-05-21 PROCEDURE — 99283 EMERGENCY DEPT VISIT LOW MDM: CPT

## 2024-05-21 PROCEDURE — 96372 THER/PROPH/DIAG INJ SC/IM: CPT

## 2024-05-21 PROCEDURE — 93005 ELECTROCARDIOGRAM TRACING: CPT

## 2024-05-21 PROCEDURE — 85025 COMPLETE CBC W/AUTO DIFF WBC: CPT | Performed by: EMERGENCY MEDICINE

## 2024-05-21 PROCEDURE — 83605 ASSAY OF LACTIC ACID: CPT | Performed by: EMERGENCY MEDICINE

## 2024-05-21 PROCEDURE — 96365 THER/PROPH/DIAG IV INF INIT: CPT

## 2024-05-21 PROCEDURE — 80048 BASIC METABOLIC PNL TOTAL CA: CPT | Performed by: INTERNAL MEDICINE

## 2024-05-21 PROCEDURE — 84443 ASSAY THYROID STIM HORMONE: CPT | Performed by: EMERGENCY MEDICINE

## 2024-05-21 PROCEDURE — 80053 COMPREHEN METABOLIC PANEL: CPT | Performed by: EMERGENCY MEDICINE

## 2024-05-21 PROCEDURE — 36415 COLL VENOUS BLD VENIPUNCTURE: CPT | Performed by: EMERGENCY MEDICINE

## 2024-05-21 PROCEDURE — 99285 EMERGENCY DEPT VISIT HI MDM: CPT | Performed by: EMERGENCY MEDICINE

## 2024-05-21 PROCEDURE — 93010 ELECTROCARDIOGRAM REPORT: CPT | Performed by: INTERNAL MEDICINE

## 2024-05-21 PROCEDURE — 82077 ASSAY SPEC XCP UR&BREATH IA: CPT | Performed by: EMERGENCY MEDICINE

## 2024-05-21 PROCEDURE — 96367 TX/PROPH/DG ADDL SEQ IV INF: CPT

## 2024-05-21 PROCEDURE — 82075 ASSAY OF BREATH ETHANOL: CPT | Performed by: EMERGENCY MEDICINE

## 2024-05-21 RX ORDER — WATER 10 ML/10ML
INJECTION INTRAMUSCULAR; INTRAVENOUS; SUBCUTANEOUS
Status: COMPLETED
Start: 2024-05-21 | End: 2024-05-21

## 2024-05-21 RX ORDER — POTASSIUM CHLORIDE 20 MEQ/1
40 TABLET, EXTENDED RELEASE ORAL ONCE
Status: COMPLETED | OUTPATIENT
Start: 2024-05-21 | End: 2024-05-21

## 2024-05-21 RX ORDER — MIDAZOLAM HYDROCHLORIDE 2 MG/2ML
4 INJECTION, SOLUTION INTRAMUSCULAR; INTRAVENOUS ONCE
Status: COMPLETED | OUTPATIENT
Start: 2024-05-21 | End: 2024-05-21

## 2024-05-21 RX ORDER — SODIUM CHLORIDE 9 MG/ML
100 INJECTION, SOLUTION INTRAVENOUS CONTINUOUS
Status: DISCONTINUED | OUTPATIENT
Start: 2024-05-21 | End: 2024-05-22 | Stop reason: HOSPADM

## 2024-05-21 RX ORDER — POTASSIUM CHLORIDE 14.9 MG/ML
20 INJECTION INTRAVENOUS
Status: COMPLETED | OUTPATIENT
Start: 2024-05-21 | End: 2024-05-22

## 2024-05-21 RX ORDER — OLANZAPINE 10 MG/2ML
10 INJECTION, POWDER, FOR SOLUTION INTRAMUSCULAR ONCE
Status: COMPLETED | OUTPATIENT
Start: 2024-05-21 | End: 2024-05-21

## 2024-05-21 RX ADMIN — POTASSIUM CHLORIDE 20 MEQ: 14.9 INJECTION, SOLUTION INTRAVENOUS at 21:46

## 2024-05-21 RX ADMIN — DEXTROSE AND SODIUM CHLORIDE 1000 ML: 5; .9 INJECTION, SOLUTION INTRAVENOUS at 18:51

## 2024-05-21 RX ADMIN — SODIUM CHLORIDE 100 ML/HR: 0.9 INJECTION, SOLUTION INTRAVENOUS at 21:48

## 2024-05-21 RX ADMIN — MIDAZOLAM HYDROCHLORIDE 4 MG: 1 INJECTION, SOLUTION INTRAMUSCULAR; INTRAVENOUS at 16:08

## 2024-05-21 RX ADMIN — POTASSIUM CHLORIDE 20 MEQ: 14.9 INJECTION, SOLUTION INTRAVENOUS at 23:43

## 2024-05-21 RX ADMIN — POTASSIUM CHLORIDE 40 MEQ: 1500 TABLET, EXTENDED RELEASE ORAL at 21:44

## 2024-05-21 RX ADMIN — OLANZAPINE 10 MG: 10 INJECTION, POWDER, FOR SOLUTION INTRAMUSCULAR at 16:08
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
## 2024-05-21 NOTE — ED NOTES
Pt now sleeping, no distress noted.  Respirations even/unlabored.  Virtual monitor and frequent checks continue for safety     Jose Luis Andres RN  05/21/24 0845

## 2024-05-22 VITALS
BODY MASS INDEX: 17.84 KG/M2 | DIASTOLIC BLOOD PRESSURE: 60 MMHG | SYSTOLIC BLOOD PRESSURE: 90 MMHG | OXYGEN SATURATION: 95 % | TEMPERATURE: 98 F | HEART RATE: 77 BPM | RESPIRATION RATE: 16 BRPM | WEIGHT: 113.9 LBS

## 2024-05-22 LAB
AMPHETAMINES SERPL QL SCN: NEGATIVE
ANION GAP SERPL CALCULATED.3IONS-SCNC: 9 MMOL/L (ref 4–13)
BARBITURATES UR QL: NEGATIVE
BENZODIAZ UR QL: POSITIVE
BUN SERPL-MCNC: 8 MG/DL (ref 5–25)
CALCIUM SERPL-MCNC: 8.7 MG/DL (ref 8.4–10.2)
CHLORIDE SERPL-SCNC: 110 MMOL/L (ref 96–108)
CO2 SERPL-SCNC: 20 MMOL/L (ref 21–32)
COCAINE UR QL: NEGATIVE
CREAT SERPL-MCNC: 0.37 MG/DL (ref 0.6–1.3)
ETHANOL EXG-MCNC: 0 MG/DL
FENTANYL UR QL SCN: NEGATIVE
GFR SERPL CREATININE-BSD FRML MDRD: 117 ML/MIN/1.73SQ M
GLUCOSE SERPL-MCNC: 120 MG/DL (ref 65–140)
HYDROCODONE UR QL SCN: NEGATIVE
METHADONE UR QL: NEGATIVE
OPIATES UR QL SCN: NEGATIVE
OXYCODONE+OXYMORPHONE UR QL SCN: NEGATIVE
PCP UR QL: NEGATIVE
POTASSIUM SERPL-SCNC: 4.5 MMOL/L (ref 3.5–5.3)
SODIUM SERPL-SCNC: 139 MMOL/L (ref 135–147)
THC UR QL: NEGATIVE

## 2024-05-22 PROCEDURE — 96366 THER/PROPH/DIAG IV INF ADDON: CPT

## 2024-05-22 PROCEDURE — 80307 DRUG TEST PRSMV CHEM ANLYZR: CPT | Performed by: EMERGENCY MEDICINE

## 2024-05-22 PROCEDURE — 80048 BASIC METABOLIC PNL TOTAL CA: CPT | Performed by: INTERNAL MEDICINE

## 2024-05-22 PROCEDURE — 36415 COLL VENOUS BLD VENIPUNCTURE: CPT | Performed by: INTERNAL MEDICINE
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
## 2024-06-24 ENCOUNTER — APPOINTMENT (EMERGENCY)
Dept: CT IMAGING | Facility: HOSPITAL | Age: 59
DRG: 480 | End: 2024-06-24
Payer: MEDICARE

## 2024-06-24 ENCOUNTER — APPOINTMENT (EMERGENCY)
Dept: RADIOLOGY | Facility: HOSPITAL | Age: 59
DRG: 480 | End: 2024-06-24
Payer: MEDICARE

## 2024-06-24 ENCOUNTER — HOSPITAL ENCOUNTER (INPATIENT)
Facility: HOSPITAL | Age: 59
LOS: 9 days | Discharge: LEFT BEFORE DISCHARGE | DRG: 480 | End: 2024-07-03
Attending: EMERGENCY MEDICINE | Admitting: STUDENT IN AN ORGANIZED HEALTH CARE EDUCATION/TRAINING PROGRAM
Payer: MEDICARE

## 2024-06-24 DIAGNOSIS — F10.920 ALCOHOLIC INTOXICATION WITHOUT COMPLICATION (HCC): ICD-10-CM

## 2024-06-24 DIAGNOSIS — S72.145A CLOSED NONDISPLACED INTERTROCHANTERIC FRACTURE OF LEFT FEMUR, INITIAL ENCOUNTER (HCC): Primary | ICD-10-CM

## 2024-06-24 DIAGNOSIS — F25.0 SCHIZOAFFECTIVE DISORDER, BIPOLAR TYPE (HCC): ICD-10-CM
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
## 2024-06-24 LAB
ALBUMIN SERPL BCG-MCNC: 3.8 G/DL (ref 3.5–5)
ALP SERPL-CCNC: 112 U/L (ref 34–104)
ALT SERPL W P-5'-P-CCNC: 17 U/L (ref 7–52)
ANION GAP SERPL CALCULATED.3IONS-SCNC: 13 MMOL/L (ref 4–13)
APTT PPP: 28 SECONDS (ref 23–37)
AST SERPL W P-5'-P-CCNC: 46 U/L (ref 13–39)
BASOPHILS # BLD AUTO: 0.06 THOUSANDS/ÂΜL (ref 0–0.1)
BASOPHILS NFR BLD AUTO: 1 % (ref 0–1)
BILIRUB SERPL-MCNC: 0.6 MG/DL (ref 0.2–1)
BUN SERPL-MCNC: 9 MG/DL (ref 5–25)
CALCIUM SERPL-MCNC: 8.9 MG/DL (ref 8.4–10.2)
CHLORIDE SERPL-SCNC: 109 MMOL/L (ref 96–108)
CO2 SERPL-SCNC: 18 MMOL/L (ref 21–32)
CREAT SERPL-MCNC: 0.38 MG/DL (ref 0.6–1.3)
EOSINOPHIL # BLD AUTO: 0.07 THOUSAND/ÂΜL (ref 0–0.61)
EOSINOPHIL NFR BLD AUTO: 1 % (ref 0–6)
ERYTHROCYTE [DISTWIDTH] IN BLOOD BY AUTOMATED COUNT: 16.5 % (ref 11.6–15.1)
ETHANOL SERPL-MCNC: 170 MG/DL
GFR SERPL CREATININE-BSD FRML MDRD: 115 ML/MIN/1.73SQ M
GLUCOSE SERPL-MCNC: 65 MG/DL (ref 65–140)
HCT VFR BLD AUTO: 38.2 % (ref 34.8–46.1)
HGB BLD-MCNC: 12.7 G/DL (ref 11.5–15.4)
IMM GRANULOCYTES # BLD AUTO: 0.05 THOUSAND/UL (ref 0–0.2)
IMM GRANULOCYTES NFR BLD AUTO: 1 % (ref 0–2)
INR PPP: 1.02 (ref 0.84–1.19)
LYMPHOCYTES # BLD AUTO: 1.66 THOUSANDS/ÂΜL (ref 0.6–4.47)
LYMPHOCYTES NFR BLD AUTO: 17 % (ref 14–44)
MCH RBC QN AUTO: 31.9 PG (ref 26.8–34.3)
MCHC RBC AUTO-ENTMCNC: 33.2 G/DL (ref 31.4–37.4)
MCV RBC AUTO: 96 FL (ref 82–98)
MONOCYTES # BLD AUTO: 0.81 THOUSAND/ÂΜL (ref 0.17–1.22)
MONOCYTES NFR BLD AUTO: 8 % (ref 4–12)
NEUTROPHILS # BLD AUTO: 7.27 THOUSANDS/ÂΜL (ref 1.85–7.62)
NEUTS SEG NFR BLD AUTO: 72 % (ref 43–75)
NRBC BLD AUTO-RTO: 0 /100 WBCS
PLATELET # BLD AUTO: 304 THOUSANDS/UL (ref 149–390)
PMV BLD AUTO: 10.1 FL (ref 8.9–12.7)
POTASSIUM SERPL-SCNC: 3.5 MMOL/L (ref 3.5–5.3)
PROT SERPL-MCNC: 7.3 G/DL (ref 6.4–8.4)
PROTHROMBIN TIME: 13.6 SECONDS (ref 11.6–14.5)
RBC # BLD AUTO: 3.98 MILLION/UL (ref 3.81–5.12)
SODIUM SERPL-SCNC: 140 MMOL/L (ref 135–147)
WBC # BLD AUTO: 9.92 THOUSAND/UL (ref 4.31–10.16)

## 2024-06-24 PROCEDURE — 85730 THROMBOPLASTIN TIME PARTIAL: CPT | Performed by: EMERGENCY MEDICINE

## 2024-06-24 PROCEDURE — 96365 THER/PROPH/DIAG IV INF INIT: CPT

## 2024-06-24 PROCEDURE — 85025 COMPLETE CBC W/AUTO DIFF WBC: CPT | Performed by: EMERGENCY MEDICINE

## 2024-06-24 PROCEDURE — 36415 COLL VENOUS BLD VENIPUNCTURE: CPT | Performed by: EMERGENCY MEDICINE

## 2024-06-24 PROCEDURE — 73502 X-RAY EXAM HIP UNI 2-3 VIEWS: CPT

## 2024-06-24 PROCEDURE — 99285 EMERGENCY DEPT VISIT HI MDM: CPT | Performed by: EMERGENCY MEDICINE

## 2024-06-24 PROCEDURE — 70450 CT HEAD/BRAIN W/O DYE: CPT

## 2024-06-24 PROCEDURE — 99284 EMERGENCY DEPT VISIT MOD MDM: CPT

## 2024-06-24 PROCEDURE — 80053 COMPREHEN METABOLIC PANEL: CPT | Performed by: EMERGENCY MEDICINE

## 2024-06-24 PROCEDURE — 96375 TX/PRO/DX INJ NEW DRUG ADDON: CPT

## 2024-06-24 PROCEDURE — 72125 CT NECK SPINE W/O DYE: CPT

## 2024-06-24 PROCEDURE — 82077 ASSAY SPEC XCP UR&BREATH IA: CPT | Performed by: EMERGENCY MEDICINE

## 2024-06-24 PROCEDURE — 85610 PROTHROMBIN TIME: CPT | Performed by: EMERGENCY MEDICINE

## 2024-06-24 PROCEDURE — 73700 CT LOWER EXTREMITY W/O DYE: CPT

## 2024-06-24 RX ORDER — TRANEXAMIC ACID 10 MG/ML
1000 INJECTION, SOLUTION INTRAVENOUS ONCE
Status: COMPLETED | OUTPATIENT
Start: 2024-06-24 | End: 2024-06-24

## 2024-06-24 RX ORDER — KETOROLAC TROMETHAMINE 30 MG/ML
15 INJECTION, SOLUTION INTRAMUSCULAR; INTRAVENOUS ONCE
Status: COMPLETED | OUTPATIENT
Start: 2024-06-25 | End: 2024-06-25

## 2024-06-24 RX ORDER — MORPHINE SULFATE 4 MG/ML
4 INJECTION, SOLUTION INTRAMUSCULAR; INTRAVENOUS ONCE
Status: COMPLETED | OUTPATIENT
Start: 2024-06-25 | End: 2024-06-25

## 2024-06-24 RX ADMIN — SODIUM CHLORIDE, SODIUM LACTATE, POTASSIUM CHLORIDE, AND CALCIUM CHLORIDE 1000 ML: .6; .31; .03; .02 INJECTION, SOLUTION INTRAVENOUS at 21:50

## 2024-06-24 RX ADMIN — TRANEXAMIC ACID 1000 MG: 10 INJECTION, SOLUTION INTRAVENOUS at 23:41

## 2024-06-25 ENCOUNTER — APPOINTMENT (INPATIENT)
Dept: RADIOLOGY | Facility: HOSPITAL | Age: 59
DRG: 480 | End: 2024-06-25
Payer: MEDICARE

## 2024-06-25 ENCOUNTER — ANESTHESIA EVENT (INPATIENT)
Dept: PERIOP | Facility: HOSPITAL | Age: 59
DRG: 480 | End: 2024-06-25
Payer: MEDICARE

## 2024-06-25 ENCOUNTER — ANESTHESIA (INPATIENT)
Dept: PERIOP | Facility: HOSPITAL | Age: 59
DRG: 480 | End: 2024-06-25
Payer: MEDICARE

## 2024-06-25 PROBLEM — M87.052 AVASCULAR NECROSIS OF FEMUR HEAD, LEFT (HCC): Status: ACTIVE | Noted: 2024-06-25

## 2024-06-25 PROBLEM — S72.145A CLOSED NONDISPLACED INTERTROCHANTERIC FRACTURE OF LEFT FEMUR (HCC): Status: ACTIVE | Noted: 2024-06-25

## 2024-06-25 LAB
ALBUMIN SERPL BCG-MCNC: 3.5 G/DL (ref 3.5–5)
ALP SERPL-CCNC: 98 U/L (ref 34–104)
ALT SERPL W P-5'-P-CCNC: 16 U/L (ref 7–52)
ANION GAP SERPL CALCULATED.3IONS-SCNC: 12 MMOL/L (ref 4–13)
AST SERPL W P-5'-P-CCNC: 45 U/L (ref 13–39)
BILIRUB SERPL-MCNC: 0.79 MG/DL (ref 0.2–1)
BUN SERPL-MCNC: 13 MG/DL (ref 5–25)
CALCIUM SERPL-MCNC: 9.1 MG/DL (ref 8.4–10.2)
CHLORIDE SERPL-SCNC: 108 MMOL/L (ref 96–108)
CO2 SERPL-SCNC: 20 MMOL/L (ref 21–32)
CREAT SERPL-MCNC: 0.39 MG/DL (ref 0.6–1.3)
ERYTHROCYTE [DISTWIDTH] IN BLOOD BY AUTOMATED COUNT: 16.5 % (ref 11.6–15.1)
GFR SERPL CREATININE-BSD FRML MDRD: 114 ML/MIN/1.73SQ M
GLUCOSE SERPL-MCNC: 98 MG/DL (ref 65–140)
HCT VFR BLD AUTO: 34.3 % (ref 34.8–46.1)
HGB BLD-MCNC: 11.4 G/DL (ref 11.5–15.4)
MAGNESIUM SERPL-MCNC: 1.6 MG/DL (ref 1.9–2.7)
MCH RBC QN AUTO: 32.1 PG (ref 26.8–34.3)
MCHC RBC AUTO-ENTMCNC: 33.2 G/DL (ref 31.4–37.4)
MCV RBC AUTO: 97 FL (ref 82–98)
PLATELET # BLD AUTO: 209 THOUSANDS/UL (ref 149–390)
PMV BLD AUTO: 10.1 FL (ref 8.9–12.7)
POTASSIUM SERPL-SCNC: 3.4 MMOL/L (ref 3.5–5.3)
PROT SERPL-MCNC: 6.6 G/DL (ref 6.4–8.4)
RBC # BLD AUTO: 3.55 MILLION/UL (ref 3.81–5.12)
SODIUM SERPL-SCNC: 140 MMOL/L (ref 135–147)
WBC # BLD AUTO: 8.7 THOUSAND/UL (ref 4.31–10.16)

## 2024-06-25 PROCEDURE — C1713 ANCHOR/SCREW BN/BN,TIS/BN: HCPCS | Performed by: ORTHOPAEDIC SURGERY

## 2024-06-25 PROCEDURE — 94762 N-INVAS EAR/PLS OXIMTRY CONT: CPT

## 2024-06-25 PROCEDURE — 80053 COMPREHEN METABOLIC PANEL: CPT | Performed by: STUDENT IN AN ORGANIZED HEALTH CARE EDUCATION/TRAINING PROGRAM

## 2024-06-25 PROCEDURE — 99223 1ST HOSP IP/OBS HIGH 75: CPT | Performed by: STUDENT IN AN ORGANIZED HEALTH CARE EDUCATION/TRAINING PROGRAM

## 2024-06-25 PROCEDURE — 99223 1ST HOSP IP/OBS HIGH 75: CPT | Performed by: ORTHOPAEDIC SURGERY

## 2024-06-25 PROCEDURE — 73502 X-RAY EXAM HIP UNI 2-3 VIEWS: CPT

## 2024-06-25 PROCEDURE — 85027 COMPLETE CBC AUTOMATED: CPT | Performed by: STUDENT IN AN ORGANIZED HEALTH CARE EDUCATION/TRAINING PROGRAM

## 2024-06-25 PROCEDURE — 27245 TREAT THIGH FRACTURE: CPT | Performed by: ORTHOPAEDIC SURGERY

## 2024-06-25 PROCEDURE — 0QS706Z REPOSITION LEFT UPPER FEMUR WITH INTRAMEDULLARY INTERNAL FIXATION DEVICE, OPEN APPROACH: ICD-10-PCS | Performed by: ORTHOPAEDIC SURGERY

## 2024-06-25 PROCEDURE — 83735 ASSAY OF MAGNESIUM: CPT | Performed by: STUDENT IN AN ORGANIZED HEALTH CARE EDUCATION/TRAINING PROGRAM

## 2024-06-25 PROCEDURE — C1769 GUIDE WIRE: HCPCS | Performed by: ORTHOPAEDIC SURGERY

## 2024-06-25 DEVICE — TFNA FENESTRATED HELICAL BLADE 90MM - STERILE
Type: IMPLANTABLE DEVICE | Site: TROCHANTER | Status: FUNCTIONAL
Brand: TFN-ADVANCE

## 2024-06-25 DEVICE — LOCKING SCREW FOR IM NAIL Ø 5MM/ 34MM/ XL25/ STERILE: Type: IMPLANTABLE DEVICE | Site: TROCHANTER | Status: FUNCTIONAL

## 2024-06-25 DEVICE — 11MM/130 DEG TI CANN TFNA 170MM - STERILE
Type: IMPLANTABLE DEVICE | Site: TROCHANTER | Status: FUNCTIONAL
Brand: TFN-ADVANCE

## 2024-06-25 RX ORDER — SODIUM CHLORIDE, SODIUM LACTATE, POTASSIUM CHLORIDE, CALCIUM CHLORIDE 600; 310; 30; 20 MG/100ML; MG/100ML; MG/100ML; MG/100ML
100 INJECTION, SOLUTION INTRAVENOUS CONTINUOUS
Status: DISCONTINUED | OUTPATIENT
Start: 2024-06-25 | End: 2024-06-25

## 2024-06-25 RX ORDER — POTASSIUM CHLORIDE 20 MEQ/1
20 TABLET, EXTENDED RELEASE ORAL ONCE
Status: COMPLETED | OUTPATIENT
Start: 2024-06-25 | End: 2024-06-25

## 2024-06-25 RX ORDER — NICOTINE 21 MG/24HR
1 PATCH, TRANSDERMAL 24 HOURS TRANSDERMAL DAILY
Status: DISCONTINUED | OUTPATIENT
Start: 2024-06-25 | End: 2024-07-03 | Stop reason: HOSPADM

## 2024-06-25 RX ORDER — CEFAZOLIN SODIUM 2 G/50ML
2000 SOLUTION INTRAVENOUS
Status: COMPLETED | OUTPATIENT
Start: 2024-06-26 | End: 2024-06-25

## 2024-06-25 RX ORDER — FENTANYL CITRATE/PF 50 MCG/ML
50 SYRINGE (ML) INJECTION
Status: DISCONTINUED | OUTPATIENT
Start: 2024-06-25 | End: 2024-06-25 | Stop reason: HOSPADM

## 2024-06-25 RX ORDER — HYDROMORPHONE HCL/PF 1 MG/ML
0.5 SYRINGE (ML) INJECTION EVERY 4 HOURS PRN
Status: DISCONTINUED | OUTPATIENT
Start: 2024-06-25 | End: 2024-07-03 | Stop reason: HOSPADM

## 2024-06-25 RX ORDER — MORPHINE SULFATE 4 MG/ML
4 INJECTION, SOLUTION INTRAMUSCULAR; INTRAVENOUS EVERY 6 HOURS PRN
Status: DISCONTINUED | OUTPATIENT
Start: 2024-06-25 | End: 2024-06-25

## 2024-06-25 RX ORDER — ROCURONIUM BROMIDE 10 MG/ML
INJECTION, SOLUTION INTRAVENOUS AS NEEDED
Status: DISCONTINUED | OUTPATIENT
Start: 2024-06-25 | End: 2024-06-25

## 2024-06-25 RX ORDER — ONDANSETRON 2 MG/ML
4 INJECTION INTRAMUSCULAR; INTRAVENOUS EVERY 6 HOURS PRN
Status: DISCONTINUED | OUTPATIENT
Start: 2024-06-25 | End: 2024-06-29

## 2024-06-25 RX ORDER — OXYCODONE HYDROCHLORIDE 5 MG/1
5 TABLET ORAL EVERY 4 HOURS PRN
Status: DISCONTINUED | OUTPATIENT
Start: 2024-06-25 | End: 2024-07-03 | Stop reason: HOSPADM

## 2024-06-25 RX ORDER — FOLIC ACID 1 MG/1
1 TABLET ORAL DAILY
Status: DISCONTINUED | OUTPATIENT
Start: 2024-06-25 | End: 2024-07-03 | Stop reason: HOSPADM

## 2024-06-25 RX ORDER — FENTANYL CITRATE 50 UG/ML
INJECTION, SOLUTION INTRAMUSCULAR; INTRAVENOUS AS NEEDED
Status: DISCONTINUED | OUTPATIENT
Start: 2024-06-25 | End: 2024-06-25

## 2024-06-25 RX ORDER — HYDROMORPHONE HCL/PF 1 MG/ML
0.2 SYRINGE (ML) INJECTION
Status: DISCONTINUED | OUTPATIENT
Start: 2024-06-25 | End: 2024-06-25 | Stop reason: HOSPADM

## 2024-06-25 RX ORDER — ONDANSETRON 2 MG/ML
INJECTION INTRAMUSCULAR; INTRAVENOUS AS NEEDED
Status: DISCONTINUED | OUTPATIENT
Start: 2024-06-25 | End: 2024-06-25

## 2024-06-25 RX ORDER — POLYETHYLENE GLYCOL 3350 17 G/17G
17 POWDER, FOR SOLUTION ORAL DAILY PRN
Status: DISCONTINUED | OUTPATIENT
Start: 2024-06-25 | End: 2024-07-03 | Stop reason: HOSPADM

## 2024-06-25 RX ORDER — LIDOCAINE HYDROCHLORIDE 10 MG/ML
INJECTION, SOLUTION EPIDURAL; INFILTRATION; INTRACAUDAL; PERINEURAL AS NEEDED
Status: DISCONTINUED | OUTPATIENT
Start: 2024-06-25 | End: 2024-06-25

## 2024-06-25 RX ORDER — SODIUM CHLORIDE, SODIUM LACTATE, POTASSIUM CHLORIDE, CALCIUM CHLORIDE 600; 310; 30; 20 MG/100ML; MG/100ML; MG/100ML; MG/100ML
125 INJECTION, SOLUTION INTRAVENOUS CONTINUOUS
Status: DISCONTINUED | OUTPATIENT
Start: 2024-06-25 | End: 2024-06-26

## 2024-06-25 RX ORDER — LANOLIN ALCOHOL/MO/W.PET/CERES
100 CREAM (GRAM) TOPICAL DAILY
Status: DISCONTINUED | OUTPATIENT
Start: 2024-06-25 | End: 2024-07-03 | Stop reason: HOSPADM

## 2024-06-25 RX ORDER — ENOXAPARIN SODIUM 100 MG/ML
40 INJECTION SUBCUTANEOUS DAILY
Status: DISCONTINUED | OUTPATIENT
Start: 2024-06-25 | End: 2024-06-25

## 2024-06-25 RX ORDER — CEFAZOLIN SODIUM 2 G/50ML
2000 SOLUTION INTRAVENOUS EVERY 8 HOURS
Status: COMPLETED | OUTPATIENT
Start: 2024-06-26 | End: 2024-06-26

## 2024-06-25 RX ORDER — PROPOFOL 10 MG/ML
INJECTION, EMULSION INTRAVENOUS AS NEEDED
Status: DISCONTINUED | OUTPATIENT
Start: 2024-06-25 | End: 2024-06-25

## 2024-06-25 RX ORDER — ACETAMINOPHEN 325 MG/1
975 TABLET ORAL EVERY 8 HOURS SCHEDULED
Status: DISCONTINUED | OUTPATIENT
Start: 2024-06-26 | End: 2024-07-03 | Stop reason: HOSPADM

## 2024-06-25 RX ORDER — HYDROMORPHONE HCL/PF 1 MG/ML
SYRINGE (ML) INJECTION AS NEEDED
Status: DISCONTINUED | OUTPATIENT
Start: 2024-06-25 | End: 2024-06-25

## 2024-06-25 RX ORDER — LORAZEPAM 2 MG/ML
2 INJECTION INTRAMUSCULAR ONCE
Status: COMPLETED | OUTPATIENT
Start: 2024-06-25 | End: 2024-06-25

## 2024-06-25 RX ORDER — MAGNESIUM SULFATE HEPTAHYDRATE 40 MG/ML
2 INJECTION, SOLUTION INTRAVENOUS ONCE
Status: COMPLETED | OUTPATIENT
Start: 2024-06-25 | End: 2024-06-25

## 2024-06-25 RX ORDER — TRANEXAMIC ACID 10 MG/ML
1000 INJECTION, SOLUTION INTRAVENOUS
Status: DISCONTINUED | OUTPATIENT
Start: 2024-06-26 | End: 2024-06-25 | Stop reason: HOSPADM

## 2024-06-25 RX ORDER — MIDAZOLAM HYDROCHLORIDE 2 MG/2ML
INJECTION, SOLUTION INTRAMUSCULAR; INTRAVENOUS AS NEEDED
Status: DISCONTINUED | OUTPATIENT
Start: 2024-06-25 | End: 2024-06-25

## 2024-06-25 RX ORDER — ACETAMINOPHEN 325 MG/1
975 TABLET ORAL EVERY 8 HOURS PRN
Status: DISCONTINUED | OUTPATIENT
Start: 2024-06-25 | End: 2024-06-25

## 2024-06-25 RX ORDER — ENOXAPARIN SODIUM 100 MG/ML
40 INJECTION SUBCUTANEOUS DAILY
Status: DISCONTINUED | OUTPATIENT
Start: 2024-06-26 | End: 2024-07-03 | Stop reason: HOSPADM

## 2024-06-25 RX ORDER — ONDANSETRON 2 MG/ML
4 INJECTION INTRAMUSCULAR; INTRAVENOUS ONCE AS NEEDED
Status: DISCONTINUED | OUTPATIENT
Start: 2024-06-25 | End: 2024-06-25 | Stop reason: HOSPADM

## 2024-06-25 RX ADMIN — ROCURONIUM BROMIDE 50 MG: 10 INJECTION, SOLUTION INTRAVENOUS at 18:02

## 2024-06-25 RX ADMIN — SODIUM CHLORIDE, SODIUM LACTATE, POTASSIUM CHLORIDE, AND CALCIUM CHLORIDE 100 ML/HR: .6; .31; .03; .02 INJECTION, SOLUTION INTRAVENOUS at 01:31

## 2024-06-25 RX ADMIN — THIAMINE HCL TAB 100 MG 100 MG: 100 TAB at 08:02

## 2024-06-25 RX ADMIN — SUGAMMADEX 200 MG: 100 INJECTION, SOLUTION INTRAVENOUS at 18:48

## 2024-06-25 RX ADMIN — ONDANSETRON 4 MG: 2 INJECTION INTRAMUSCULAR; INTRAVENOUS at 18:48

## 2024-06-25 RX ADMIN — ACETAMINOPHEN 975 MG: 325 TABLET, FILM COATED ORAL at 08:02

## 2024-06-25 RX ADMIN — SODIUM CHLORIDE, SODIUM LACTATE, POTASSIUM CHLORIDE, AND CALCIUM CHLORIDE 125 ML/HR: .6; .31; .03; .02 INJECTION, SOLUTION INTRAVENOUS at 17:40

## 2024-06-25 RX ADMIN — LORAZEPAM 2 MG: 2 INJECTION INTRAMUSCULAR; INTRAVENOUS at 11:13

## 2024-06-25 RX ADMIN — MORPHINE SULFATE 4 MG: 4 INJECTION INTRAVENOUS at 00:10

## 2024-06-25 RX ADMIN — KETOROLAC TROMETHAMINE 15 MG: 30 INJECTION, SOLUTION INTRAMUSCULAR; INTRAVENOUS at 00:10

## 2024-06-25 RX ADMIN — FENTANYL CITRATE 50 MCG: 50 INJECTION INTRAMUSCULAR; INTRAVENOUS at 18:02

## 2024-06-25 RX ADMIN — HYDROMORPHONE HYDROCHLORIDE 0.5 MG: 1 INJECTION, SOLUTION INTRAMUSCULAR; INTRAVENOUS; SUBCUTANEOUS at 18:59

## 2024-06-25 RX ADMIN — MULTIPLE VITAMINS W/ MINERALS TAB 1 TABLET: TAB ORAL at 08:02

## 2024-06-25 RX ADMIN — POTASSIUM CHLORIDE 20 MEQ: 1500 TABLET, EXTENDED RELEASE ORAL at 11:01

## 2024-06-25 RX ADMIN — MAGNESIUM SULFATE HEPTAHYDRATE 2 G: 40 INJECTION, SOLUTION INTRAVENOUS at 11:01

## 2024-06-25 RX ADMIN — FENTANYL CITRATE 50 MCG: 50 INJECTION INTRAMUSCULAR; INTRAVENOUS at 18:29

## 2024-06-25 RX ADMIN — FOLIC ACID 1 MG: 1 TABLET ORAL at 08:02

## 2024-06-25 RX ADMIN — PROPOFOL 150 MG: 10 INJECTION, EMULSION INTRAVENOUS at 18:02

## 2024-06-25 RX ADMIN — MIDAZOLAM 2 MG: 1 INJECTION INTRAMUSCULAR; INTRAVENOUS at 17:52

## 2024-06-25 RX ADMIN — LIDOCAINE HYDROCHLORIDE 60 MG: 10 INJECTION, SOLUTION EPIDURAL; INFILTRATION; INTRACAUDAL; PERINEURAL at 18:02

## 2024-06-25 RX ADMIN — CEFAZOLIN SODIUM 2000 MG: 2 SOLUTION INTRAVENOUS at 17:51

## 2024-06-25 RX ADMIN — ENOXAPARIN SODIUM 40 MG: 40 INJECTION SUBCUTANEOUS at 08:03

## 2024-06-25 NOTE — ANESTHESIA POSTPROCEDURE EVALUATION
"Post-Op Assessment Note    CV Status:  Stable    Pain management: adequate       Mental Status:  Alert and awake   Hydration Status:  Euvolemic   PONV Controlled:  Controlled   Airway Patency:  Patent  Airway: intubated     Post Op Vitals Reviewed: Yes    No anethesia notable event occurred.    Staff: CRNA               BP      Temp      Pulse     Resp      SpO2      /71 (BP Location: Left arm)   Pulse 71   Temp 97.7 °F (36.5 °C) (Temporal)   Resp 16   Ht 5' 7\" (1.702 m)   Wt 53.3 kg (117 lb 8.1 oz)   SpO2 96%   BMI 18.40 kg/m²     "

## 2024-06-25 NOTE — OCCUPATIONAL THERAPY NOTE
Occupational Therapy Cancellation        Patient Name: Alyson Madera  Today's Date: 6/25/2024 06/25/24 1059   OT Last Visit   OT Visit Date 06/25/24   Note Type   Note type Cancelled Session;Evaluation   Cancel Reasons Medical status   Additional Comments OT Consult received. Chart reviewed. Pt with closed nondisplaced intertrochanteric fracture and avascular necrosis of left hip. Ortho pending. Hold OT at this time until ortho intervention. Will continue to follow.     Consuelo Vazquez, OT

## 2024-06-25 NOTE — PROGRESS NOTES
Formerly [facility], later [facility]  Progress Note  Name: Alyson Madera I  MRN: [id number]  Unit/Bed#: E2 -01 I Date of Admission: 6/24/2024   Date of Service: 6/25/2024 I Hospital Day: 1    Assessment & Plan   * Closed nondisplaced intertrochanteric fracture of left femur (HCC)  Assessment & Plan  Patient found to have closed nondisplaced intertrochanteric fracture and avascular necrosis of left hip  Ortho consult placed  Unfortunately patient ate breakfast this morning, will follow-up orthopedics plan for OR  Does have history of alcohol abuse but does not appear in active withdrawal  C/w pain medications as needed, adjust as needed  Postoperative PT/OT    Avascular necrosis of femur head, left (HCC)  Assessment & Plan  Ortho consult placed. Recs to follow    Alcohol use disorder, severe, dependence ([facility])  Assessment & Plan  Last drink 6/24  Continue CIWA protocol  No evidence of active withdrawal  Can start Librium if she develops worsening symptoms    Schizoaffective disorder, bipolar type ([facility])  Assessment & Plan  Has not been taking any of her outpatient medications for over 6 months             VTE Pharmacologic Prophylaxis:   on hold for OR today     Mobility:   Basic Mobility Inpatient Raw Score: 9  JH-HLM Goal: 3: Sit at edge of bed  JH-HLM Achieved: 2: Bed activities/Dependent transfer  JH-HLM Goal NOT achieved. Continue with multidisciplinary rounding and encourage appropriate mobility to improve upon JH-HLM goals.    Patient Centered Rounds: I performed bedside rounds with nursing staff today.   Discussions with Specialists or Other Care Team Provider: ortho      Total Time Spent on Date of Encounter in care of patient:  This time was spent on one or more of the following: performing physical exam; counseling and coordination of care; obtaining or reviewing history; documenting in the medical record; reviewing/ordering tests, medications or procedures; communicating with other healthcare  professionals and discussing with patient's family/caregivers.    Current Length of Stay: 1 day(s)  Current Patient Status: Inpatient   Certification Statement: The patient will continue to require additional inpatient hospital stay due to hip fracture  Discharge Plan: Anticipate discharge in 48-72 hrs to discharge location to be determined pending rehab evaluations.    Code Status: Level 3 - DNAR and DNI    Subjective:   Patient seen and examined at bedside. Notes she is having pain in her leg today. Having difficulty lifting that leg off the bed. Denies any shortness of breath or chest pain.    Objective:     Vitals:   Temp (24hrs), Av.9 °F (36.6 °C), Min:97.1 °F (36.2 °C), Max:98.5 °F (36.9 °C)    Temp:  [97.1 °F (36.2 °C)-98.5 °F (36.9 °C)] 98.5 °F (36.9 °C)  HR:  [72-88] 78  Resp:  [16-18] 16  BP: ()/(52-64) 109/59  SpO2:  [95 %-99 %] 96 %  Body mass index is 18.4 kg/m².     Input and Output Summary (last 24 hours):     Intake/Output Summary (Last 24 hours) at 2024 1043  Last data filed at 2024 0819  Gross per 24 hour   Intake 3301 ml   Output 50 ml   Net 3251 ml       Physical Exam:   Physical Exam  Vitals reviewed.   Constitutional:       General: She is not in acute distress.  HENT:      Head: Normocephalic and atraumatic.   Eyes:      General: No scleral icterus.     Conjunctiva/sclera: Conjunctivae normal.   Cardiovascular:      Rate and Rhythm: Normal rate and regular rhythm.      Heart sounds: No murmur heard.  Pulmonary:      Effort: Pulmonary effort is normal. No respiratory distress.      Breath sounds: Normal breath sounds. No wheezing.   Abdominal:      General: Bowel sounds are normal. There is no distension.      Palpations: Abdomen is soft.      Tenderness: There is no abdominal tenderness.   Musculoskeletal:         General: Tenderness (Hip) present.      Cervical back: Neck supple.      Right lower leg: No edema.      Left lower leg: No edema.   Skin:     General: Skin is  warm and dry.   Neurological:      Mental Status: She is alert and oriented to person, place, and time.   Psychiatric:         Mood and Affect: Mood normal.         Behavior: Behavior normal.          Additional Data:     Labs:  Results from last 7 days   Lab Units 06/25/24  0456 06/24/24  2150   WBC Thousand/uL 8.70 9.92   HEMOGLOBIN g/dL 11.4* 12.7   HEMATOCRIT % 34.3* 38.2   PLATELETS Thousands/uL 209 304   SEGS PCT %  --  72   LYMPHO PCT %  --  17   MONO PCT %  --  8   EOS PCT %  --  1     Results from last 7 days   Lab Units 06/25/24  0456   SODIUM mmol/L 140   POTASSIUM mmol/L 3.4*   CHLORIDE mmol/L 108   CO2 mmol/L 20*   BUN mg/dL 13   CREATININE mg/dL 0.39*   ANION GAP mmol/L 12   CALCIUM mg/dL 9.1   ALBUMIN g/dL 3.5   TOTAL BILIRUBIN mg/dL 0.79   ALK PHOS U/L 98   ALT U/L 16   AST U/L 45*   GLUCOSE RANDOM mg/dL 98     Results from last 7 days   Lab Units 06/24/24  2150   INR  1.02                   Lines/Drains:  Invasive Devices       Peripheral Intravenous Line  Duration             Peripheral IV 06/24/24 Left;Ventral (anterior) Forearm <1 day                          Imaging: Reviewed radiology reports from this admission including: Hip CT    Recent Cultures (last 7 days):         Last 24 Hours Medication List:   Current Facility-Administered Medications   Medication Dose Route Frequency Provider Last Rate    acetaminophen  975 mg Oral Q8H PRN Kt Szymnaski MD      enoxaparin  40 mg Subcutaneous Daily Kt Szymanski MD      folic acid  1 mg Oral Daily Kt Szymanski MD      lactated ringers  100 mL/hr Intravenous Continuous Kt Szymanski MD Stopped (06/25/24 0750)    magnesium sulfate  2 g Intravenous Once Makenzie Simpson PA-C      morphine injection  2 mg Intravenous Q6H PRN Kt Szymanski MD      morphine injection  4 mg Intravenous Q6H PRN Kt Szymanski MD      multivitamin-minerals  1 tablet Oral Daily Kt Szymanski MD      nicotine  1 patch Transdermal Daily Kt Szymanski MD      ondansetron  4 mg  Intravenous Q6H PRN Kt Szymanski MD      polyethylene glycol  17 g Oral Daily PRN Kt Szymanski MD      potassium chloride  20 mEq Oral Once Makenzie Simpson PA-C      thiamine  100 mg Oral Daily Kt Szymanski MD          Today, Patient Was Seen By: Makenzie Simpson PA-C    **Please Note: This note may have been constructed using a voice recognition system.**

## 2024-06-25 NOTE — DISCHARGE INSTR - OTHER ORDERS
AA meeting guide   [email or web address].org/find-[email or web address]    [facility]  [address], [address]st [address]  [place]  [id number]  [phone]    [facility] [place]  [address]  [place]  [id number]  [phone]    Stephanie Knottman  Certified     [facility]  [phone]    [facility]  [address] [place] [address]  [place]  [id number]  [phone]

## 2024-06-25 NOTE — DISCHARGE INSTR - AVS FIRST PAGE
HIP FRACTURE DISCHARGE INSTRUCTIONS    Surgical Dressing:  You may remove your dressing 7 days from the date of your surgery then change your dressing daily until drainage stops. Do not put any lotions or creams on your incision.  If there are any signs of infection such as drainage persisting beyond a few days, unusual looking drainage (yellow, green), increased redness around the incision, or fever/chills let your doctor know.      Medications:  Upon discharge you will be given a prescription for an anticoagulant (i.e. Ecotrin (Aspirin), Coumadin (Warfarin), Lovenox (Enoxaparin)) and an opioid pain reliever.  Do not take any over the counter NSAIDs (i.e. Ibuprofen, Motrin, Advil, Naprosyn, Aleve) while on your anticoagulation medication.  Opioid pain relievers cannot be refilled over the phone.  Please be mindful of the number of pills you have left so you can  a prescription for a refill if needed during office hours.        Walking:  You may weight bear as tolerated.  Use two crutches or a walker for EVERY step.  Gradually increase your walking daily.  You can progress to a cane as tolerated once advised by your physical therapist.      Bathing:  No tub baths.  Do not submerge your incision.  You may shower and let water run over your incisions 1 week after surgery.  You may sit on a shower chair or stand and shower briefly.  Use your crutches/walker to get in and out of the shower.      Driving:  You may ride as a passenger now.  No driving until your follow-up appointment.  To get into the car use the front passenger seat with the seat pushed back as far as possible.  Scoot yourself back in the seat.  Use your hands to assist your legs into the car.      Physical therapy:  When you have finished with home visiting PT, you may begin outpatient PT.  Call your surgeon’s office if you have not already received a prescription.  A prescription can be faxed to the outpatient center of your choice.      Special  considerations:  To minimize swelling, stiffness, and decrease pain use cold as needed, but not heat.  Ice 20 minutes at a time with a cloth between your skin and the ice.  Ice after walking, when you have pain, or after you have completed your exercises.  Limit your sitting to 60 minutes at one time.      Follow up:  Call [phone] to make an appointment to see your surgeon within 2 weeks of your surgery if you haven’t done so already.  If you have any questions during business hours please call the direct office phone number [phone].  Otherwise please call [phone] for any concerns.

## 2024-06-25 NOTE — PLAN OF CARE
Problem: Prexisting or High Potential for Compromised Skin Integrity  Goal: Skin integrity is maintained or improved  Description: INTERVENTIONS:  - Identify patients at risk for skin breakdown  - Assess and monitor skin integrity  - Assess and monitor nutrition and hydration status  - Monitor labs   - Assess for incontinence   - Turn and reposition patient  - Assist with mobility/ambulation  - Relieve pressure over bony prominences  - Avoid friction and shearing  - Provide appropriate hygiene as needed including keeping skin clean and dry  - Evaluate need for skin moisturizer/barrier cream  - Collaborate with interdisciplinary team   - Patient/family teaching  - Consider wound care consult   Outcome: Progressing     Problem: COPING  Goal: Pt/Family able to verbalize concerns and demonstrate effective coping strategies  Description: INTERVENTIONS:  - Assist patient/family to identify coping skills, available support systems and cultural and spiritual values  - Provide emotional support, including active listening and acknowledgement of concerns of patient and caregivers  - Reduce environmental stimuli, as able  - Provide patient education  - Assess for spiritual pain/suffering and initiate spiritual care, including notification of Pastoral Care or audra based community as needed  - Assess effectiveness of coping strategies  Outcome: Progressing  Goal: Will report anxiety at manageable levels  Description: INTERVENTIONS:  - Administer medication as ordered  - Teach and encourage coping skills  - Provide emotional support  - Assess patient/family for anxiety and ability to cope  Outcome: Progressing     Problem: SUBSTANCE USE/ABUSE  Goal: Will have no detox symptoms and will verbalize plan for changing substance-related behavior  Description: INTERVENTIONS:  - Monitor physical status and assess for symptoms of withdrawal  - Administer medication as ordered  - Provide emotional support with 1 on 1 interaction with  staff  - Encourage recovery focused program/ addiction education  - Assess for verbalization of changing behaviors related to substance abuse  - Initiate consults and referrals as appropriate (Case Management, Spiritual Care, etc.)  Outcome: Progressing  Goal: By discharge, will develop insight into their chemical dependency and sustain motivation to continue in recovery  Description: INTERVENTIONS:  - Attends all daily group sessions and scheduled AA groups  - Actively practices coping skills through participation in the therapeutic community and adherence to program rules  - Reviews and completes assignments from individual treatment plan  - Assist patient development of understanding of their personal cycle of addiction and relapse triggers  Outcome: Progressing  Goal: By discharge, patient will have ongoing treatment plan addressing chemical dependency  Description: INTERVENTIONS:  - Assist patient with resources and/or appointments for ongoing recovery based living  Outcome: Progressing

## 2024-06-25 NOTE — ASSESSMENT & PLAN NOTE
Last drink 6/24  Continue CIWA protocol  No evidence of active withdrawal  Can start Librium if she develops worsening symptoms

## 2024-06-25 NOTE — ASSESSMENT & PLAN NOTE
Last drink 6/24  Stated on CIWA protocol  High risk for withdrawal seizure  C/w Thiamin and folic acid

## 2024-06-25 NOTE — ANESTHESIA PREPROCEDURE EVALUATION
Procedure:  INSERTION NAIL IM FEMUR ANTEGRADE (TROCHANTERIC) (Left: Leg Upper)    Relevant Problems   CARDIO   (+) Hyperlipidemia      ENDO   (+) Hypothyroidism      GI/HEPATIC   (+) Alcoholic cirrhosis of liver without ascites (HCC)   (+) GI bleed   (+) Hematemesis      HEMATOLOGY   (+) Acute blood loss anemia   (+) Symptomatic anemia   (+) Thrombocytopenia (HCC)      PULMONARY   (+) Chronic obstructive pulmonary disease, unspecified COPD type (HCC)      Behavioral Health   (+) Schizoaffective disorder, bipolar type (HCC)      Orthopedic/Musculoskeletal   (+) Closed nondisplaced intertrochanteric fracture of left femur (HCC)      Other   (+) Alcohol use disorder, severe, dependence (HCC)        Physical Exam    Airway    Mallampati score: II         Dental       Cardiovascular  Cardiovascular exam normal    Pulmonary  Pulmonary exam normal     Other Findings  post-pubertal.      Anesthesia Plan  ASA Score- 3     Anesthesia Type- general with ASA Monitors.         Additional Monitors:     Airway Plan: ETT.           Plan Factors-    Chart reviewed.        Patient is a current smoker.  Patient instructed to abstain from smoking on day of procedure. Patient did not smoke on day of surgery.            Induction- intravenous.    Postoperative Plan-         Informed Consent- Anesthetic plan and risks discussed with patient.

## 2024-06-25 NOTE — PLAN OF CARE
Problem: Potential for Falls  Goal: Patient will remain free of falls  Description: INTERVENTIONS:  - Educate patient/family on patient safety including physical limitations  - Instruct patient to call for assistance with activity   - Consult OT/PT to assist with strengthening/mobility   - Keep Call bell within reach  - Keep bed low and locked with side rails adjusted as appropriate  - Keep care items and personal belongings within reach  - Initiate and maintain comfort rounds  - Make Fall Risk Sign visible to staff  - Offer Toileting every 2 Hours, in advance of need  - Initiate/Maintain bed alarm  - Obtain necessary fall risk management equipment:   - Apply yellow socks and bracelet for high fall risk patients  - Consider moving patient to room near nurses station  Outcome: Progressing     Problem: Prexisting or High Potential for Compromised Skin Integrity  Goal: Skin integrity is maintained or improved  Description: INTERVENTIONS:  - Identify patients at risk for skin breakdown  - Assess and monitor skin integrity  - Assess and monitor nutrition and hydration status  - Monitor labs   - Assess for incontinence   - Turn and reposition patient  - Assist with mobility/ambulation  - Relieve pressure over bony prominences  - Avoid friction and shearing  - Provide appropriate hygiene as needed including keeping skin clean and dry  - Evaluate need for skin moisturizer/barrier cream  - Collaborate with interdisciplinary team   - Patient/family teaching  - Consider wound care consult   Outcome: Progressing     Problem: COPING  Goal: Pt/Family able to verbalize concerns and demonstrate effective coping strategies  Description: INTERVENTIONS:  - Assist patient/family to identify coping skills, available support systems and cultural and spiritual values  - Provide emotional support, including active listening and acknowledgement of concerns of patient and caregivers  - Reduce environmental stimuli, as able  - Provide  patient education  - Assess for spiritual pain/suffering and initiate spiritual care, including notification of Pastoral Care or audra based community as needed  - Assess effectiveness of coping strategies  Outcome: Progressing  Goal: Will report anxiety at manageable levels  Description: INTERVENTIONS:  - Administer medication as ordered  - Teach and encourage coping skills  - Provide emotional support  - Assess patient/family for anxiety and ability to cope  Outcome: Progressing     Problem: SUBSTANCE USE/ABUSE  Goal: Will have no detox symptoms and will verbalize plan for changing substance-related behavior  Description: INTERVENTIONS:  - Monitor physical status and assess for symptoms of withdrawal  - Administer medication as ordered  - Provide emotional support with 1 on 1 interaction with staff  - Encourage recovery focused program/ addiction education  - Assess for verbalization of changing behaviors related to substance abuse  - Initiate consults and referrals as appropriate (Case Management, Spiritual Care, etc.)  Outcome: Progressing  Goal: By discharge, will develop insight into their chemical dependency and sustain motivation to continue in recovery  Description: INTERVENTIONS:  - Attends all daily group sessions and scheduled AA groups  - Actively practices coping skills through participation in the therapeutic community and adherence to program rules  - Reviews and completes assignments from individual treatment plan  - Assist patient development of understanding of their personal cycle of addiction and relapse triggers  Outcome: Progressing  Goal: By discharge, patient will have ongoing treatment plan addressing chemical dependency  Description: INTERVENTIONS:  - Assist patient with resources and/or appointments for ongoing recovery based living  Outcome: Progressing

## 2024-06-25 NOTE — ASSESSMENT & PLAN NOTE
Ortho consult placed  Unlikely urgent for OR given current alcohol intoxication and possible withdrawal and need for CIWA protocol  C/w pain medications as needed, adjust as needed  PT/OT ordered.
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
## 2024-06-25 NOTE — PHYSICAL THERAPY NOTE
PT EVALUATION    Pt. Name: Alyson Madera  Pt. Age: 59 y.o.  MRN: [id number]  LENGTH OF STAY: 1       06/25/24 1130   PT Last Visit   PT Visit Date 06/25/24   Note Type   Note type Cancelled Session   Cancel Reasons Medical status   Additional Comments PT Consult received. Chart reviewed. Pt with closed nondisplaced intertrochanteric fracture and avascular necrosis of left hip. Ortho pending. Hold PT at this time until ortho intervention. Will continue to follow.       Jackie Medina, PT

## 2024-06-25 NOTE — ASSESSMENT & PLAN NOTE
Patient found to have closed nondisplaced intertrochanteric fracture and avascular necrosis of left hip  Ortho consult placed  Unfortunately patient ate breakfast this morning, will follow-up orthopedics plan for OR  Does have history of alcohol abuse but does not appear in active withdrawal  C/w pain medications as needed, adjust as needed  Postoperative PT/OT
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
## 2024-06-26 ENCOUNTER — TELEPHONE (OUTPATIENT)
Dept: PSYCHIATRY | Facility: CLINIC | Age: 59
End: 2024-06-26

## 2024-06-26 LAB
ANION GAP SERPL CALCULATED.3IONS-SCNC: 7 MMOL/L (ref 4–13)
BUN SERPL-MCNC: 5 MG/DL (ref 5–25)
CALCIUM SERPL-MCNC: 8.7 MG/DL (ref 8.4–10.2)
CHLORIDE SERPL-SCNC: 103 MMOL/L (ref 96–108)
CO2 SERPL-SCNC: 25 MMOL/L (ref 21–32)
CREAT SERPL-MCNC: 0.49 MG/DL (ref 0.6–1.3)
ERYTHROCYTE [DISTWIDTH] IN BLOOD BY AUTOMATED COUNT: 16.2 % (ref 11.6–15.1)
GFR SERPL CREATININE-BSD FRML MDRD: 106 ML/MIN/1.73SQ M
GLUCOSE SERPL-MCNC: 165 MG/DL (ref 65–140)
HCT VFR BLD AUTO: 36.2 % (ref 34.8–46.1)
HGB BLD-MCNC: 12 G/DL (ref 11.5–15.4)
MCH RBC QN AUTO: 31.8 PG (ref 26.8–34.3)
MCHC RBC AUTO-ENTMCNC: 33.1 G/DL (ref 31.4–37.4)
MCV RBC AUTO: 96 FL (ref 82–98)
PLATELET # BLD AUTO: 170 THOUSANDS/UL (ref 149–390)
PMV BLD AUTO: 9.8 FL (ref 8.9–12.7)
POTASSIUM SERPL-SCNC: 3.7 MMOL/L (ref 3.5–5.3)
RBC # BLD AUTO: 3.77 MILLION/UL (ref 3.81–5.12)
SODIUM SERPL-SCNC: 135 MMOL/L (ref 135–147)
WBC # BLD AUTO: 6.39 THOUSAND/UL (ref 4.31–10.16)

## 2024-06-26 PROCEDURE — 97163 PT EVAL HIGH COMPLEX 45 MIN: CPT

## 2024-06-26 PROCEDURE — 85027 COMPLETE CBC AUTOMATED: CPT | Performed by: PHYSICIAN ASSISTANT

## 2024-06-26 PROCEDURE — 80048 BASIC METABOLIC PNL TOTAL CA: CPT | Performed by: PHYSICIAN ASSISTANT

## 2024-06-26 PROCEDURE — 99024 POSTOP FOLLOW-UP VISIT: CPT

## 2024-06-26 PROCEDURE — 97167 OT EVAL HIGH COMPLEX 60 MIN: CPT

## 2024-06-26 PROCEDURE — 99232 SBSQ HOSP IP/OBS MODERATE 35: CPT | Performed by: PHYSICIAN ASSISTANT

## 2024-06-26 PROCEDURE — G0426 INPT/ED TELECONSULT50: HCPCS | Performed by: PSYCHIATRY & NEUROLOGY

## 2024-06-26 RX ORDER — LORAZEPAM 2 MG/ML
2 INJECTION INTRAMUSCULAR EVERY 6 HOURS PRN
Status: DISCONTINUED | OUTPATIENT
Start: 2024-06-26 | End: 2024-06-29

## 2024-06-26 RX ADMIN — FOLIC ACID 1 MG: 1 TABLET ORAL at 08:00

## 2024-06-26 RX ADMIN — ACETAMINOPHEN 975 MG: 325 TABLET, FILM COATED ORAL at 06:10

## 2024-06-26 RX ADMIN — OXYCODONE HYDROCHLORIDE 5 MG: 5 TABLET ORAL at 00:01

## 2024-06-26 RX ADMIN — ACETAMINOPHEN 975 MG: 325 TABLET, FILM COATED ORAL at 00:01

## 2024-06-26 RX ADMIN — ACETAMINOPHEN 975 MG: 325 TABLET, FILM COATED ORAL at 21:16

## 2024-06-26 RX ADMIN — OXYCODONE HYDROCHLORIDE 5 MG: 5 TABLET ORAL at 06:10

## 2024-06-26 RX ADMIN — CEFAZOLIN SODIUM 2000 MG: 2 SOLUTION INTRAVENOUS at 02:14

## 2024-06-26 RX ADMIN — THIAMINE HCL TAB 100 MG 100 MG: 100 TAB at 08:00

## 2024-06-26 RX ADMIN — ENOXAPARIN SODIUM 40 MG: 40 INJECTION SUBCUTANEOUS at 08:01

## 2024-06-26 RX ADMIN — CEFAZOLIN SODIUM 2000 MG: 2 SOLUTION INTRAVENOUS at 10:17

## 2024-06-26 RX ADMIN — MULTIPLE VITAMINS W/ MINERALS TAB 1 TABLET: TAB ORAL at 08:00

## 2024-06-26 RX ADMIN — ACETAMINOPHEN 975 MG: 325 TABLET, FILM COATED ORAL at 13:07

## 2024-06-26 NOTE — PROGRESS NOTES
[facility]  Progress Note  Name: Alyson Madera I  MRN: [id number]  Unit/Bed#: E2 -01 I Date of Admission: 6/24/2024   Date of Service: 6/26/2024 I Hospital Day: 2    Assessment & Plan   * Closed nondisplaced intertrochanteric fracture of left femur (HCC)  Assessment & Plan  Patient found to have closed nondisplaced intertrochanteric fracture and avascular necrosis of left hip  Ortho consult placed  To the OR last night for fixation  Does have history of alcohol abuse but does not appear in active withdrawal  Continue as needed analgesics  Follow-up PT/OT evaluations, would likely need rehab    Avascular necrosis of femur head, left (HCC)  Assessment & Plan  Ortho consult placed. Recs to follow    Alcohol use disorder, severe, dependence (HCC)  Assessment & Plan  Last drink 6/24  Continue CIWA protocol  Remains free of withdrawal symptoms  Can start Librium if she develops worsening symptoms    Schizoaffective disorder, bipolar type (HCC)  Assessment & Plan  Has not been taking any of her outpatient medications for over 6 months             VTE Pharmacologic Prophylaxis: VTE Score: 6 High Risk (Score >/= 5) - Pharmacological DVT Prophylaxis Ordered: enoxaparin (Lovenox). Sequential Compression Devices Ordered.    Mobility:   Basic Mobility Inpatient Raw Score: 13  JH-HLM Goal: 4: Move to chair/commode  JH-HLM Achieved: 4: Move to chair/commode  JH-HLM Goal achieved. Continue to encourage appropriate mobility.    Patient Centered Rounds: I performed bedside rounds with nursing staff today.   Discussions with Specialists or Other Care Team Provider: CM    Total Time Spent on Date of Encounter in care of patient:  This time was spent on one or more of the following: performing physical exam; counseling and coordination of care; obtaining or reviewing history; documenting in the medical record; reviewing/ordering tests, medications or procedures; communicating with other healthcare  professionals and discussing with patient's family/caregivers.    Current Length of Stay: 2 day(s)  Current Patient Status: Inpatient   Certification Statement: The patient will continue to require additional inpatient hospital stay due to hip fracture pending PT/OT evaluations for safe discharge planning  Discharge Plan: Anticipate discharge in 24-48 hrs to discharge location to be determined pending rehab evaluations.    Code Status: Level 3 - DNAR and DNI    Subjective:   Patient reports she is doing well today.  Feels better than she did yesterday.  Notes improvement in her pain after surgery but still having some pain worse with ambulation.  Agreeable for physical and Occupational Therapy evaluations today.  Agreeable for rehab if necessary.    Objective:     Vitals:   Temp (24hrs), Av.1 °F (36.7 °C), Min:97.3 °F (36.3 °C), Max:99.3 °F (37.4 °C)    Temp:  [97.3 °F (36.3 °C)-99.3 °F (37.4 °C)] 97.9 °F (36.6 °C)  HR:  [70-94] 74  Resp:  [16-23] 16  BP: ()/() 105/54  SpO2:  [77 %-100 %] 95 %  Body mass index is 18.4 kg/m².     Input and Output Summary (last 24 hours):     Intake/Output Summary (Last 24 hours) at 2024 0930  Last data filed at 2024 0829  Gross per 24 hour   Intake 800 ml   Output 1000 ml   Net -200 ml       Physical Exam:   Physical Exam  Vitals reviewed.   Constitutional:       General: She is not in acute distress.  HENT:      Head: Normocephalic and atraumatic.   Eyes:      General: No scleral icterus.     Conjunctiva/sclera: Conjunctivae normal.   Cardiovascular:      Rate and Rhythm: Normal rate and regular rhythm.      Heart sounds: No murmur heard.  Pulmonary:      Effort: Pulmonary effort is normal. No respiratory distress.      Breath sounds: Normal breath sounds. No wheezing.   Abdominal:      General: Bowel sounds are normal. There is no distension.      Palpations: Abdomen is soft.      Tenderness: There is no abdominal tenderness.   Musculoskeletal:       Cervical back: Neck supple.      Right lower leg: No edema.      Left lower leg: No edema.   Skin:     General: Skin is warm and dry.   Neurological:      Mental Status: She is alert and oriented to person, place, and time.   Psychiatric:         Mood and Affect: Mood normal.         Behavior: Behavior normal.          Additional Data:     Labs:  Results from last 7 days   Lab Units 06/26/24  0849 06/25/24  0456 06/24/24  2150   WBC Thousand/uL 6.39   < > 9.92   HEMOGLOBIN g/dL 12.0   < > 12.7   HEMATOCRIT % 36.2   < > 38.2   PLATELETS Thousands/uL 170   < > 304   SEGS PCT %  --   --  72   LYMPHO PCT %  --   --  17   MONO PCT %  --   --  8   EOS PCT %  --   --  1    < > = values in this interval not displayed.     Results from last 7 days   Lab Units 06/25/24  0456   SODIUM mmol/L 140   POTASSIUM mmol/L 3.4*   CHLORIDE mmol/L 108   CO2 mmol/L 20*   BUN mg/dL 13   CREATININE mg/dL 0.39*   ANION GAP mmol/L 12   CALCIUM mg/dL 9.1   ALBUMIN g/dL 3.5   TOTAL BILIRUBIN mg/dL 0.79   ALK PHOS U/L 98   ALT U/L 16   AST U/L 45*   GLUCOSE RANDOM mg/dL 98     Results from last 7 days   Lab Units 06/24/24  2150   INR  1.02                   Lines/Drains:  Invasive Devices       Peripheral Intravenous Line  Duration             Peripheral IV 06/24/24 Left;Ventral (anterior) Forearm 1 day    Peripheral IV 06/25/24 Right;Ventral (anterior) Forearm <1 day                          Imaging: No pertinent imaging reviewed.    Recent Cultures (last 7 days):         Last 24 Hours Medication List:   Current Facility-Administered Medications   Medication Dose Route Frequency Provider Last Rate    acetaminophen  975 mg Oral Q8H NELLY Scarlet Alaniz PA-C      cefazolin  2,000 mg Intravenous Q8H Scarlet Alaniz PA-C 2,000 mg (06/26/24 0214)    enoxaparin  40 mg Subcutaneous Daily Scarlet Alaniz PA-C      folic acid  1 mg Oral Daily Scarlet Alaniz PA-C      HYDROmorphone  0.5 mg Intravenous Q4H PRN Scarlet Alaniz PA-C      multivitamin-minerals  1  tablet Oral Daily Scarlet Alaniz PA-C      nicotine  1 patch Transdermal Daily Scarlet Alaniz PA-C      ondansetron  4 mg Intravenous Q6H PRN Scarlet Alaniz PA-C      oxyCODONE  5 mg Oral Q4H PRN Scarlet Alaniz PA-C      oxyCODONE  2.5 mg Oral Q4H PRN Scarlet Alaniz PA-C      polyethylene glycol  17 g Oral Daily PRN Scarlet Alaniz PA-C      thiamine  100 mg Oral Daily Scarlet Alaniz PA-C          Today, Patient Was Seen By: Makenzie Simpson PA-C    **Please Note: This note may have been constructed using a voice recognition system.**

## 2024-06-26 NOTE — MALNUTRITION/BMI
This medical record reflects one or more clinical indicators suggestive of malnutrition and/or morbid obesity.    Malnutrition Findings:   Adult Malnutrition type: Chronic illness  Adult Degree of Malnutrition: Other severe protein calorie malnutrition  Malnutrition Characteristics: Inadequate energy, Weight loss                  360 Statement: Severe protein calorie malnutrition in context of chronic illness r/t inadequate PO intake, poor appetite as evidance by energy intake less than 75% compared to estimated needs>1month, 15% wt loss x 5 months ( 63.1kg 1/23/24-> 53.3kg 6/25); Treated with liberalized diet, pt declined oral supplements    BMI Findings:  Adult BMI Classifications: Underweight < 18.5        Body mass index is 18.4 kg/m².     See Nutrition note dated 6/26/24 for additional details.  Completed nutrition assessment is viewable in the nutrition documentation.

## 2024-06-26 NOTE — PLAN OF CARE
Problem: OCCUPATIONAL THERAPY ADULT  Goal: Performs self-care activities at highest level of function for planned discharge setting.  See evaluation for individualized goals.  Description: Treatment Interventions: ADL retraining, Functional transfer training, UE strengthening/ROM, Endurance training, Patient/family training, Equipment evaluation/education, Neuromuscular reeducation, Compensatory technique education, Energy conservation, Activityengagement          See flowsheet documentation for full assessment, interventions and recommendations.   Note: Limitation: Decreased ADL status, Decreased UE strength, Decreased Safe judgement during ADL, Decreased endurance, Decreased self-care trans, Decreased high-level ADLs  Prognosis: Good  Assessment: Alyson Madrea is a 59 y.o. female seen for OT evaluation s/p admit to [facility] on 6/24/2024 w/ Closed nondisplaced intertrochanteric fracture of left femur (HCC). S/p IM nail- WBAT LLE. Comorbidities affecting pt's functional performance at time of assessment include:  drug addiction, ETOH abuse, COPD, schizoaffective disorder . Pt with active OT orders and activity orders for Up and OOB as tolerated. Personal factors affecting pt at time of IE include:TOR[place] home environment, difficulty performing ADLs, difficulty performing IADLs, difficulty performing transfers/mobility, functional decline , and new O2 requirements. Prior to admission, pt lives with sister but refusing to go back there. Was I with ADLS, IADLS and mobility with RW. 10 falls. Does not drive. On disability.  Upon evaluation: Pt currently requires supervision for UB ADLs, modA for LB ADLs, modA for toileting, DINORAH for bed mobility, minAx2 for functional transfers, and minAx2 RW mobility 2* the following deficits impacting occupational performance:weakness, decreased strength , decreased balance, and decreased activity tolerance. Pt to benefit from continued skilled OT tx while in the hospital to address  deficits as defined above and maximize level of functional independence w ADL's and functional mobility. Occupational Performance areas to address include: grooming, bathing/shower, toilet hygiene, dressing, functional mobility, community mobility, and clothing management. From OT standpoint, recommendation at time of d/c would be level 2 resources. OT to follow pt on caseload 3-5x/wk.     Rehab Resource Intensity Level, OT: II (Moderate Resource Intensity)

## 2024-06-26 NOTE — PLAN OF CARE
Problem: Potential for Falls  Goal: Patient will remain free of falls  Description: INTERVENTIONS:  - Educate patient/family on patient safety including physical limitations  - Instruct patient to call for assistance with activity   - Consult OT/PT to assist with strengthening/mobility   - Keep Call bell within reach  - Keep bed low and locked with side rails adjusted as appropriate  - Keep care items and personal belongings within reach  - Initiate and maintain comfort rounds  - Make Fall Risk Sign visible to staff  - Offer Toileting every 2 Hours, in advance of need  - Initiate/Maintain bed alarm  - Obtain necessary fall risk management equipment: walker  - Apply yellow socks and bracelet for high fall risk patients  - Consider moving patient to room near nurses station  Outcome: Progressing     Problem: Prexisting or High Potential for Compromised Skin Integrity  Goal: Skin integrity is maintained or improved  Description: INTERVENTIONS:  - Identify patients at risk for skin breakdown  - Assess and monitor skin integrity  - Assess and monitor nutrition and hydration status  - Monitor labs   - Assess for incontinence   - Turn and reposition patient  - Assist with mobility/ambulation  - Relieve pressure over bony prominences  - Avoid friction and shearing  - Provide appropriate hygiene as needed including keeping skin clean and dry  - Evaluate need for skin moisturizer/barrier cream  - Collaborate with interdisciplinary team   - Patient/family teaching  - Consider wound care consult   Outcome: Progressing     Problem: COPING  Goal: Pt/Family able to verbalize concerns and demonstrate effective coping strategies  Description: INTERVENTIONS:  - Assist patient/family to identify coping skills, available support systems and cultural and spiritual values  - Provide emotional support, including active listening and acknowledgement of concerns of patient and caregivers  - Reduce environmental stimuli, as able  - Provide  patient education  - Assess for spiritual pain/suffering and initiate spiritual care, including notification of Pastoral Care or audra based community as needed  - Assess effectiveness of coping strategies  Outcome: Progressing     Problem: SUBSTANCE USE/ABUSE  Goal: Will have no detox symptoms and will verbalize plan for changing substance-related behavior  Description: INTERVENTIONS:  - Monitor physical status and assess for symptoms of withdrawal  - Administer medication as ordered  - Provide emotional support with 1 on 1 interaction with staff  - Encourage recovery focused program/ addiction education  - Assess for verbalization of changing behaviors related to substance abuse  - Initiate consults and referrals as appropriate (Case Management, Spiritual Care, etc.)  Outcome: Progressing     Problem: PAIN - ADULT  Goal: Verbalizes/displays adequate comfort level or baseline comfort level  Description: Interventions:  - Encourage patient to monitor pain and request assistance  - Assess pain using appropriate pain scale  - Administer analgesics based on type and severity of pain and evaluate response  - Implement non-pharmacological measures as appropriate and evaluate response  - Consider cultural and social influences on pain and pain management  - Notify physician/advanced practitioner if interventions unsuccessful or patient reports new pain  Outcome: Progressing

## 2024-06-26 NOTE — OP NOTE
OPERATIVE REPORT  PATIENT NAME: Alyson Madera    :  1965  MRN: [id number]  Pt Location: [facility] OR ROOM 02    SURGERY DATE: 2024    Surgeons and Role:     * DO Milan Dutta Primary    Preop Diagnosis:  Closed nondisplaced intertrochanteric fracture of left femur, initial encounter ([facility]) [S72.145A]    Post-Op Diagnosis Codes:     * Closed nondisplaced intertrochanteric fracture of left femur, initial encounter ([facility]) [S72.145A]    Procedure(s):  Left - INSERTION NAIL IM FEMUR ANTEGRADE (TROCHANTERIC)    Specimen(s):  * No specimens in log *    Estimated Blood Loss:   50 mL    Drains:  * No LDAs found *    Anesthesia Type:   General    Operative Indications:  Closed nondisplaced intertrochanteric fracture of left femur, initial encounter ([facility]) [S72.145A]    Complications:   None    Procedure and Technique:      Estimated Blood Loss:  100 cc              Drains:  none           Implants:  Synthes 11 mm ° with 110 mm helical blade and  38 mm distal interlocking screw     Procedure Details      The patient was seen in the Holding Room. The risks, benefits, complications, treatment options, and expected outcomes were discussed with the patient. The risks and potential complications of their problem and purposed treatment including but not limited to failure of fracture healing or hardware, infection, neurovascular injury, anesthetic complications. The site of surgery properly noted/marked. The patient was taken to Operating Room and identified and the procedure verified as      Left  hip cephalomedullary nail with Synthes TFN. A Time Out was held and the above information confirmed.     After induction of anesthesia, administration of IV  antibiotics, surgical pause was conducted. The patient was placed in a supine position on the fracture table with all bony prominences well padded including the peroneal post.  Care was taken to pad the well leg in a well leg lehman.  After the patient was set up  appropriate AP and lateral x-rays were obtained with traction and slight internal rotation of the extremity to confirm appropriate anatomic reduction of the intertrochanteric hip fracture.  This was confirmed to be appropriately aligned in anatomic in its closed reduction on the fracture table prior to the procedure being performed.     Left  leg and thigh were prepped and draped in the usual sterile fashion.     After the patient was set up appropriate AP and lateral x-rays were obtained with traction and slight internal rotation of the extremity to confirm appropriate anatomic reduction of the intertrochanteric hip fracture.  This was confirmed to be appropriately aligned in anatomic in its closed reduction on the fracture table prior to the procedure being performed.     A #10 blade scalpel used to dissect a 2 in incision proximal and in line with the femur to the greater trochanteric tip.  The skin and subcutaneous fat were dissected sharply with meticulous hemostasis obtained with electrocautery.  The IT band was split in line with its fibers sharply in line with the skin incision.  The abductors were split bluntly with finger dissection to expose the tip of the greater trochanter.  The guide pin was placed under live x-ray at the proximal and just medial tip of the greater trochanter and centered on both AP and lateral x-rays.  This guide pin was then advanced down the center of the femoral canal and confirmed on AP and lateral x-rays to be in the appropriate placement.  The since these femoral opening Reamer was then used under x-ray guidance to open the proximal femoral canal.  A 11 mm Synthes TFN was then placed on the guide down the proximal femur.  This was confirmed on AP and lateral x-rays.     The trocar system was inserted and a skin incision was made 1/2 inches in line with the appropriate placement of the trocar for the helical blade.  The skin incision was dissected down through the skin and  subcutaneous tissue with sharp dissection using a 10 blade and meticulous hemostasis obtained during the dissection with electrocautery.  The lateral IT band and vastus lateralis was split sharply and then bluntly along the lateral femur and the triple trocar guide was then advanced failed to the lateral femoral cortex.     X-rays confirmed appropriate placement of the guide on the lateral femur and proximal distal in the femoral neck.  The guide pin was then placed up the center of the femoral head on the AP and lateral planes and confirmed under x-ray to be in the appropriate placement.  The lateral opening Reamer was then used to open the lateral femoral cortex and a final measurement of the screw length was then obtained and it measured 95 mm.  A Synthes drill was set to 95 mm and the femoral head was reamed with a drill and confirmed under multiple planes of x-ray to show appropriate length for the helical blade.  A helical blade was then advanced over a guidewire into the femoral head and confirmed in AP and lateral x-rays to be appropriately placed at the center center position in the femoral head.  The proximal screwdriver was then used to lock down the screw on the helical blade proximally and confirmed under x-ray to show advancement of the interference screw.     Attention was then turned to the distal interlocking screw.  The trocar was inserted through the guide and the skin incision previously made for the helical blade was extended 1 extra cm distally to accommodate the interlocking screws through the same incision.  The skin and subcutaneous tissue was dissected along with the lateral fascia.  The guide was placed on the bony confirmed on x-ray to be in appropriate placement along the femoral cortex.  A drill was used to drill both the lateral and medial femoral cortex.  A Synthes depth gauge confirmed a 34 mm screw which was placed under x-ray fluoroscopic guidance appropriately.  At this time the  proximal screwdriver was used to disengage the guide from the TFN intramedullary nail.  Final AP and lateral x-rays showed anatomic alignment of the fracture as well as appropriate placement of the Synthes short cephalomedullary nail with interlocking screw distally.           Once this was done,   I irrigated and then closed the deep fascial layers in both incisions with interrupted 0 Vicryl stitches.  I then through 0 Vicryl subcutaneous buried fat stitches to close down the dead space of the fat layer.  2-0 Vicryl was then used to close the subcutaneous tissue with interrupted buried subcutaneous stitches.  The skin was then closed with staples.     A Mepilex soft dressing, was applied. The patient was taken out of the fracture table in  returned to the recovery room without incident                I was present for the entire procedure.    Patient Disposition:  PACU         SIGNATURE: Gabe Lawrence DO  DATE: June 25, 2024  TIME: 9:40 PM

## 2024-06-26 NOTE — PLAN OF CARE
Problem: Potential for Falls  Goal: Patient will remain free of falls  Description: INTERVENTIONS:  - Educate patient/family on patient safety including physical limitations  - Instruct patient to call for assistance with activity   - Consult OT/PT to assist with strengthening/mobility   - Keep Call bell within reach  - Keep bed low and locked with side rails adjusted as appropriate  - Keep care items and personal belongings within reach  - Initiate and maintain comfort rounds  - Make Fall Risk Sign visible to staff  - Offer Toileting every 2 Hours, in advance of need  - Initiate/Maintain bed alarm  - Obtain necessary fall risk management equipment: non slip socks, call bell  - Apply yellow socks and bracelet for high fall risk patients  - Consider moving patient to room near nurses station  Outcome: Progressing     Problem: Prexisting or High Potential for Compromised Skin Integrity  Goal: Skin integrity is maintained or improved  Description: INTERVENTIONS:  - Identify patients at risk for skin breakdown  - Assess and monitor skin integrity  - Assess and monitor nutrition and hydration status  - Monitor labs   - Assess for incontinence   - Turn and reposition patient  - Assist with mobility/ambulation  - Relieve pressure over bony prominences  - Avoid friction and shearing  - Provide appropriate hygiene as needed including keeping skin clean and dry  - Evaluate need for skin moisturizer/barrier cream  - Collaborate with interdisciplinary team   - Patient/family teaching  - Consider wound care consult   Outcome: Progressing     Problem: COPING  Goal: Pt/Family able to verbalize concerns and demonstrate effective coping strategies  Description: INTERVENTIONS:  - Assist patient/family to identify coping skills, available support systems and cultural and spiritual values  - Provide emotional support, including active listening and acknowledgement of concerns of patient and caregivers  - Reduce environmental stimuli,  as able  - Provide patient education  - Assess for spiritual pain/suffering and initiate spiritual care, including notification of Pastoral Care or audra based community as needed  - Assess effectiveness of coping strategies  Outcome: Progressing     Problem: SUBSTANCE USE/ABUSE  Goal: Will have no detox symptoms and will verbalize plan for changing substance-related behavior  Description: INTERVENTIONS:  - Monitor physical status and assess for symptoms of withdrawal  - Administer medication as ordered  - Provide emotional support with 1 on 1 interaction with staff  - Encourage recovery focused program/ addiction education  - Assess for verbalization of changing behaviors related to substance abuse  - Initiate consults and referrals as appropriate (Case Management, Spiritual Care, etc.)  Outcome: Progressing     Problem: SUBSTANCE USE/ABUSE  Goal: By discharge, will develop insight into their chemical dependency and sustain motivation to continue in recovery  Description: INTERVENTIONS:  - Attends all daily group sessions and scheduled AA groups  - Actively practices coping skills through participation in the therapeutic community and adherence to program rules  - Reviews and completes assignments from individual treatment plan  - Assist patient development of understanding of their personal cycle of addiction and relapse triggers  Outcome: Progressing
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
## 2024-06-26 NOTE — ASSESSMENT & PLAN NOTE
Last drink 6/24  Continue CIWA protocol  Remains free of withdrawal symptoms  Can start Librium if she develops worsening symptoms

## 2024-06-26 NOTE — PROGRESS NOTES
Progress Note - Orthopedics   Alyson AMARO Santy 59 y.o. female MRN: [id number]  Unit/Bed#: E2 -01      Subjective:    59 y.o.female POD 1 s/p left hip IM nail for left intertrochanteric femur fracture.  No new acute overnight events, no new complaints.  She reports that her pain is a 6/10 at rest, but increases with movement and walking.  She denies subjective fevers, chills, headaches, CP, SOB, N/V, or numbness or tingling.     Labs:  0   Lab Value Date/Time    HCT 34.3 (L) 06/25/2024 0456    HCT 38.2 06/24/2024 2150    HCT 38.4 05/21/2024 1746    HCT 25.2 (L) 02/14/2023 1247    HCT 24.2 (L) 02/13/2023 0810    HCT 22.1 (L) 02/12/2023 0007    HGB 11.4 (L) 06/25/2024 0456    HGB 12.7 06/24/2024 2150    HGB 12.5 05/21/2024 1746    HGB 8.1 (L) 02/14/2023 1247    HGB 7.7 (L) 02/13/2023 0810    HGB 7.2 (L) 02/12/2023 0007    PT 13.0 02/16/2024 1406    INR 1.02 06/24/2024 2150    INR 1.0 02/16/2024 1406    WBC 8.70 06/25/2024 0456    WBC 9.92 06/24/2024 2150    WBC 6.39 05/21/2024 1746    WBC 5.67 03/26/2015 0557       Meds:    Current Facility-Administered Medications:     acetaminophen (TYLENOL) tablet 975 mg, 975 mg, Oral, [facility]H [facility], Scarlet Alaniz PA-C, 975 mg at 06/26/24 0610    ceFAZolin (ANCEF) IVPB (premix in dextrose) 2,000 mg 50 mL, 2,000 mg, Intravenous, Q8H, Scarlet Alaniz PA-C, Last Rate: 100 mL/hr at 06/26/24 0214, 2,000 mg at 06/26/24 0214    enoxaparin (LOVENOX) subcutaneous injection 40 mg, 40 mg, Subcutaneous, Daily, Scarlet Alaniz PA-C, 40 mg at 06/26/24 0801    folic acid (FOLVITE) tablet 1 mg, 1 mg, Oral, Daily, Scarlet Alaniz PA-C, 1 mg at 06/26/24 0800    HYDROmorphone (DILAUDID) injection 0.5 mg, 0.5 mg, Intravenous, Q4H PRN, Scarlet Alaniz PA-C    multivitamin-minerals (CENTRUM) tablet 1 tablet, 1 tablet, Oral, Daily, Scarlet Alaniz PA-C, 1 tablet at 06/26/24 0800    nicotine (NICODERM CQ) 21 mg/24 hr TD 24 hr patch 1 patch, 1 patch, Transdermal, Daily, Scarlet Alaniz PA-C    ondansetron  "(ZOFRAN) injection 4 mg, 4 mg, Intravenous, Q6H PRN, Scarlet Alaniz PA-C    oxyCODONE (ROXICODONE) IR tablet 5 mg, 5 mg, Oral, Q4H PRN, Scarlet Alaniz PA-C, 5 mg at 06/26/24 0610    oxyCODONE (ROXICODONE) split tablet 2.5 mg, 2.5 mg, Oral, Q4H PRN, Scarlet Alaniz PA-C    polyethylene glycol (MIRALAX) packet 17 g, 17 g, Oral, Daily PRN, Scarlet Alaniz PA-C    thiamine tablet 100 mg, 100 mg, Oral, Daily, Scarlet Alaniz PA-C, 100 mg at 06/26/24 0800    Blood Culture:   Lab Results   Component Value Date    BLOODCX No Growth After 5 Days. 10/20/2019       Wound Culture:   No results found for: \"WOUNDCULT\"    Ins and Outs:  I/O last 24 hours:  In: 1791 [P.O.:360; I.V.:1431]  Out: 1000 [Urine:950; Blood:50]          Physical:  Vitals:    06/26/24 0758   BP: 105/54   Pulse:    Resp:    Temp:    SpO2:      Musculoskeletal: left Lower Extremity  Visible skin is without erythema or ecchymosis.  Dressing C/D/I  TTP over the lateral hip  Sensation intact over the toes  Motor intact to +FHL/EHL, +ankle dorsi/plantar flexion  Digits warm and well perfused  Capillary refill < 2 seconds    Assessment:    59 y.o.female POD 1 s/p left hip IM nail for left intertrochanteric femur fracture on 6/25/24 with Dr. Lawrence.  Patient has a history of alcoholism.    Plan:  WBAT LLE  Will monitor for ABLA and recommend IVF/prbc as indicated for greater than 2 gram Hgb drop or Hgb < 7.  Hgb this morning is 12.0.  No signs of active bleeding in the operative extremity.  PT/OT  Pain control  DVT ppx with Lovenox 40 mg daily per primary team  Medical co-morbidities are being managed per primary team  Continue CIWA protocol  Dispo: Ortho will follow    Ladonna Sheffield PA-C      "

## 2024-06-26 NOTE — PLAN OF CARE
Problem: PHYSICAL THERAPY ADULT  Goal: Performs mobility at highest level of function for planned discharge setting.  See evaluation for individualized goals.  Description: Treatment/Interventions: Functional transfer training, LE strengthening/ROM, Elevations, Therapeutic exercise, Endurance training, Patient/family training, Equipment eval/education, Bed mobility, Gait training, Spoke to nursing, OT  Equipment Recommended: Walker (pt has)       See flowsheet documentation for full assessment, interventions and recommendations.  Note: Prognosis: Fair  Problem List: Decreased strength, Decreased range of motion, Decreased endurance, Impaired balance, Decreased mobility, Impaired judgement, Decreased safety awareness, Orthopedic restrictions, Pain  Assessment: Pt is a 59 y.o. female presented s/p fall with nondisplaced L intertrochanteric femur fracture. Pt admitted for closed nondisplaced intertrochanteric fracture of L femur, avascular necrosis of L femoral head, and alcohol use disorder. Pt s/p L insertion nail IM femur antegrade-intertrochanteric 6/25/24. PT consulted for mobility assessment and safe D/C planning with orders of Ambulate and WBAT LLE. Please see above flowsheet for PLOF and home set-up. PTA, pt was I with ADLs/functional mobility w/ RW/iADLs. Upon exam, pt is min Ax2 for transfers and ambulation w/ RW. Strength, gait, and balance deficits as noted above, no gross LOB instances. Amb distance limited 2* to pt fear of putting weight through her LLE and reports of significant pain. The patient's AM-PAC Basic Mobility Inpatient Short Form Raw Score is 13. A Raw score of less than or equal to 16 suggests the patient may benefit from discharge to post-acute rehabilitation services. Please also refer to the recommendation of the Physical Therapist for safe discharge planning. PT recommends Level II (moderate resource intensity) upon D/C. IPPT will continue during admission at a frequency of 3-5x/wk with  a focus on strengthening and activity tolerance. Co-eval was necessary to complete this eval for the pts best interest given the pt's medical acuity and complexity.    Barriers to Discharge: Inaccessible home environment, Decreased caregiver support  Barriers to Discharge Comments: pt states she will not be returning to sister's home upon D/C so ? availability of support and accessiblity of new home environment    Rehab Resource Intensity Level, PT: II (Moderate Resource Intensity)    See flowsheet documentation for full assessment.
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
## 2024-06-26 NOTE — ASSESSMENT & PLAN NOTE
Patient found to have closed nondisplaced intertrochanteric fracture and avascular necrosis of left hip  Ortho consult placed  To the OR last night for fixation  Does have history of alcohol abuse but does not appear in active withdrawal  Continue as needed analgesics  Follow-up PT/OT evaluations, would likely need rehab

## 2024-06-27 PROBLEM — E43 SEVERE PROTEIN-CALORIE MALNUTRITION (HCC): Status: ACTIVE | Noted: 2024-06-27

## 2024-06-27 PROCEDURE — 94762 N-INVAS EAR/PLS OXIMTRY CONT: CPT

## 2024-06-27 PROCEDURE — 99024 POSTOP FOLLOW-UP VISIT: CPT

## 2024-06-27 PROCEDURE — 97530 THERAPEUTIC ACTIVITIES: CPT

## 2024-06-27 PROCEDURE — 97116 GAIT TRAINING THERAPY: CPT

## 2024-06-27 PROCEDURE — 99232 SBSQ HOSP IP/OBS MODERATE 35: CPT | Performed by: PHYSICIAN ASSISTANT

## 2024-06-27 PROCEDURE — 97110 THERAPEUTIC EXERCISES: CPT

## 2024-06-27 RX ADMIN — THIAMINE HCL TAB 100 MG 100 MG: 100 TAB at 08:38

## 2024-06-27 RX ADMIN — FOLIC ACID 1 MG: 1 TABLET ORAL at 08:38

## 2024-06-27 RX ADMIN — ACETAMINOPHEN 975 MG: 325 TABLET, FILM COATED ORAL at 22:11

## 2024-06-27 RX ADMIN — ENOXAPARIN SODIUM 40 MG: 40 INJECTION SUBCUTANEOUS at 08:38

## 2024-06-27 RX ADMIN — ACETAMINOPHEN 975 MG: 325 TABLET, FILM COATED ORAL at 06:03

## 2024-06-27 RX ADMIN — MULTIPLE VITAMINS W/ MINERALS TAB 1 TABLET: TAB ORAL at 08:38

## 2024-06-27 RX ADMIN — ACETAMINOPHEN 975 MG: 325 TABLET, FILM COATED ORAL at 14:49

## 2024-06-27 NOTE — ASSESSMENT & PLAN NOTE
Last drink 6/24  Continue CIWA protocol  Remains free of withdrawal symptoms  Can start Librium if she develops worsening symptoms but has remained stable throughout hospital stay

## 2024-06-27 NOTE — PHYSICAL THERAPY NOTE
PHYSICAL THERAPY NOTE          Patient Name: Alyson Madera  Today's Date: 6/27/2024 06/27/24 4712   Note Type   Note Type Treatment   Pain Assessment   Pain Assessment Tool 0-10   Pain Score 5   Pain Location/Orientation Orientation: Lower;Location: Back   Hospital Pain Intervention(s) Repositioned;Ambulation/increased activity;Emotional support   Restrictions/Precautions   LLE Weight Bearing Per Order WBAT   Other Precautions WBS;Pain;Fall Risk;Bed Alarm;Chair Alarm   General   Chart Reviewed Yes   Family/Caregiver Present No   Cognition   Overall Cognitive Status WFL   Arousal/Participation Cooperative;Alert;Responsive   Attention Within functional limits   Orientation Level Oriented X4   Following Commands Follows one step commands without difficulty   Subjective   Subjective I was just up to the commode.  pt reports increased pain with weightbearing activity.   Bed Mobility   Additional Comments pt out of bed in reclinr, pt remained out of bed in recliner.   Transfers   Sit to Stand 3  Moderate assistance   Additional items Assist x 1;Armrests;Increased time required;Verbal cues   Stand to Sit 4  Minimal assistance   Additional items Assist x 1;Armrests;Increased time required;Verbal cues   Ambulation/Elevation   Gait pattern Poor UE support;Antalgic;Decreased L stance;Foward flexed;Short stride;Step to;Excessively slow;Decreased heel strike;Decreased toe off;Improper Weight shift   Gait Assistance 3  Moderate assist   Additional items Assist x 1;Verbal cues   Assistive Device Rolling walker   Distance 16' x1   Ambulation/Elevation Additional Comments decreased weightbearing through L le due to increased L le pain with weightbearing activity.   Balance   Static Sitting Good   Dynamic Sitting Fair   Static Standing Poor +  (w rw)   Dynamic Standing Poor +   Ambulatory Poor +  (w rw)   Endurance Deficit   Endurance Deficit  Description pain, weakness, fatigue.   Activity Tolerance   Activity Tolerance Patient limited by pain;Patient limited by fatigue   Exercises   Quad Sets Sitting;10 reps;AROM;Left   Glute Sets Sitting;10 reps;AROM;Bilateral   Knee AROM Long Arc Quad Sitting;10 reps;AROM;Bilateral   Ankle Pumps Sitting;10 reps;AROM;Bilateral   Marching Sitting;10 reps;AAROM;Left  (arom R le.)   Assessment   Prognosis Fair   Problem List Decreased strength;Decreased range of motion;Decreased endurance;Impaired balance;Decreased mobility;Pain   Assessment Pt seen for PT treatment session this date with interventions consisting of bed mobility, transfer training, gait training, and HEP, and education provided as needed for safety and direction to improve functional mobility, safety awareness, and activity tolerance. Pt agreeable to PT treatment session upon arrival, pt found seated out of bed in recliner . At end of session, pt left seated out of bed in recliner with all needs in reach. In comparison to previous session, pt with improvement in activity tolerance, endurance, standing balance, ambulation distances, ambulatory balance, and functional mobility. Pt  is showing progress with improvements as noted. Overall pt is requiring less assistance for transfers and ambulation. Pt  progressed with ambulation distances to 16' x1 with mod-assist x1.  Pt demonstrates unsteady gait due antalgia, increased pain L le with weightbearing activity.  NO gross LOB.  Pt  eprforms Lle aa-0arom x 10 reps.  And R le arom exercise. Pt tolerated fair, rest breaks required due to increased pain in L groin and thigh.  Continue to recommend  level II moderate rehab resource intensity  at time of d/c in order to maximize pt's functional independence and safety w/ mobility. Pt continues to be functioning below baseline level. PT will continue to see pt while here in order to address the deficits listed above and provide interventions consistent w/ POC in  effort to achieve STGs.    The patient's AM-[facility] Basic Mobility Inpatient Short Form Raw Score is 11. A raw score less than 16 suggests the patient may benefit from discharge to post-acute rehabilitation services. Please also refer to the recommendation of the Physical Therapist for safe discharge planning.   Goals   Patient Goals walking, being mobilie again.   PT Treatment Day 1   Plan   Treatment/Interventions Functional transfer training;LE strengthening/ROM;Therapeutic exercise;Endurance training;Patient/family training;Equipment eval/education;Gait training   Progress Slow progress, decreased activity tolerance   PT Frequency 4-6x/wk   Discharge Recommendation   Rehab Resource Intensity Level, PT II (Moderate Resource Intensity)   AM-PAC Basic Mobility Inpatient   Turning in Flat Bed Without Bedrails 2   Lying on Back to Sitting on Edge of Flat Bed Without Bedrails 2   Moving Bed to Chair 2   Standing Up From Chair Using Arms 2   Walk in Room 2   Climb 3-5 Stairs With Railing 1   Basic Mobility Inpatient Raw Score 11   Basic Mobility Standardized Score 30.25   [facility] Highest Level Of Mobility   -[facility] Goal 4: Move to chair/commode   -[facility] Achieved 6: Walk 10 steps or more   Education   Education Provided Mobility training;Home exercise program;Assistive device   Patient Demonstrates acceptance/verbal understanding;Reinforcement needed   End of Consult   Patient Position at End of Consult Bedside chair;All needs within reach   End of Consult Comments le's elevated, ice to L lateral thigh and groin.        06/27/24 1345   Note Type   Note Type Treatment   Pain Assessment   Pain Assessment Tool 0-10   Pain Score 5   Pain Location/Orientation Orientation: Lower;Location: Back   Hospital Pain Intervention(s) Repositioned;Ambulation/increased activity;Emotional support   Restrictions/Precautions   LLE Weight Bearing Per Order WBAT   Other Precautions WBS;Pain;Fall Risk;Bed Alarm;Chair Alarm   General   Chart  Reviewed Yes   Family/Caregiver Present No   Cognition   Overall Cognitive Status WFL   Arousal/Participation Cooperative;Alert;Responsive   Attention Within functional limits   Orientation Level Oriented X4   Following Commands Follows one step commands without difficulty   Subjective   Subjective I was just up to the commode.  pt reports increased pain with weightbearing activity.   Bed Mobility   Additional Comments pt out of bed in reclinr, pt remained out of bed in recliner.   Transfers   Sit to Stand 3  Moderate assistance   Additional items Assist x 1;Armrests;Increased time required;Verbal cues   Stand to Sit 4  Minimal assistance   Additional items Assist x 1;Armrests;Increased time required;Verbal cues   Ambulation/Elevation   Gait pattern Poor UE support;Antalgic;Decreased L stance;Foward flexed;Short stride;Step to;Excessively slow;Decreased heel strike;Decreased toe off;Improper Weight shift   Gait Assistance 3  Moderate assist   Additional items Assist x 1;Verbal cues   Assistive Device Rolling walker   Distance 16' x1   Ambulation/Elevation Additional Comments decreased weightbearing through L le due to increased L le pain with weightbearing activity.   Balance   Static Sitting Good   Dynamic Sitting Fair   Static Standing Poor +  (w rw)   Dynamic Standing Poor +   Ambulatory Poor +  (w rw)   Endurance Deficit   Endurance Deficit Description pain, weakness, fatigue.   Activity Tolerance   Activity Tolerance Patient limited by pain;Patient limited by fatigue   Exercises   Quad Sets Sitting;10 reps;AROM;Left   Glute Sets Sitting;10 reps;AROM;Bilateral   Knee AROM Long Arc Quad Sitting;10 reps;AROM;Bilateral   Ankle Pumps Sitting;10 reps;AROM;Bilateral   Marching Sitting;10 reps;AAROM;Left  (arom R le.)   Assessment   Prognosis Fair   Problem List Decreased strength;Decreased range of motion;Decreased endurance;Impaired balance;Decreased mobility;Pain   Assessment Pt seen for PT treatment session this  date with interventions consisting of bed mobility, transfer training, gait training, and HEP, and education provided as needed for safety and direction to improve functional mobility, safety awareness, and activity tolerance. Pt agreeable to PT treatment session upon arrival, pt found seated out of bed in recliner . At end of session, pt left seated out of bed in recliner with all needs in reach. In comparison to previous session, pt with improvement in activity tolerance, endurance, standing balance, ambulation distances, ambulatory balance, and functional mobility. Pt  is showing progress with improvements as noted. Overall pt is requiring less assistance for transfers and ambulation. Pt  progressed with ambulation distances to 16' x1 with mod-assist x1.  Pt demonstrates unsteady gait due antalgia, increased pain L le with weightbearing activity.  NO gross LOB.  Pt  eprforms Lle aa-0arom x 10 reps.  And R le arom exercise. Pt tolerated fair, rest breaks required due to increased pain in L groin and thigh.  Continue to recommend  level II moderate rehab resource intensity  at time of d/c in order to maximize pt's functional independence and safety w/ mobility. Pt continues to be functioning below baseline level. PT will continue to see pt while here in order to address the deficits listed above and provide interventions consistent w/ POC in effort to achieve STGs.    The patient's AM-PAC Basic Mobility Inpatient Short Form Raw Score is 11. A raw score less than 16 suggests the patient may benefit from discharge to post-acute rehabilitation services. Please also refer to the recommendation of the Physical Therapist for safe discharge planning.   Goals   Patient Goals walking, being mobilie again.   PT Treatment Day 1   Plan   Treatment/Interventions Functional transfer training;LE strengthening/ROM;Therapeutic exercise;Endurance training;Patient/family training;Equipment eval/education;Gait training   Progress Slow  progress, decreased activity tolerance   PT Frequency 4-6x/wk   Discharge Recommendation   Rehab Resource Intensity Level, PT II (Moderate Resource Intensity)   AM-PAC Basic Mobility Inpatient   Turning in Flat Bed Without Bedrails 2   Lying on Back to Sitting on Edge of Flat Bed Without Bedrails 2   Moving Bed to Chair 2   Standing Up From Chair Using Arms 2   Walk in Room 2   Climb 3-5 Stairs With Railing 1   Basic Mobility Inpatient Raw Score 11   Basic Mobility Standardized Score 30.25   [facility] Highest Level Of Mobility   -[facility] Goal 4: Move to chair/commode   -[facility] Achieved 6: Walk 10 steps or more   Education   Education Provided Mobility training;Home exercise program;Assistive device   Patient Demonstrates acceptance/verbal understanding;Reinforcement needed   End of Consult   Patient Position at End of Consult Bedside chair;All needs within reach   End of Consult Comments le's elevated, ice to L lateral thigh and groin.     Danelle Adams, PTA

## 2024-06-27 NOTE — PLAN OF CARE
Problem: Potential for Falls  Goal: Patient will remain free of falls  Description: INTERVENTIONS:  - Educate patient/family on patient safety including physical limitations  - Instruct patient to call for assistance with activity   - Consult OT/PT to assist with strengthening/mobility   - Keep Call bell within reach  - Keep bed low and locked with side rails adjusted as appropriate  - Keep care items and personal belongings within reach  - Initiate and maintain comfort rounds  - Make Fall Risk Sign visible to staff  - Offer Toileting every 2 Hours, in advance of need  - Initiate/Maintain bed alarm  - Obtain necessary fall risk management equipment:   - Apply yellow socks and bracelet for high fall risk patients  - Consider moving patient to room near nurses station  Outcome: Progressing     Problem: Prexisting or High Potential for Compromised Skin Integrity  Goal: Skin integrity is maintained or improved  Description: INTERVENTIONS:  - Identify patients at risk for skin breakdown  - Assess and monitor skin integrity  - Assess and monitor nutrition and hydration status  - Monitor labs   - Assess for incontinence   - Turn and reposition patient  - Assist with mobility/ambulation  - Relieve pressure over bony prominences  - Avoid friction and shearing  - Provide appropriate hygiene as needed including keeping skin clean and dry  - Evaluate need for skin moisturizer/barrier cream  - Collaborate with interdisciplinary team   - Patient/family teaching  - Consider wound care consult   Outcome: Progressing     Problem: COPING  Goal: Pt/Family able to verbalize concerns and demonstrate effective coping strategies  Description: INTERVENTIONS:  - Assist patient/family to identify coping skills, available support systems and cultural and spiritual values  - Provide emotional support, including active listening and acknowledgement of concerns of patient and caregivers  - Reduce environmental stimuli, as able  - Provide  patient education  - Assess for spiritual pain/suffering and initiate spiritual care, including notification of Pastoral Care or audra based community as needed  - Assess effectiveness of coping strategies  Outcome: Progressing  Goal: Will report anxiety at manageable levels  Description: INTERVENTIONS:  - Administer medication as ordered  - Teach and encourage coping skills  - Provide emotional support  - Assess patient/family for anxiety and ability to cope  Outcome: Progressing     Problem: SUBSTANCE USE/ABUSE  Goal: Will have no detox symptoms and will verbalize plan for changing substance-related behavior  Description: INTERVENTIONS:  - Monitor physical status and assess for symptoms of withdrawal  - Administer medication as ordered  - Provide emotional support with 1 on 1 interaction with staff  - Encourage recovery focused program/ addiction education  - Assess for verbalization of changing behaviors related to substance abuse  - Initiate consults and referrals as appropriate (Case Management, Spiritual Care, etc.)  Outcome: Progressing  Goal: By discharge, will develop insight into their chemical dependency and sustain motivation to continue in recovery  Description: INTERVENTIONS:  - Attends all daily group sessions and scheduled AA groups  - Actively practices coping skills through participation in the therapeutic community and adherence to program rules  - Reviews and completes assignments from individual treatment plan  - Assist patient development of understanding of their personal cycle of addiction and relapse triggers  Outcome: Progressing  Goal: By discharge, patient will have ongoing treatment plan addressing chemical dependency  Description: INTERVENTIONS:  - Assist patient with resources and/or appointments for ongoing recovery based living  Outcome: Progressing     Problem: PAIN - ADULT  Goal: Verbalizes/displays adequate comfort level or baseline comfort level  Description: Interventions:  -  Encourage patient to monitor pain and request assistance  - Assess pain using appropriate pain scale  - Administer analgesics based on type and severity of pain and evaluate response  - Implement non-pharmacological measures as appropriate and evaluate response  - Consider cultural and social influences on pain and pain management  - Notify physician/advanced practitioner if interventions unsuccessful or patient reports new pain  Outcome: Progressing     Problem: Nutrition/Hydration-ADULT  Goal: Nutrient/Hydration intake appropriate for improving, restoring or maintaining nutritional needs  Description: Monitor and assess patient's nutrition/hydration status for malnutrition. Collaborate with interdisciplinary team and initiate plan and interventions as ordered.  Monitor patient's weight and dietary intake as ordered or per policy. Utilize nutrition screening tool and intervene as necessary. Determine patient's food preferences and provide high-protein, high-caloric foods as appropriate.     INTERVENTIONS:  - Monitor oral intake, urinary output, labs, and treatment plans  - Assess nutrition and hydration status and recommend course of action  - Evaluate amount of meals eaten  - Assist patient with eating if necessary   - Allow adequate time for meals  - Recommend/ encourage appropriate diets, oral nutritional supplements, and vitamin/mineral supplements  - Order, calculate, and assess calorie counts as needed  - Recommend, monitor, and adjust tube feedings and TPN/PPN based on assessed needs  - Assess need for intravenous fluids  - Provide specific nutrition/hydration education as appropriate  - Include patient/family/caregiver in decisions related to nutrition  Outcome: Progressing

## 2024-06-27 NOTE — ASSESSMENT & PLAN NOTE
Malnutrition Findings:   Adult Malnutrition type: Chronic illness  Adult Degree of Malnutrition: Other severe protein calorie malnutrition  Malnutrition Characteristics: Inadequate energy, Weight loss                  360 Statement: Severe protein calorie malnutrition in context of chronic illness r/t inadequate PO intake, poor appetite as evidance by energy intake less than 75% compared to estimated needs>1month, 15% wt loss x 5 months ( 63.1kg 1/23/24-> 53.3kg 6/25); Treated with liberalized diet, pt declined oral supplements    BMI Findings:  Adult BMI Classifications: Underweight < 18.5        Body mass index is 18.4 kg/m².

## 2024-06-27 NOTE — PROGRESS NOTES
Progress Note - Orthopedics   Alyson AMARO Santy 59 y.o. female MRN: [id number]  Unit/Bed#: E2 -01      Subjective:    59 y.o.female POD 2 s/p left hip IM nail for left intertrochanteric femur fracture.  Patient seen and examined at bedside.  Patient reports expected postoperative pain but this is improving.  She has been getting up and trying to walk to the bathroom.  She denies any numbness and tingling down the leg.    Labs:  0   Lab Value Date/Time    HCT 36.2 06/26/2024 0849    HCT 34.3 (L) 06/25/2024 0456    HCT 38.2 06/24/2024 2150    HCT 25.2 (L) 02/14/2023 1247    HCT 24.2 (L) 02/13/2023 0810    HCT 22.1 (L) 02/12/2023 0007    HGB 12.0 06/26/2024 0849    HGB 11.4 (L) 06/25/2024 0456    HGB 12.7 06/24/2024 2150    HGB 8.1 (L) 02/14/2023 1247    HGB 7.7 (L) 02/13/2023 0810    HGB 7.2 (L) 02/12/2023 0007    PT 13.0 02/16/2024 1406    INR 1.02 06/24/2024 2150    INR 1.0 02/16/2024 1406    WBC 6.39 06/26/2024 0849    WBC 8.70 06/25/2024 0456    WBC 9.92 06/24/2024 2150    WBC 5.67 03/26/2015 0557       Meds:    Current Facility-Administered Medications:     acetaminophen (TYLENOL) tablet 975 mg, 975 mg, Oral, Q8H [facility], Scarlet Alaniz PA-C, 975 mg at 06/27/24 0603    enoxaparin (LOVENOX) subcutaneous injection 40 mg, 40 mg, Subcutaneous, Daily, Scarlet Alaniz PA-C, 40 mg at 06/27/24 0838    folic acid (FOLVITE) tablet 1 mg, 1 mg, Oral, Daily, Scarlet Alaniz PA-C, 1 mg at 06/27/24 0838    HYDROmorphone (DILAUDID) injection 0.5 mg, 0.5 mg, Intravenous, Q4H PRN, Scarlet Alaniz PA-C    LORazepam (ATIVAN) injection 2 mg, 2 mg, Intravenous, Q6H PRN, Makenzie Simpson PA-C    multivitamin-minerals (CENTRUM) tablet 1 tablet, 1 tablet, Oral, Daily, Scarlet Alaniz PA-C, 1 tablet at 06/27/24 0838    nicotine (NICODERM CQ) 21 mg/24 hr TD 24 hr patch 1 patch, 1 patch, Transdermal, Daily, Scarlet Alaniz PA-C    ondansetron (ZOFRAN) injection 4 mg, 4 mg, Intravenous, Q6H PRN, Scarlet Alaniz PA-C    oxyCODONE (ROXICODONE) IR  "tablet 5 mg, 5 mg, Oral, Q4H PRN, Scarlet Alaniz PA-C, 5 mg at 06/26/24 0610    oxyCODONE (ROXICODONE) split tablet 2.5 mg, 2.5 mg, Oral, Q4H PRN, Scarlet Alaniz PA-C    polyethylene glycol (MIRALAX) packet 17 g, 17 g, Oral, Daily PRN, Scarlet Alaniz PA-C    thiamine tablet 100 mg, 100 mg, Oral, Daily, Scarlet Alaniz PA-C, 100 mg at 06/27/24 0838    Blood Culture:   Lab Results   Component Value Date    BLOODCX No Growth After 5 Days. 10/20/2019       Wound Culture:   No results found for: \"WOUNDCULT\"    Ins and Outs:  I/O last 24 hours:  In: -   Out: 1600 [Urine:1600]          Physical:  Vitals:    06/27/24 0756   BP: 97/65   Pulse: 78   Resp: 18   Temp: (!) 97.2 °F (36.2 °C)   SpO2: 96%     Musculoskeletal: left Lower Extremity  Visible skin is without erythema or ecchymosis.  Dressing C/D/I  TTP over the lateral hip  Sensation intact over the toes  Motor intact to +FHL/EHL, +ankle dorsi/plantar flexion  Digits warm and well perfused  Capillary refill < 2 seconds    Assessment:    59 y.o.female POD 2 s/p left hip IM nail for left intertrochanteric femur fracture on 6/25/24 with Dr. Lawrence.  Patient has a history of alcoholism.    Plan:  WBAT LLE  Will monitor for ABLA and recommend IVF/prbc as indicated for greater than 2 gram Hgb drop or Hgb < 7.  Hgb yesterday was 12.0.  No signs of active bleeding in the operative extremity.  PT/OT  Pain control  DVT ppx with Lovenox 40 mg daily per primary team  Medical co-morbidities are being managed per primary team  Continue [facility] protocol  Dispo: Ortho will follow    Oz Ellis PA-C      "

## 2024-06-27 NOTE — PLAN OF CARE
Problem: PHYSICAL THERAPY ADULT  Goal: Performs mobility at highest level of function for planned discharge setting.  See evaluation for individualized goals.  Description: Treatment/Interventions: Functional transfer training, LE strengthening/ROM, Elevations, Therapeutic exercise, Endurance training, Patient/family training, Equipment eval/education, Bed mobility, Gait training, Spoke to nursing, OT  Equipment Recommended: Walker (pt has)       See flowsheet documentation for full assessment, interventions and recommendations.  Outcome: Progressing  Note: Prognosis: Fair  Problem List: Decreased strength, Decreased range of motion, Decreased endurance, Impaired balance, Decreased mobility, Pain  Assessment: Pt seen for PT treatment session this date with interventions consisting of bed mobility, transfer training, gait training, and HEP, and education provided as needed for safety and direction to improve functional mobility, safety awareness, and activity tolerance. Pt agreeable to PT treatment session upon arrival, pt found seated out of bed in recliner . At end of session, pt left seated out of bed in recliner with all needs in reach. In comparison to previous session, pt with improvement in activity tolerance, endurance, standing balance, ambulation distances, ambulatory balance, and functional mobility. Pt  is showing progress with improvements as noted. Overall pt is requiring less assistance for transfers and ambulation. Pt  progressed with ambulation distances to 16' x1 with mod-assist x1.  Pt demonstrates unsteady gait due antalgia, increased pain L le with weightbearing activity.  NO gross LOB.  Pt  eprforms Lle aa-0arom x 10 reps.  And R le arom exercise. Pt tolerated fair, rest breaks required due to increased pain in L groin and thigh.  Continue to recommend  level II moderate rehab resource intensity  at time of d/c in order to maximize pt's functional independence and safety w/ mobility. Pt  continues to be functioning below baseline level. PT will continue to see pt while here in order to address the deficits listed above and provide interventions consistent w/ POC in effort to achieve STGs.    The patient's AM-PAC Basic Mobility Inpatient Short Form Raw Score is 11. A raw score less than 16 suggests the patient may benefit from discharge to post-acute rehabilitation services. Please also refer to the recommendation of the Physical Therapist for safe discharge planning.  Barriers to Discharge: Inaccessible home environment, Decreased caregiver support  Barriers to Discharge Comments: pt states she will not be returning to sister's home upon D/C so ? availability of support and accessiblity of new home environment  Rehab Resource Intensity Level, PT: II (Moderate Resource Intensity)    See flowsheet documentation for full assessment.

## 2024-06-27 NOTE — ASSESSMENT & PLAN NOTE
Patient found to have closed nondisplaced intertrochanteric fracture and avascular necrosis of left hip  Seen in consult orthopedic surgery  Underwent OR fixation 6/25/2024  Continue as needed analgesics, pain well-controlled on 5 mg of oxycodone as needed  Follow-up PT/OT evaluations, will need rehab stay at time of discharge, optioning process undergoing   Continue DVT prophylaxis

## 2024-06-27 NOTE — PROGRESS NOTES
[facility]  Progress Note  Name: Alyson Madera I  MRN: [id number]  Unit/Bed#: E2 -01 I Date of Admission: 6/24/2024   Date of Service: 6/27/2024 I Hospital Day: 3    Assessment & Plan   * Closed nondisplaced intertrochanteric fracture of left femur (HCC)  Assessment & Plan  Patient found to have closed nondisplaced intertrochanteric fracture and avascular necrosis of left hip  Seen in consult orthopedic surgery  Underwent OR fixation 6/25/2024  Continue as needed analgesics, pain well-controlled on 5 mg of oxycodone as needed  Follow-up PT/OT evaluations, will need rehab stay at time of discharge, optioning process undergoing   Continue DVT prophylaxis     Severe protein-calorie malnutrition (HCC)  Assessment & Plan  Malnutrition Findings:   Adult Malnutrition type: Chronic illness  Adult Degree of Malnutrition: Other severe protein calorie malnutrition  Malnutrition Characteristics: Inadequate energy, Weight loss                  360 Statement: Severe protein calorie malnutrition in context of chronic illness r/t inadequate PO intake, poor appetite as evidance by energy intake less than 75% compared to estimated needs>1month, 15% wt loss x 5 months ( 63.1kg 1/23/24-> 53.3kg 6/25); Treated with liberalized diet, pt declined oral supplements    BMI Findings:  Adult BMI Classifications: Underweight < 18.5        Body mass index is 18.4 kg/m².       Avascular necrosis of femur head, left (HCC)  Assessment & Plan  Seen by orthopedic surgery    Alcohol use disorder, severe, dependence (HCC)  Assessment & Plan  Last drink 6/24  Continue CIWA protocol  Remains free of withdrawal symptoms  Can start Librium if she develops worsening symptoms but has remained stable throughout hospital stay    Schizoaffective disorder, bipolar type (HCC)  Assessment & Plan  Has not been taking any of her outpatient medications for over 6 months  Cleared by psychiatry for rehab placement, no indication for  initiation of medications per psychiatry             VTE Pharmacologic Prophylaxis: VTE Score: 6 High Risk (Score >/= 5) - Pharmacological DVT Prophylaxis Ordered: enoxaparin (Lovenox). Sequential Compression Devices Ordered.    Mobility:   Basic Mobility Inpatient Raw Score: 13  JH-HLM Goal: 4: Move to chair/commode  JH-HLM Achieved: 4: Move to chair/commode  JH-HLM Goal achieved. Continue to encourage appropriate mobility.    Patient Centered Rounds: I performed bedside rounds with nursing staff today.   Discussions with Specialists or Other Care Team Provider: Case management    Education and Discussions with Family / Patient: Patient declined call to .     Total Time Spent on Date of Encounter in care of patient: This time was spent on one or more of the following: performing physical exam; counseling and coordination of care; obtaining or reviewing history; documenting in the medical record; reviewing/ordering tests, medications or procedures; communicating with other healthcare professionals and discussing with patient's family/caregivers.    Current Length of Stay: 3 day(s)  Current Patient Status: Inpatient   Certification Statement: The patient will continue to require additional inpatient hospital stay due to hip fracture pending rehab placement  Discharge Plan: Anticipate discharge in 48-72 hrs to rehab facility.    Code Status: Level 3 - DNAR and DNI    Subjective:   Patient reports she is doing well today.  Notes her pain is well-controlled with as needed oxycodone and Tylenol.  Does have increased pain with ambulation but otherwise doing very well.    Objective:     Vitals:   Temp (24hrs), Av.2 °F (36.8 °C), Min:97.2 °F (36.2 °C), Max:98.8 °F (37.1 °C)    Temp:  [97.2 °F (36.2 °C)-98.8 °F (37.1 °C)] 97.2 °F (36.2 °C)  HR:  [78-98] 78  Resp:  [17-18] 18  BP: ()/(56-70) 97/65  SpO2:  [96 %-99 %] 96 %  Body mass index is 18.4 kg/m².     Input and Output Summary (last 24 hours):      Intake/Output Summary (Last 24 hours) at 6/27/2024 0840  Last data filed at 6/27/2024 0015  Gross per 24 hour   Intake --   Output 1200 ml   Net -1200 ml       Physical Exam:   Physical Exam  Vitals reviewed.   Constitutional:       General: She is not in acute distress.  HENT:      Head: Normocephalic and atraumatic.   Eyes:      General: No scleral icterus.     Conjunctiva/sclera: Conjunctivae normal.   Cardiovascular:      Rate and Rhythm: Normal rate and regular rhythm.      Heart sounds: No murmur heard.  Pulmonary:      Effort: Pulmonary effort is normal. No respiratory distress.      Breath sounds: Normal breath sounds. No wheezing or rales.   Abdominal:      General: Bowel sounds are normal. There is no distension.      Palpations: Abdomen is soft.      Tenderness: There is no abdominal tenderness.   Musculoskeletal:      Cervical back: Neck supple.      Right lower leg: No edema.      Left lower leg: No edema.   Skin:     General: Skin is warm and dry.   Neurological:      Mental Status: She is alert and oriented to person, place, and time.   Psychiatric:         Mood and Affect: Mood normal.         Behavior: Behavior normal.          Additional Data:     Labs:  Results from last 7 days   Lab Units 06/26/24  0849 06/25/24  0456 06/24/24  2150   WBC Thousand/uL 6.39   < > 9.92   HEMOGLOBIN g/dL 12.0   < > 12.7   HEMATOCRIT % 36.2   < > 38.2   PLATELETS Thousands/uL 170   < > 304   SEGS PCT %  --   --  72   LYMPHO PCT %  --   --  17   MONO PCT %  --   --  8   EOS PCT %  --   --  1    < > = values in this interval not displayed.     Results from last 7 days   Lab Units 06/26/24  0849 06/25/24  0456   SODIUM mmol/L 135 140   POTASSIUM mmol/L 3.7 3.4*   CHLORIDE mmol/L 103 108   CO2 mmol/L 25 20*   BUN mg/dL 5 13   CREATININE mg/dL 0.49* 0.39*   ANION GAP mmol/L 7 12   CALCIUM mg/dL 8.7 9.1   ALBUMIN g/dL  --  3.5   TOTAL BILIRUBIN mg/dL  --  0.79   ALK PHOS U/L  --  98   ALT U/L  --  16   AST U/L  --  45*    GLUCOSE RANDOM mg/dL 165* 98     Results from last 7 days   Lab Units 06/24/24  2150   INR  1.02                   Lines/Drains:  Invasive Devices       Peripheral Intravenous Line  Duration             Peripheral IV 06/24/24 Left;Ventral (anterior) Forearm 2 days    Peripheral IV 06/25/24 Right;Ventral (anterior) Forearm 1 day                          Imaging: No pertinent imaging reviewed.    Recent Cultures (last 7 days):         Last 24 Hours Medication List:   Current Facility-Administered Medications   Medication Dose Route Frequency Provider Last Rate    acetaminophen  975 mg Oral Q8H NELLY Scarlet Alaniz PA-C      enoxaparin  40 mg Subcutaneous Daily Scarlet Alaniz PA-C      folic acid  1 mg Oral Daily Scarlet Alaniz PA-C      HYDROmorphone  0.5 mg Intravenous Q4H PRN Scarlet Alaniz PA-C      LORazepam  2 mg Intravenous Q6H PRN Makenzie Simpson PA-C      multivitamin-minerals  1 tablet Oral Daily Scarlet Alaniz PA-C      nicotine  1 patch Transdermal Daily Scarlet Alaniz PA-C      ondansetron  4 mg Intravenous Q6H PRN Scarlet Alaniz PA-C      oxyCODONE  5 mg Oral Q4H PRN Scarlet Alaniz PA-C      oxyCODONE  2.5 mg Oral Q4H PRN Scarlet Alaniz PA-C      polyethylene glycol  17 g Oral Daily PRN Scarlet Alaniz PA-C      thiamine  100 mg Oral Daily Scarlet Alaniz PA-C          Today, Patient Was Seen By: Makenzie Simpson PA-C    **Please Note: This note may have been constructed using a voice recognition system.**

## 2024-06-27 NOTE — PLAN OF CARE
Problem: COPING  Goal: Pt/Family able to verbalize concerns and demonstrate effective coping strategies  Description: INTERVENTIONS:  - Assist patient/family to identify coping skills, available support systems and cultural and spiritual values  - Provide emotional support, including active listening and acknowledgement of concerns of patient and caregivers  - Reduce environmental stimuli, as able  - Provide patient education  - Assess for spiritual pain/suffering and initiate spiritual care, including notification of Pastoral Care or audra based community as needed  - Assess effectiveness of coping strategies  Outcome: Progressing     Problem: SUBSTANCE USE/ABUSE  Goal: Will have no detox symptoms and will verbalize plan for changing substance-related behavior  Description: INTERVENTIONS:  - Monitor physical status and assess for symptoms of withdrawal  - Administer medication as ordered  - Provide emotional support with 1 on 1 interaction with staff  - Encourage recovery focused program/ addiction education  - Assess for verbalization of changing behaviors related to substance abuse  - Initiate consults and referrals as appropriate (Case Management, Spiritual Care, etc.)  Outcome: Progressing     Problem: PAIN - ADULT  Goal: Verbalizes/displays adequate comfort level or baseline comfort level  Description: Interventions:  - Encourage patient to monitor pain and request assistance  - Assess pain using appropriate pain scale  - Administer analgesics based on type and severity of pain and evaluate response  - Implement non-pharmacological measures as appropriate and evaluate response  - Consider cultural and social influences on pain and pain management  - Notify physician/advanced practitioner if interventions unsuccessful or patient reports new pain  Outcome: Progressing

## 2024-06-27 NOTE — ASSESSMENT & PLAN NOTE
Has not been taking any of her outpatient medications for over 6 months  Cleared by psychiatry for rehab placement, no indication for initiation of medications per psychiatry

## 2024-06-27 NOTE — CASE MANAGEMENT
Case Management Assessment & Discharge Planning Note    Patient name Alyson Madera  [place] [address] /E2 -* MRN [id number]  : 1965 Date 2024       Current Admission Date: 2024  Current Admission Diagnosis:Closed nondisplaced intertrochanteric fracture of left femur (HCC)   Patient Active Problem List    Diagnosis Date Noted Date Diagnosed    Severe protein-calorie malnutrition (HCC) 2024     Closed nondisplaced intertrochanteric fracture of left femur (HCC) 2024     Avascular necrosis of femur head, left (HCC) 2024     Avascular necrosis of bone of hip (HCC) 2024     Secondary esophageal varices with bleeding (HCC) 2024     Chronic obstructive pulmonary disease, unspecified COPD type (HCC) 2024     Alcohol use disorder, severe, dependence (HCC) 2024     Thrombocytopenia (HCC) 2024     Hypothyroidism 2023     Constipation 2023     Hyperlipidemia 2023     Hematemesis 2023     Acute blood loss anemia 2023     Hyponatremia 10/22/2019     Alcoholic cirrhosis of liver without ascites (HCC) 10/22/2019     Hypokalemia 10/21/2019     GI bleed 10/20/2019     Symptomatic anemia 10/20/2019     Schizoaffective disorder, bipolar type (HCC) 05/10/2016     Alcohol abuse 05/10/2016     Suicidal ideations 05/10/2016     Homicidal ideation 05/10/2016     Tobacco abuse 05/10/2016       LOS (days): 3  Geometric Mean LOS (GMLOS) (days): 2.8  Days to GMLOS:0.3     OBJECTIVE:    Risk of Unplanned Readmission Score: 17.33         Current admission status: Inpatient       Preferred Pharmacy:   [id number] - [place] - [address]  [address]  [place]   Phone: [phone] Fax: [phone]    [facility][id number] - [place] - [address]  [address]  [place] [place] [address]  Phone: [phone] Fax: [phone]    [facility] - [place] -  [address],  [address],  First [address] [facility] [address]  Phone: [phone] Fax: [phone]    [id number] - [place], [place] - [address]  [address] [address]  Phone: [phone] Fax: [phone]    Primary Care Provider: Miguel Duffy,     Primary Insurance: MEDICARE  Secondary Insurance:     ASSESSMENT:  Active Health Care Proxies       Pradeep Madera [facility] Care Representative - [place]   Primary Phone: [phone] (Mobile)                 Advance Directives  Does patient have a Health Care POA?: No  Was patient offered paperwork?: Yes (not interested)  Does patient currently have a Health Care decision maker?: Yes, please see Health Care Proxy section  Does patient have Advance Directives?: No  Was patient offered paperwork?: Yes (not interested)  Primary Contact: Pradeep Madera (brother) [phone]         Readmission Root Cause  30 Day Readmission: No    Patient Information  Admitted from:: Home  Mental Status: Alert  During Assessment patient was accompanied by: Not accompanied during assessment  Assessment information provided by:: Patient  Primary Caregiver: Self  Support Systems: Self, Family members  County of Residence: [place]  What city do you live in?: [place]  Home entry access options. Select all that apply.: Stairs  Number of steps to enter home.: 8  Do the steps have railings?: Yes  Type of Current Residence: Apartment  Floor Level: 2  Upon entering residence, is there a bedroom on the main floor (no further steps)?: Yes  Upon entering residence, is there a bathroom on the main floor (no further steps)?: Yes  Living Arrangements: Lives w/ Extended Family    Activities of Daily Living Prior to Admission  Functional Status: Independent  Completes ADLs independently?: Yes  Ambulates independently?: Yes (does uses a walker as needed)  Does patient use assisted devices?: Yes  Assisted Devices (DME) used: Walker  Does patient  "currently own DME?: Yes  What DME does the patient currently own?: Walker  Does patient have a history of Outpatient Therapy (PT/OT)?: Yes  Does the patient have a history of Short-Term Rehab?: Yes ([facility])  Does patient have a history of HHC?: Yes  Does patient currently have HHC?: No         Patient Information Continued  Income Source: SSI/SSD  Does patient have prescription coverage?: No (No prescription insurance showing. Pt reports not believing she has prescription coverage)  Does patient receive dialysis treatments?: No  Does patient have a history of substance abuse?: Yes  Historical substance use preference: Alcohol/ETOH  History of Withdrawal Symptoms: Delirium tremors  Is patient currently in treatment for substance abuse?: No. Treatment options provided (Pt is interested in D&A treatment once finished with short term rehab)  Does patient have a history of Mental Health Diagnosis?: Yes (Schizoaffective disorder, bipolar type)  Is patient receiving treatment for mental health?: No. Treatment options were provided. ([facility] not [facility]. Psych consult was completed and she was cleared with recommendation for \"no psychiatric medications at this time\")  Has patient received inpatient treatment related to mental health in the last 2 years?: Yes ([facility] 2022)         Means of Transportation  Means of Transport to Appts:: Family transport (family was transporting when she was residing with her sister)  [place] Application: Provided to Pt/Family      Social Determinants of Health (SDOH)      Flowsheet Row Most Recent Value   Housing Stability    In the last 12 months, was there a time when you were not able to pay the mortgage or rent on time? Y   In the past 12 months, how many times have you moved where you were living? 1   At any time in the past 12 months, were you homeless or living in a shelter (including now)? Y  [pt states she is currently now " homeless]   Transportation Needs    In the past 12 months, has lack of transportation kept you from medical appointments or from getting medications? yes   In the past 12 months, has lack of transportation kept you from meetings, work, or from getting things needed for daily living? Yes   Food Insecurity    Within the past 12 months, you worried that your food would run out before you got the money to buy more. Sometimes   Within the past 12 months, the food you bought just didn't last and you didn't have money to get more. Sometimes   Utilities    In the past 12 months has the electric, gas, oil, or water company threatened to shut off services in your home? No            DISCHARGE DETAILS:    Discharge planning discussed with:: Patient  Freedom of Choice: Yes  Comments - Freedom of Choice: Agreeable to short term rehab. No preference aside from she will not go to [facility] as she was there when it was [facility]. Referrals placed  CM contacted family/caregiver?: No- see comments (declined any contact)  Were Treatment Team discharge recommendations reviewed with patient/caregiver?: Yes  Did patient/caregiver verbalize understanding of patient care needs?: N/A- going to facility            Requested Home Health Care         Is the patient interested in HHC at discharge?: No    DME Referral Provided  Referral made for DME?: No    Other Referral/Resources/Interventions Provided:  Interventions: Short Term Rehab  Referral Comments: Referrals placed         Treatment Team Recommendation: Short Term Rehab                                            Additional Comments: CM met with pt at the bedside to complete initial assessment and discuss discharge planning. CM reviewed with pt the recommendation for STR being made by therapy. Pt is agreeable to STR but will not go to [facility] as that was previously [facility] and she was not happy with the experience she had there. LEWIS completed optioning paperwork with pt and emailed  paperwork to [facility] for review. Referrals have been made for STR within a 20 mile radius. Due to pt reporting she will not be returning to her sisters home at discharge, CM provided pt with resources for housing and shelters if needed along with food insecurity resources. Also provided pt with low cost hotels/motels. Pt reported she has stayed in motels before and will likely go back to that at time of discharge from short term rehab. [employer] application also provided for assistance with transportation. CM will continue to follow.

## 2024-06-28 LAB
ANION GAP SERPL CALCULATED.3IONS-SCNC: 7 MMOL/L (ref 4–13)
BUN SERPL-MCNC: 7 MG/DL (ref 5–25)
CALCIUM SERPL-MCNC: 9.4 MG/DL (ref 8.4–10.2)
CHLORIDE SERPL-SCNC: 107 MMOL/L (ref 96–108)
CO2 SERPL-SCNC: 22 MMOL/L (ref 21–32)
CREAT SERPL-MCNC: 0.4 MG/DL (ref 0.6–1.3)
ERYTHROCYTE [DISTWIDTH] IN BLOOD BY AUTOMATED COUNT: 16.5 % (ref 11.6–15.1)
GFR SERPL CREATININE-BSD FRML MDRD: 113 ML/MIN/1.73SQ M
GLUCOSE SERPL-MCNC: 106 MG/DL (ref 65–140)
HCT VFR BLD AUTO: 34.8 % (ref 34.8–46.1)
HGB BLD-MCNC: 11.4 G/DL (ref 11.5–15.4)
MCH RBC QN AUTO: 32.6 PG (ref 26.8–34.3)
MCHC RBC AUTO-ENTMCNC: 32.8 G/DL (ref 31.4–37.4)
MCV RBC AUTO: 99 FL (ref 82–98)
PLATELET # BLD AUTO: 195 THOUSANDS/UL (ref 149–390)
PMV BLD AUTO: 10.8 FL (ref 8.9–12.7)
POTASSIUM SERPL-SCNC: 3.9 MMOL/L (ref 3.5–5.3)
RBC # BLD AUTO: 3.5 MILLION/UL (ref 3.81–5.12)
SODIUM SERPL-SCNC: 136 MMOL/L (ref 135–147)
WBC # BLD AUTO: 5.84 THOUSAND/UL (ref 4.31–10.16)

## 2024-06-28 PROCEDURE — 99024 POSTOP FOLLOW-UP VISIT: CPT | Performed by: PHYSICIAN ASSISTANT

## 2024-06-28 PROCEDURE — 97110 THERAPEUTIC EXERCISES: CPT

## 2024-06-28 PROCEDURE — 97530 THERAPEUTIC ACTIVITIES: CPT

## 2024-06-28 PROCEDURE — 94762 N-INVAS EAR/PLS OXIMTRY CONT: CPT

## 2024-06-28 PROCEDURE — 97535 SELF CARE MNGMENT TRAINING: CPT

## 2024-06-28 PROCEDURE — 85027 COMPLETE CBC AUTOMATED: CPT | Performed by: PHYSICIAN ASSISTANT

## 2024-06-28 PROCEDURE — 80048 BASIC METABOLIC PNL TOTAL CA: CPT | Performed by: PHYSICIAN ASSISTANT

## 2024-06-28 PROCEDURE — 99232 SBSQ HOSP IP/OBS MODERATE 35: CPT | Performed by: STUDENT IN AN ORGANIZED HEALTH CARE EDUCATION/TRAINING PROGRAM

## 2024-06-28 PROCEDURE — 97116 GAIT TRAINING THERAPY: CPT

## 2024-06-28 RX ADMIN — THIAMINE HCL TAB 100 MG 100 MG: 100 TAB at 09:24

## 2024-06-28 RX ADMIN — ACETAMINOPHEN 975 MG: 325 TABLET, FILM COATED ORAL at 14:15

## 2024-06-28 RX ADMIN — ACETAMINOPHEN 975 MG: 325 TABLET, FILM COATED ORAL at 22:47

## 2024-06-28 RX ADMIN — MULTIPLE VITAMINS W/ MINERALS TAB 1 TABLET: TAB ORAL at 09:24

## 2024-06-28 RX ADMIN — FOLIC ACID 1 MG: 1 TABLET ORAL at 09:24

## 2024-06-28 RX ADMIN — ENOXAPARIN SODIUM 40 MG: 40 INJECTION SUBCUTANEOUS at 09:25

## 2024-06-28 RX ADMIN — ACETAMINOPHEN 975 MG: 325 TABLET, FILM COATED ORAL at 06:08

## 2024-06-28 NOTE — PLAN OF CARE
Problem: HEMATOLOGIC - ADULT  Goal: Maintains hematologic stability  Description: INTERVENTIONS  - Assess for signs and symptoms of bleeding or hemorrhage  - Monitor labs  - Administer supportive blood products/factors as ordered and appropriate  Outcome: Progressing     Problem: SKIN/TISSUE INTEGRITY - ADULT  Goal: Incision(s), wounds(s) or drain site(s) healing without S/S of infection  Description: INTERVENTIONS  - Assess and document dressing, incision, wound bed, drain sites and surrounding tissue  - Provide patient and family education  - Perform skin care/dressing changes every 6 hours  Outcome: Progressing     Problem: PAIN - ADULT  Goal: Verbalizes/displays adequate comfort level or baseline comfort level  Description: Interventions:  - Encourage patient to monitor pain and request assistance  - Assess pain using appropriate pain scale  - Administer analgesics based on type and severity of pain and evaluate response  - Implement non-pharmacological measures as appropriate and evaluate response  - Consider cultural and social influences on pain and pain management  - Notify physician/advanced practitioner if interventions unsuccessful or patient reports new pain  Outcome: Progressing     Problem: Potential for Falls  Goal: Patient will remain free of falls  Description: INTERVENTIONS:  - Educate patient/family on patient safety including physical limitations  - Instruct patient to call for assistance with activity   - Consult OT/PT to assist with strengthening/mobility   - Keep Call bell within reach  - Keep bed low and locked with side rails adjusted as appropriate  - Keep care items and personal belongings within reach  - Initiate and maintain comfort rounds  - Make Fall Risk Sign visible to staff  - Offer Toileting every 2 Hours, in advance of need  - Initiate/Maintain bed alarm  - Obtain necessary fall risk management equipment: walker  - Apply yellow socks and bracelet for high fall risk patients  -  Consider moving patient to room near nurses station  Outcome: Progressing     Problem: COPING  Goal: Pt/Family able to verbalize concerns and demonstrate effective coping strategies  Description: INTERVENTIONS:  - Assist patient/family to identify coping skills, available support systems and cultural and spiritual values  - Provide emotional support, including active listening and acknowledgement of concerns of patient and caregivers  - Reduce environmental stimuli, as able  - Provide patient education  - Assess for spiritual pain/suffering and initiate spiritual care, including notification of Pastoral Care or audra based community as needed  - Assess effectiveness of coping strategies  Outcome: Progressing  Goal: Will report anxiety at manageable levels  Description: INTERVENTIONS:  - Administer medication as ordered  - Teach and encourage coping skills  - Provide emotional support  - Assess patient/family for anxiety and ability to cope  Outcome: Progressing     Problem: SUBSTANCE USE/ABUSE  Goal: Will have no detox symptoms and will verbalize plan for changing substance-related behavior  Description: INTERVENTIONS:  - Monitor physical status and assess for symptoms of withdrawal  - Administer medication as ordered  - Provide emotional support with 1 on 1 interaction with staff  - Encourage recovery focused program/ addiction education  - Assess for verbalization of changing behaviors related to substance abuse  - Initiate consults and referrals as appropriate (Case Management, Spiritual Care, etc.)  Outcome: Progressing  Goal: By discharge, will develop insight into their chemical dependency and sustain motivation to continue in recovery  Description: INTERVENTIONS:  - Attends all daily group sessions and scheduled AA groups  - Actively practices coping skills through participation in the therapeutic community and adherence to program rules  - Reviews and completes assignments from individual treatment plan  -  Assist patient development of understanding of their personal cycle of addiction and relapse triggers  Outcome: Progressing  Goal: By discharge, patient will have ongoing treatment plan addressing chemical dependency  Description: INTERVENTIONS:  - Assist patient with resources and/or appointments for ongoing recovery based living  Outcome: Progressing

## 2024-06-28 NOTE — PROGRESS NOTES
"Progress Note - Orthopedics   Alyson AMARO Santy 59 y.o. female MRN: [id number]  Unit/Bed#: E2 -01 Encounter: [id number]    Assessment:  59 y.o. female POD 3 s/p L femur IMN on 6/25/24 with Dr. Lawrence      Plan:  Doing well  WBAT to LLE   PT/OT  Pain control prn  Lovenox/SCDs for DVT prophylaxis  Medical management per primary team  Patient is stable from ortho standpoint. She should follow up in the office in 2 weeks with Dr. Lawrence.     Subjective: Pt S&E.  Resting comfortably in bedside chair. Patient notes she is doing well overall. Patient states she does have pain and feel that the left hip becomes unstable after walking around her room. She notes she is comfortable at rest. Denies fevers, chills, distal numbness, or tingling at this time.       Vitals: Blood pressure 92/60, pulse 74, temperature 98.6 °F (37 °C), temperature source Temporal, resp. rate 18, height 5' 7\" (1.702 m), weight 53.3 kg (117 lb 8.1 oz), SpO2 98%.,Body mass index is 18.4 kg/m².      Intake/Output Summary (Last 24 hours) at 6/28/2024 1215  Last data filed at 6/28/2024 0917  Gross per 24 hour   Intake 120 ml   Output 550 ml   Net -430 ml       Invasive Devices       Peripheral Intravenous Line  Duration             Peripheral IV 06/24/24 Left;Ventral (anterior) Forearm 3 days    Peripheral IV 06/25/24 Right;Ventral (anterior) Forearm 3 days                    Ortho Exam: leftLE:  Drsg:  C/D/I, sensation grossly intact L4, L5, S1, palpable pedal pulse, EHL/AT/GS intact    Lab, Imaging and other studies: CBC:   Lab Results   Component Value Date    WBC 5.84 06/28/2024    HGB 11.4 (L) 06/28/2024    HCT 34.8 06/28/2024    MCV 99 (H) 06/28/2024     06/28/2024    RBC 3.50 (L) 06/28/2024    MCH 32.6 06/28/2024    MCHC 32.8 06/28/2024    RDW 16.5 (H) 06/28/2024    MPV 10.8 06/28/2024     CMP:   Lab Results   Component Value Date    SODIUM 136 06/28/2024     06/28/2024    CO2 22 06/28/2024    BUN 7 06/28/2024    CREATININE " 0.40 (L) 06/28/2024    CALCIUM 9.4 06/28/2024    EGFR 113 06/28/2024

## 2024-06-28 NOTE — PHYSICAL THERAPY NOTE
PHYSICAL THERAPY NOTE          Patient Name: Alyson Madera  Today's Date: 6/28/2024 06/28/24 1105   Note Type   Note Type Treatment   Pain Assessment   Pain Assessment Tool 0-10   Pain Score 5   Pain Location/Orientation Orientation: Left;Location: Leg   Effect of Pain on Daily Activities increased pain with mobility.   Hospital Pain Intervention(s) Cold applied;Ambulation/increased activity;Emotional support;Rest   Restrictions/Precautions   LLE Weight Bearing Per Order WBAT   Other Precautions WBS;Pain;Fall Risk;Bed Alarm;Chair Alarm   General   Chart Reviewed Yes   Family/Caregiver Present No   Cognition   Overall Cognitive Status WFL   Arousal/Participation Alert;Responsive;Cooperative   Attention Within functional limits   Orientation Level Oriented X4   Subjective   Subjective it hurts when I move it or when I get up to go to the bathroom.   Bed Mobility   Additional Comments pt  out of bed in recliner.   Transfers   Sit to Stand 4  Minimal assistance   Additional items Assist x 1;Armrests;Increased time required;Verbal cues   Stand to Sit 4  Minimal assistance   Additional items Assist x 1;Armrests;Increased time required;Verbal cues   Additional Comments verbal cues for hand placement and advancement of L le prior to sitting.   Ambulation/Elevation   Gait pattern Antalgic;Decreased L stance;Wide LEANN;Short stride;Excessively slow;Decreased heel strike;Decreased toe off;Improper Weight shift;Step to  (decreased R heel contact due to leg length discrepancey)   Gait Assistance 4  Minimal assist   Additional items Assist x 1;Verbal cues   Assistive Device Rolling walker   Distance 30' x1   Ambulation/Elevation Additional Comments verbal cues for le sequencing   Balance   Static Sitting Good   Dynamic Sitting Fair   Static Standing Fair -   Dynamic Standing Poor +   Ambulatory Poor +   Endurance Deficit   Endurance Deficit  Description pain, weakness, fatigue   Activity Tolerance   Activity Tolerance Patient limited by pain;Patient limited by fatigue   Exercises   Quad Sets Supine;AROM;Bilateral  (x 12 reps)   Heelslides Supine;AAROM;Left  (a/a hs)   Hip Abduction Supine;AAROM;Left  (x 12 reps.)   Hip Adduction Supine;AAROM;Left  (x 12 reps)   Knee AROM Short Arc Quad Supine;AROM;Left  (x12 reps.)   Knee AROM Long Arc Quad Sitting;10 reps;AROM;Left;Right   Ankle Pumps Sitting;Bilateral;AROM  (x12 reps.)   Assessment   Prognosis Fair   Problem List Decreased strength;Decreased range of motion;Decreased endurance;Impaired balance;Decreased mobility;Pain;Decreased safety awareness   Assessment Pt seen for PT treatment session this date with interventions consisting of bed mobility, transfer training, gait training, and HEP, and education provided as needed for safety and direction to improve functional mobility, safety awareness, and activity tolerance. Pt agreeable to PT treatment session upon arrival, pt found seated out of bed in recliner . At end of session, pt left seated out of bed in recliner with all needs in reach. In comparison to previous session, pt with improvement in activity tolerance, endurance, standing balance, ambulation distances, ambulatory balance, and functional mobility. Pt  is showing progress with improvements as noted. Overall pt is requiring less assistance for transfers and ambulation. Pt  progressed with ambulation distances to 30' x1 with min assist x1.  Pt demonstrates unsteady gait due antalgia, increased pain L le with weightbearing activity and R leg length discrepancy.   NO gross LOB.  Pt  performs L le aa-arom x 12 reps.  And R le arom exercises. Pt tolerated fair, rest breaks required due to increased pain in L groin and thigh.  Continue to recommend  level II moderate rehab resource intensity  at time of d/c in order to maximize pt's functional independence and safety w/ mobility. Pt continues to be  functioning below baseline level. PT will continue to see pt while here in order to address the deficits listed above and provide interventions consistent w/ POC in effort to achieve STGs.    The patient's [facility] Basic Mobility Inpatient Short Form Raw Score is 14. A raw score less than 16 suggests the patient may benefit from discharge to post-acute rehabilitation services. Please also refer to the recommendation of the Physical Therapist for safe discharge planning.   Goals   Patient Goals Get back to walking and being mobile again.   STG Expiration Date 07/06/24   PT Treatment Day 1   Plan   Treatment/Interventions LE strengthening/ROM;Functional transfer training;Therapeutic exercise;Endurance training;Patient/family training;Equipment eval/education;Bed mobility;Gait training   Progress Progressing toward goals   PT Frequency 4-6x/wk   Discharge Recommendation   Rehab Resource Intensity Level, PT II (Moderate Resource Intensity)   -PAC Basic Mobility Inpatient   Turning in Flat Bed Without Bedrails 2   Lying on Back to Sitting on Edge of Flat Bed Without Bedrails 2   Moving Bed to Chair 3   Standing Up From Chair Using Arms 3   Walk in Room 3   Climb 3-5 Stairs With Railing 1   Basic Mobility Inpatient Raw Score 14   Basic Mobility Standardized Score 35.55   [facility] Highest Level Of Mobility   [facility] Goal 4: Move to chair/commode     Danelle Adams, PTA
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
## 2024-06-28 NOTE — PLAN OF CARE
Problem: OCCUPATIONAL THERAPY ADULT  Goal: Performs self-care activities at highest level of function for planned discharge setting.  See evaluation for individualized goals.  Description: Treatment Interventions: ADL retraining, Functional transfer training, UE strengthening/ROM, Endurance training, Patient/family training, Equipment evaluation/education, Neuromuscular reeducation, Compensatory technique education, Energy conservation, Activityengagement          See flowsheet documentation for full assessment, interventions and recommendations.   Outcome: Progressing  Note: Limitation: Decreased ADL status, Decreased UE strength, Decreased Safe judgement during ADL, Decreased endurance, Decreased self-care trans, Decreased high-level ADLs  Prognosis: Good  Assessment: Pt greeted bedside for OT treatment on 06/28/24 focusing on maximizing independence with ADLs. Pt making great functional progress towards meeting goals. Pt completes functional transfers with min AX1 and functional mobility with mod AX1 with RW to/from [facility]. Pt engages in toileting routine at mod A, grooming at S level, and eating at PEÑA level. Pt continues to benefits from one-step simple commands. Limitations that impact functional performance include decreased ADL status, decreased UE ROM, decreased UE strength, decreased safe judgement during ADLs, decreased cognition, decreased endurance, decreased self care transfers, decreased high level ADLs, and pain. Occupational performance areas to address ADL retraining, functional transfer training, UE strengthening/ROM, endurance training, cognitive reorientation, Pt/caregiver education, equipment evaluation/education, compensatory technique education, energy conservation, and activity engagement . Pt would benefit from continued skilled OT services while in hospital to maximize independence with ADLs. Will continue to follow Pt's goals and progress. Pt would benefit from level II resources (moderate  intensity) upon DC to maximize safety and independence with ADLs and functional tasks of choice.     Rehab Resource Intensity Level, OT: II (Moderate Resource Intensity)
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
## 2024-06-28 NOTE — ASSESSMENT & PLAN NOTE
Cleared by psychiatry for rehab placement, no indication for initiation of medications per psychiatry  Not on outpatient medications for 6 month

## 2024-06-29 LAB
ANION GAP SERPL CALCULATED.3IONS-SCNC: 6 MMOL/L (ref 4–13)
BUN SERPL-MCNC: 10 MG/DL (ref 5–25)
CALCIUM SERPL-MCNC: 10 MG/DL (ref 8.4–10.2)
CHLORIDE SERPL-SCNC: 108 MMOL/L (ref 96–108)
CO2 SERPL-SCNC: 26 MMOL/L (ref 21–32)
CREAT SERPL-MCNC: 0.43 MG/DL (ref 0.6–1.3)
ERYTHROCYTE [DISTWIDTH] IN BLOOD BY AUTOMATED COUNT: 16.4 % (ref 11.6–15.1)
GFR SERPL CREATININE-BSD FRML MDRD: 110 ML/MIN/1.73SQ M
GLUCOSE SERPL-MCNC: 106 MG/DL (ref 65–140)
HCT VFR BLD AUTO: 35.5 % (ref 34.8–46.1)
HGB BLD-MCNC: 11.8 G/DL (ref 11.5–15.4)
MAGNESIUM SERPL-MCNC: 1.7 MG/DL (ref 1.9–2.7)
MCH RBC QN AUTO: 32 PG (ref 26.8–34.3)
MCHC RBC AUTO-ENTMCNC: 33.2 G/DL (ref 31.4–37.4)
MCV RBC AUTO: 96 FL (ref 82–98)
PLATELET # BLD AUTO: 209 THOUSANDS/UL (ref 149–390)
PMV BLD AUTO: 10.8 FL (ref 8.9–12.7)
POTASSIUM SERPL-SCNC: 3.7 MMOL/L (ref 3.5–5.3)
RBC # BLD AUTO: 3.69 MILLION/UL (ref 3.81–5.12)
SODIUM SERPL-SCNC: 140 MMOL/L (ref 135–147)
WBC # BLD AUTO: 5.8 THOUSAND/UL (ref 4.31–10.16)

## 2024-06-29 PROCEDURE — 99232 SBSQ HOSP IP/OBS MODERATE 35: CPT | Performed by: PHYSICIAN ASSISTANT

## 2024-06-29 PROCEDURE — 94762 N-INVAS EAR/PLS OXIMTRY CONT: CPT

## 2024-06-29 PROCEDURE — 83735 ASSAY OF MAGNESIUM: CPT | Performed by: STUDENT IN AN ORGANIZED HEALTH CARE EDUCATION/TRAINING PROGRAM

## 2024-06-29 PROCEDURE — 80048 BASIC METABOLIC PNL TOTAL CA: CPT | Performed by: STUDENT IN AN ORGANIZED HEALTH CARE EDUCATION/TRAINING PROGRAM

## 2024-06-29 PROCEDURE — 97116 GAIT TRAINING THERAPY: CPT

## 2024-06-29 PROCEDURE — 85027 COMPLETE CBC AUTOMATED: CPT | Performed by: STUDENT IN AN ORGANIZED HEALTH CARE EDUCATION/TRAINING PROGRAM

## 2024-06-29 PROCEDURE — 97110 THERAPEUTIC EXERCISES: CPT

## 2024-06-29 PROCEDURE — 97530 THERAPEUTIC ACTIVITIES: CPT

## 2024-06-29 RX ORDER — LORAZEPAM 2 MG/ML
1 INJECTION INTRAMUSCULAR EVERY 6 HOURS PRN
Status: DISCONTINUED | OUTPATIENT
Start: 2024-06-29 | End: 2024-07-03 | Stop reason: HOSPADM

## 2024-06-29 RX ORDER — ONDANSETRON 4 MG/1
4 TABLET, ORALLY DISINTEGRATING ORAL EVERY 6 HOURS PRN
Status: DISCONTINUED | OUTPATIENT
Start: 2024-06-29 | End: 2024-07-03 | Stop reason: HOSPADM

## 2024-06-29 RX ORDER — UREA 10 %
500 LOTION (ML) TOPICAL
Status: DISCONTINUED | OUTPATIENT
Start: 2024-06-29 | End: 2024-07-03 | Stop reason: HOSPADM

## 2024-06-29 RX ORDER — MAGNESIUM SULFATE HEPTAHYDRATE 40 MG/ML
2 INJECTION, SOLUTION INTRAVENOUS ONCE
Status: DISCONTINUED | OUTPATIENT
Start: 2024-06-29 | End: 2024-06-29

## 2024-06-29 RX ADMIN — THIAMINE HCL TAB 100 MG 100 MG: 100 TAB at 09:35

## 2024-06-29 RX ADMIN — MULTIPLE VITAMINS W/ MINERALS TAB 1 TABLET: TAB ORAL at 09:35

## 2024-06-29 RX ADMIN — FOLIC ACID 1 MG: 1 TABLET ORAL at 09:35

## 2024-06-29 RX ADMIN — Medication 500 MG: at 15:08

## 2024-06-29 RX ADMIN — ENOXAPARIN SODIUM 40 MG: 40 INJECTION SUBCUTANEOUS at 09:35

## 2024-06-29 RX ADMIN — ACETAMINOPHEN 975 MG: 325 TABLET, FILM COATED ORAL at 15:08

## 2024-06-29 RX ADMIN — ACETAMINOPHEN 975 MG: 325 TABLET, FILM COATED ORAL at 05:03
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
## 2024-06-29 NOTE — PHYSICAL THERAPY NOTE
PHYSICAL THERAPY NOTE          Patient Name: Alyson Madera  Today's Date: 6/29/2024 06/29/24 0852   Note Type   Note Type Treatment   Pain Assessment   Pain Assessment Tool 0-10   Pain Score 5   Pain Location/Orientation Location: Leg;Orientation: Left   Hospital Pain Intervention(s) Repositioned;Ambulation/increased activity;Emotional support;Rest;Cold applied   Restrictions/Precautions   RLE Weight Bearing Per Order   (h/o RLE leg length discrepency- Toe walks at baseline- denies heel lifts in shoes)   LLE Weight Bearing Per Order WBAT   Other Precautions WBS   General   Chart Reviewed Yes   Family/Caregiver Present No   Cognition   Overall Cognitive Status WFL   Arousal/Participation Alert;Responsive;Cooperative   Attention Within functional limits   Orientation Level Oriented X4   Memory Decreased recall of precautions   Following Commands Follows one step commands without difficulty   Subjective   Subjective i WAS SORE THIS aM BUT IT IS FEELING BETTER NOW.   Bed Mobility   Additional Comments PT  OUT OF BED UPON ARRIVAL.   Transfers   Sit to Stand 4  Minimal assistance   Additional items Assist x 1;Armrests;Increased time required;Verbal cues   Stand to Sit 4  Minimal assistance   Additional items Assist x 1;Armrests;Increased time required;Verbal cues   Toilet transfer 4  Minimal assistance   Additional items Assist x 1;Armrests;Increased time required;Verbal cues;Commode   Ambulation/Elevation   Gait pattern Antalgic;Wide LEANN;Decreased L stance;Step to;Excessively slow;Foward flexed;Decreased toe off;Decreased heel strike  (TOE WALKS ON r DUE TO LEG LENGTH DISCREPANCY, no HEEL LIFT FOR SHOE)   Gait Assistance 4  Minimal assist   Additional items Assist x 1;Verbal cues   Assistive Device Rolling walker   Distance 25' X2   Ambulation/Elevation Additional Comments VERBAL CUES FOR PROPER LE SEQUENCING AND STEP THROUGH GAIT  PATTERN   Balance   Static Sitting Good   Dynamic Sitting Fair +   Static Standing Fair  (W RW)   Dynamic Standing Poor +   Ambulatory Poor +   Endurance Deficit   Endurance Deficit Description PAIN, WEAKNESS, FATIGUE   Activity Tolerance   Activity Tolerance Patient limited by fatigue;Patient tolerated treatment well;Patient limited by pain   Nurse Made Aware rn, Hector   Exercises   Quad Sets Supine;15 reps;AROM;Bilateral   Heelslides Supine;AROM;Left  (X 12 REPS)   Hip Flexion Supine;AAROM;Left  (X 12 REPS)   Hip Abduction Supine;AAROM;Left  (X 12 REPS)   Hip Adduction Supine;AAROM;Left  (X 12 REPS)   Knee AROM Short Arc Quad Supine;AROM;Left  (X 12 REPS)   Ankle Pumps Sitting;15 reps;AROM;Bilateral   Equipment Use   Comments PT  TRANSFERS ON AND OFF BSC WITH MIN ASSIST X1, INCREASED TIME TO PERFORM TRANSITIONS,  PT ABLE TO PREFORM OWN LAKE ANAL CARE/ HYGIENE WITH SET- UP.   Assessment   Prognosis Good   Problem List Decreased strength;Decreased range of motion;Decreased endurance;Impaired balance;Decreased mobility;Decreased safety awareness;Pain;Decreased skin integrity   Assessment Pt seen for PT treatment session this date with interventions consisting of bed mobility, transfer training, gait training, and HEP, and education provided as needed for safety and direction to improve functional mobility, safety awareness, and activity tolerance. Pt agreeable to PT treatment session upon arrival, pt found seated out of bed in recliner . At end of session, pt left seated out of bed in recliner with all needs in reach. In comparison to previous session, pt with improvement in activity tolerance, endurance, standing balance, ambulation distances, ambulatory balance, and functional mobility. Pt is showing progress with improvements as noted. Overall pt is requiring less assistance for transfers and ambulation. Pt progressed with ambulation distances to 25' X2 with min assist x1. Pt demonstrates unsteady gait due  antalgia, increased pain L le with weightbearing activity and R leg length discrepancy. NO gross LOB. Pt performs L le aa-arom x 12-15 reps.. Pt tolerated fair, rest breaks required due to increased pain in L groin and thigh. Continue to recommend level II moderate rehab resource intensity at time of d/c in order to maximize pt's functional independence and safety w/ mobility. Pt continues to be functioning below baseline level. PT will continue to see pt while here in order to address the deficits listed above and provide interventions consistent w/ POC in effort to achieve STGs. The patient's AM-[facility] Basic Mobility Inpatient Short Form Raw Score is 16. A raw score less than  or equal to 16 suggests the patient may benefit from discharge to post-acute rehabilitation services. Please also refer to the recommendation of the Physical Therapist for safe discharge planning.   Goals   Patient Goals GET BACK TO WALKING.   STG Expiration Date 07/06/24   PT Treatment Day 2   Plan   Treatment/Interventions Functional transfer training;LE strengthening/ROM;Therapeutic exercise;Endurance training;Patient/family training;Equipment eval/education;Gait training;Spoke to nursing   Progress Progressing toward goals   PT Frequency 4-6x/wk   Discharge Recommendation   Rehab Resource Intensity Level, PT II (Moderate Resource Intensity)   AM-PAC Basic Mobility Inpatient   Turning in Flat Bed Without Bedrails 3   Lying on Back to Sitting on Edge of Flat Bed Without Bedrails 3   Moving Bed to Chair 3   Standing Up From Chair Using Arms 3   Walk in Room 3   Climb 3-5 Stairs With Railing 1   Basic Mobility Inpatient Raw Score 16   Basic Mobility Standardized Score 38.32   [facility] Highest Level Of Mobility   -HLM Goal 5: Stand one or more mins   -HLM Achieved 7: Walk 25 feet or more   Education   Education Provided Mobility training;Home exercise program;Assistive device   Patient Reinforcement needed;Demonstrates acceptance/verbal  understanding   End of Consult   Patient Position at End of Consult Bedside chair;All needs within reach   End of Consult Comments LE'S ELEVATED, ICE TO l HIP GROIN AND SCD'S TO B/L LE'S.   Danelle Adams, PTA
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
## 2024-06-29 NOTE — PLAN OF CARE
Problem: Potential for Falls  Goal: Patient will remain free of falls  Description: INTERVENTIONS:  - Educate patient/family on patient safety including physical limitations  - Instruct patient to call for assistance with activity   - Consult OT/PT to assist with strengthening/mobility   - Keep Call bell within reach  - Keep bed low and locked with side rails adjusted as appropriate  - Keep care items and personal belongings within reach  - Initiate and maintain comfort rounds  - Make Fall Risk Sign visible to staff  - Apply yellow socks and bracelet for high fall risk patients  - Consider moving patient to room near nurses station  Outcome: Progressing

## 2024-06-29 NOTE — PLAN OF CARE
Problem: Potential for Falls  Goal: Patient will remain free of falls  Description: INTERVENTIONS:  - Educate patient/family on patient safety including physical limitations  - Instruct patient to call for assistance with activity   - Consult OT/PT to assist with strengthening/mobility   - Keep Call bell within reach  - Keep bed low and locked with side rails adjusted as appropriate  - Keep care items and personal belongings within reach  - Initiate and maintain comfort rounds  - Make Fall Risk Sign visible to staff  - Offer Toileting every 2 Hours, in advance of need  - Initiate/Maintain bed alarm  - Obtain necessary fall risk management equipment.  - Apply yellow socks and bracelet for high fall risk patients  - Consider moving patient to room near nurses station  Outcome: Progressing     Problem: Prexisting or High Potential for Compromised Skin Integrity  Goal: Skin integrity is maintained or improved  Description: INTERVENTIONS:  - Identify patients at risk for skin breakdown  - Assess and monitor skin integrity  - Assess and monitor nutrition and hydration status  - Monitor labs   - Assess for incontinence   - Turn and reposition patient  - Assist with mobility/ambulation  - Relieve pressure over bony prominences  - Avoid friction and shearing  - Provide appropriate hygiene as needed including keeping skin clean and dry  - Evaluate need for skin moisturizer/barrier cream  - Collaborate with interdisciplinary team   - Patient/family teaching  - Consider wound care consult   Outcome: Progressing     Problem: COPING  Goal: Pt/Family able to verbalize concerns and demonstrate effective coping strategies  Description: INTERVENTIONS:  - Assist patient/family to identify coping skills, available support systems and cultural and spiritual values  - Provide emotional support, including active listening and acknowledgement of concerns of patient and caregivers  - Reduce environmental stimuli, as able  - Provide  patient education  - Assess for spiritual pain/suffering and initiate spiritual care, including notification of Pastoral Care or audra based community as needed  - Assess effectiveness of coping strategies  Outcome: Progressing  Goal: Will report anxiety at manageable levels  Description: INTERVENTIONS:  - Administer medication as ordered  - Teach and encourage coping skills  - Provide emotional support  - Assess patient/family for anxiety and ability to cope  Outcome: Progressing     Problem: SUBSTANCE USE/ABUSE  Goal: Will have no detox symptoms and will verbalize plan for changing substance-related behavior  Description: INTERVENTIONS:  - Monitor physical status and assess for symptoms of withdrawal  - Administer medication as ordered  - Provide emotional support with 1 on 1 interaction with staff  - Encourage recovery focused program/ addiction education  - Assess for verbalization of changing behaviors related to substance abuse  - Initiate consults and referrals as appropriate (Case Management, Spiritual Care, etc.)  Outcome: Progressing  Goal: By discharge, will develop insight into their chemical dependency and sustain motivation to continue in recovery  Description: INTERVENTIONS:  - Attends all daily group sessions and scheduled AA groups  - Actively practices coping skills through participation in the therapeutic community and adherence to program rules  - Reviews and completes assignments from individual treatment plan  - Assist patient development of understanding of their personal cycle of addiction and relapse triggers  Outcome: Progressing  Goal: By discharge, patient will have ongoing treatment plan addressing chemical dependency  Description: INTERVENTIONS:  - Assist patient with resources and/or appointments for ongoing recovery based living  Outcome: Progressing     Problem: PAIN - ADULT  Goal: Verbalizes/displays adequate comfort level or baseline comfort level  Description: Interventions:  -  Encourage patient to monitor pain and request assistance  - Assess pain using appropriate pain scale  - Administer analgesics based on type and severity of pain and evaluate response  - Implement non-pharmacological measures as appropriate and evaluate response  - Consider cultural and social influences on pain and pain management  - Notify physician/advanced practitioner if interventions unsuccessful or patient reports new pain  Outcome: Progressing     Problem: Nutrition/Hydration-ADULT  Goal: Nutrient/Hydration intake appropriate for improving, restoring or maintaining nutritional needs  Description: Monitor and assess patient's nutrition/hydration status for malnutrition. Collaborate with interdisciplinary team and initiate plan and interventions as ordered.  Monitor patient's weight and dietary intake as ordered or per policy. Utilize nutrition screening tool and intervene as necessary. Determine patient's food preferences and provide high-protein, high-caloric foods as appropriate.     INTERVENTIONS:  - Monitor oral intake, urinary output, labs, and treatment plans  - Assess nutrition and hydration status and recommend course of action  - Evaluate amount of meals eaten  - Assist patient with eating if necessary   - Allow adequate time for meals  - Recommend/ encourage appropriate diets, oral nutritional supplements, and vitamin/mineral supplements  - Order, calculate, and assess calorie counts as needed  - Recommend, monitor, and adjust tube feedings and TPN/PPN based on assessed needs  - Assess need for intravenous fluids  - Provide specific nutrition/hydration education as appropriate  - Include patient/family/caregiver in decisions related to nutrition  Outcome: Progressing     Problem: SKIN/TISSUE INTEGRITY - ADULT  Goal: Incision(s), wounds(s) or drain site(s) healing without S/S of infection  Description: INTERVENTIONS  - Assess and document dressing, incision, wound bed, drain sites and surrounding  tissue  - Provide patient and family education  - Perform skin care/dressing changes.  Outcome: Progressing     Problem: HEMATOLOGIC - ADULT  Goal: Maintains hematologic stability  Description: INTERVENTIONS  - Assess for signs and symptoms of bleeding or hemorrhage  - Monitor labs  - Administer supportive blood products/factors as ordered and appropriate  Outcome: Progressing

## 2024-06-30 PROCEDURE — 99232 SBSQ HOSP IP/OBS MODERATE 35: CPT | Performed by: PHYSICIAN ASSISTANT

## 2024-06-30 RX ADMIN — THIAMINE HCL TAB 100 MG 100 MG: 100 TAB at 08:02

## 2024-06-30 RX ADMIN — FOLIC ACID 1 MG: 1 TABLET ORAL at 08:01

## 2024-06-30 RX ADMIN — ACETAMINOPHEN 975 MG: 325 TABLET, FILM COATED ORAL at 13:29

## 2024-06-30 RX ADMIN — Medication 500 MG: at 15:07

## 2024-06-30 RX ADMIN — ACETAMINOPHEN 975 MG: 325 TABLET, FILM COATED ORAL at 06:22

## 2024-06-30 RX ADMIN — MULTIPLE VITAMINS W/ MINERALS TAB 1 TABLET: TAB ORAL at 08:02

## 2024-06-30 RX ADMIN — ENOXAPARIN SODIUM 40 MG: 40 INJECTION SUBCUTANEOUS at 08:01

## 2024-06-30 RX ADMIN — ACETAMINOPHEN 975 MG: 325 TABLET, FILM COATED ORAL at 21:04

## 2024-06-30 RX ADMIN — Medication 500 MG: at 06:22

## 2024-06-30 NOTE — PLAN OF CARE
Problem: Potential for Falls  Goal: Patient will remain free of falls  Description: INTERVENTIONS:  - Educate patient/family on patient safety including physical limitations  - Instruct patient to call for assistance with activity   - Consult OT/PT to assist with strengthening/mobility   - Keep Call bell within reach  - Keep bed low and locked with side rails adjusted as appropriate  - Keep care items and personal belongings within reach  - Initiate and maintain comfort rounds  - Make Fall Risk Sign visible to staff  - Offer Toileting every 2 Hours, in advance of need  - Initiate/Maintain bed alarm  - Obtain necessary fall risk management equipment  - Apply yellow socks and bracelet for high fall risk patients  - Consider moving patient to room near nurses station  Outcome: Progressing     Problem: Prexisting or High Potential for Compromised Skin Integrity  Goal: Skin integrity is maintained or improved  Description: INTERVENTIONS:  - Identify patients at risk for skin breakdown  - Assess and monitor skin integrity  - Assess and monitor nutrition and hydration status  - Monitor labs   - Assess for incontinence   - Turn and reposition patient  - Assist with mobility/ambulation  - Relieve pressure over bony prominences  - Avoid friction and shearing  - Provide appropriate hygiene as needed including keeping skin clean and dry  - Evaluate need for skin moisturizer/barrier cream  - Collaborate with interdisciplinary team   - Patient/family teaching  - Consider wound care consult   Outcome: Progressing     Problem: COPING  Goal: Pt/Family able to verbalize concerns and demonstrate effective coping strategies  Description: INTERVENTIONS:  - Assist patient/family to identify coping skills, available support systems and cultural and spiritual values  - Provide emotional support, including active listening and acknowledgement of concerns of patient and caregivers  - Reduce environmental stimuli, as able  - Provide patient  education  - Assess for spiritual pain/suffering and initiate spiritual care, including notification of Pastoral Care or audra based community as needed  - Assess effectiveness of coping strategies  Outcome: Progressing  Goal: Will report anxiety at manageable levels  Description: INTERVENTIONS:  - Administer medication as ordered  - Teach and encourage coping skills  - Provide emotional support  - Assess patient/family for anxiety and ability to cope  Outcome: Progressing     Problem: SUBSTANCE USE/ABUSE  Goal: Will have no detox symptoms and will verbalize plan for changing substance-related behavior  Description: INTERVENTIONS:  - Monitor physical status and assess for symptoms of withdrawal  - Administer medication as ordered  - Provide emotional support with 1 on 1 interaction with staff  - Encourage recovery focused program/ addiction education  - Assess for verbalization of changing behaviors related to substance abuse  - Initiate consults and referrals as appropriate (Case Management, Spiritual Care, etc.)  Outcome: Progressing  Goal: By discharge, will develop insight into their chemical dependency and sustain motivation to continue in recovery  Description: INTERVENTIONS:  - Attends all daily group sessions and scheduled AA groups  - Actively practices coping skills through participation in the therapeutic community and adherence to program rules  - Reviews and completes assignments from individual treatment plan  - Assist patient development of understanding of their personal cycle of addiction and relapse triggers  Outcome: Progressing  Goal: By discharge, patient will have ongoing treatment plan addressing chemical dependency  Description: INTERVENTIONS:  - Assist patient with resources and/or appointments for ongoing recovery based living  Outcome: Progressing     Problem: PAIN - ADULT  Goal: Verbalizes/displays adequate comfort level or baseline comfort level  Description: Interventions:  - Encourage  patient to monitor pain and request assistance  - Assess pain using appropriate pain scale  - Administer analgesics based on type and severity of pain and evaluate response  - Implement non-pharmacological measures as appropriate and evaluate response  - Consider cultural and social influences on pain and pain management  - Notify physician/advanced practitioner if interventions unsuccessful or patient reports new pain  Outcome: Progressing     Problem: Nutrition/Hydration-ADULT  Goal: Nutrient/Hydration intake appropriate for improving, restoring or maintaining nutritional needs  Description: Monitor and assess patient's nutrition/hydration status for malnutrition. Collaborate with interdisciplinary team and initiate plan and interventions as ordered.  Monitor patient's weight and dietary intake as ordered or per policy. Utilize nutrition screening tool and intervene as necessary. Determine patient's food preferences and provide high-protein, high-caloric foods as appropriate.     INTERVENTIONS:  - Monitor oral intake, urinary output, labs, and treatment plans  - Assess nutrition and hydration status and recommend course of action  - Evaluate amount of meals eaten  - Assist patient with eating if necessary   - Allow adequate time for meals  - Recommend/ encourage appropriate diets, oral nutritional supplements, and vitamin/mineral supplements  - Order, calculate, and assess calorie counts as needed  - Recommend, monitor, and adjust tube feedings and TPN/PPN based on assessed needs  - Assess need for intravenous fluids  - Provide specific nutrition/hydration education as appropriate  - Include patient/family/caregiver in decisions related to nutrition  Outcome: Progressing     Problem: SKIN/TISSUE INTEGRITY - ADULT  Goal: Incision(s), wounds(s) or drain site(s) healing without S/S of infection  Description: INTERVENTIONS  - Assess and document dressing, incision, wound bed, drain sites and surrounding tissue  -  Provide patient and family education  - Perform skin care/dressing changes as ordered  Outcome: Progressing     Problem: HEMATOLOGIC - ADULT  Goal: Maintains hematologic stability  Description: INTERVENTIONS  - Assess for signs and symptoms of bleeding or hemorrhage  - Monitor labs  - Administer supportive blood products/factors as ordered and appropriate  Outcome: Progressing
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
## 2024-06-30 NOTE — PLAN OF CARE
Problem: Prexisting or High Potential for Compromised Skin Integrity  Goal: Skin integrity is maintained or improved  Description: INTERVENTIONS:  - Identify patients at risk for skin breakdown  - Assess and monitor skin integrity  - Assess and monitor nutrition and hydration status  - Monitor labs   - Assess for incontinence   - Turn and reposition patient  - Assist with mobility/ambulation  - Relieve pressure over bony prominences  - Avoid friction and shearing  - Provide appropriate hygiene as needed including keeping skin clean and dry  - Evaluate need for skin moisturizer/barrier cream  - Collaborate with interdisciplinary team   - Patient/family teaching  - Consider wound care consult   Outcome: Progressing     Problem: SUBSTANCE USE/ABUSE  Goal: Will have no detox symptoms and will verbalize plan for changing substance-related behavior  Description: INTERVENTIONS:  - Monitor physical status and assess for symptoms of withdrawal  - Administer medication as ordered  - Provide emotional support with 1 on 1 interaction with staff  - Encourage recovery focused program/ addiction education  - Assess for verbalization of changing behaviors related to substance abuse  - Initiate consults and referrals as appropriate (Case Management, Spiritual Care, etc.)  Outcome: Progressing     Problem: SKIN/TISSUE INTEGRITY - ADULT  Goal: Incision(s), wounds(s) or drain site(s) healing without S/S of infection  Description: INTERVENTIONS  - Assess and document dressing, incision, wound bed, drain sites and surrounding tissue  - Provide patient and family education  - Perform skin care/dressing changes every shift     Problem: COPING  Goal: Pt/Family able to verbalize concerns and demonstrate effective coping strategies  Description: INTERVENTIONS:  - Assist patient/family to identify coping skills, available support systems and cultural and spiritual values  - Provide emotional support, including active listening and  acknowledgement of concerns of patient and caregivers  - Reduce environmental stimuli, as able  - Provide patient education  - Assess for spiritual pain/suffering and initiate spiritual care, including notification of Pastoral Care or audra based community as needed  - Assess effectiveness of coping strategies  Outcome: Progressing     Outcome: Progressing

## 2024-07-01 PROCEDURE — 97530 THERAPEUTIC ACTIVITIES: CPT

## 2024-07-01 PROCEDURE — 97110 THERAPEUTIC EXERCISES: CPT

## 2024-07-01 PROCEDURE — 97535 SELF CARE MNGMENT TRAINING: CPT

## 2024-07-01 PROCEDURE — 97116 GAIT TRAINING THERAPY: CPT

## 2024-07-01 PROCEDURE — 99232 SBSQ HOSP IP/OBS MODERATE 35: CPT | Performed by: FAMILY MEDICINE

## 2024-07-01 RX ADMIN — ACETAMINOPHEN 975 MG: 325 TABLET, FILM COATED ORAL at 05:21

## 2024-07-01 RX ADMIN — THIAMINE HCL TAB 100 MG 100 MG: 100 TAB at 08:34

## 2024-07-01 RX ADMIN — FOLIC ACID 1 MG: 1 TABLET ORAL at 08:34

## 2024-07-01 RX ADMIN — ACETAMINOPHEN 975 MG: 325 TABLET, FILM COATED ORAL at 21:32

## 2024-07-01 RX ADMIN — MULTIPLE VITAMINS W/ MINERALS TAB 1 TABLET: TAB ORAL at 08:34

## 2024-07-01 RX ADMIN — ENOXAPARIN SODIUM 40 MG: 40 INJECTION SUBCUTANEOUS at 08:34

## 2024-07-01 RX ADMIN — Medication 500 MG: at 07:17

## 2024-07-01 RX ADMIN — ACETAMINOPHEN 975 MG: 325 TABLET, FILM COATED ORAL at 15:00

## 2024-07-01 RX ADMIN — Medication 500 MG: at 15:43
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
## 2024-07-01 NOTE — PLAN OF CARE
Problem: Potential for Falls  Goal: Patient will remain free of falls  Description: INTERVENTIONS:  - Educate patient/family on patient safety including physical limitations  - Instruct patient to call for assistance with activity   - Consult OT/PT to assist with strengthening/mobility   - Keep Call bell within reach  - Keep bed low and locked with side rails adjusted as appropriate  - Keep care items and personal belongings within reach  - Initiate and maintain comfort rounds  - Make Fall Risk Sign visible to staff  - Offer Toileting every 2 Hours, in advance of need  - Initiate/Maintain bed alarm  - Obtain necessary fall risk management equipment:    - Apply yellow socks and bracelet for high fall risk patients  - Consider moving patient to room near nurses station  Outcome: Progressing     Problem: Prexisting or High Potential for Compromised Skin Integrity  Goal: Skin integrity is maintained or improved  Description: INTERVENTIONS:  - Identify patients at risk for skin breakdown  - Assess and monitor skin integrity  - Assess and monitor nutrition and hydration status  - Monitor labs   - Assess for incontinence   - Turn and reposition patient  - Assist with mobility/ambulation  - Relieve pressure over bony prominences  - Avoid friction and shearing  - Provide appropriate hygiene as needed including keeping skin clean and dry  - Evaluate need for skin moisturizer/barrier cream  - Collaborate with interdisciplinary team   - Patient/family teaching  - Consider wound care consult   Outcome: Progressing     Problem: COPING  Goal: Pt/Family able to verbalize concerns and demonstrate effective coping strategies  Description: INTERVENTIONS:  - Assist patient/family to identify coping skills, available support systems and cultural and spiritual values  - Provide emotional support, including active listening and acknowledgement of concerns of patient and caregivers  - Reduce environmental stimuli, as able  - Provide  patient education  - Assess for spiritual pain/suffering and initiate spiritual care, including notification of Pastoral Care or audra based community as needed  - Assess effectiveness of coping strategies  Outcome: Progressing  Goal: Will report anxiety at manageable levels  Description: INTERVENTIONS:  - Administer medication as ordered  - Teach and encourage coping skills  - Provide emotional support  - Assess patient/family for anxiety and ability to cope  Outcome: Progressing     Problem: SUBSTANCE USE/ABUSE  Goal: Will have no detox symptoms and will verbalize plan for changing substance-related behavior  Description: INTERVENTIONS:  - Monitor physical status and assess for symptoms of withdrawal  - Administer medication as ordered  - Provide emotional support with 1 on 1 interaction with staff  - Encourage recovery focused program/ addiction education  - Assess for verbalization of changing behaviors related to substance abuse  - Initiate consults and referrals as appropriate (Case Management, Spiritual Care, etc.)  Outcome: Progressing  Goal: By discharge, will develop insight into their chemical dependency and sustain motivation to continue in recovery  Description: INTERVENTIONS:  - Attends all daily group sessions and scheduled AA groups  - Actively practices coping skills through participation in the therapeutic community and adherence to program rules  - Reviews and completes assignments from individual treatment plan  - Assist patient development of understanding of their personal cycle of addiction and relapse triggers  Outcome: Progressing  Goal: By discharge, patient will have ongoing treatment plan addressing chemical dependency  Description: INTERVENTIONS:  - Assist patient with resources and/or appointments for ongoing recovery based living  Outcome: Progressing     Problem: PAIN - ADULT  Goal: Verbalizes/displays adequate comfort level or baseline comfort level  Description: Interventions:  -  Encourage patient to monitor pain and request assistance  - Assess pain using appropriate pain scale  - Administer analgesics based on type and severity of pain and evaluate response  - Implement non-pharmacological measures as appropriate and evaluate response  - Consider cultural and social influences on pain and pain management  - Notify physician/advanced practitioner if interventions unsuccessful or patient reports new pain  Outcome: Progressing     Problem: Nutrition/Hydration-ADULT  Goal: Nutrient/Hydration intake appropriate for improving, restoring or maintaining nutritional needs  Description: Monitor and assess patient's nutrition/hydration status for malnutrition. Collaborate with interdisciplinary team and initiate plan and interventions as ordered.  Monitor patient's weight and dietary intake as ordered or per policy. Utilize nutrition screening tool and intervene as necessary. Determine patient's food preferences and provide high-protein, high-caloric foods as appropriate.     INTERVENTIONS:  - Monitor oral intake, urinary output, labs, and treatment plans  - Assess nutrition and hydration status and recommend course of action  - Evaluate amount of meals eaten  - Assist patient with eating if necessary   - Allow adequate time for meals  - Recommend/ encourage appropriate diets, oral nutritional supplements, and vitamin/mineral supplements  - Order, calculate, and assess calorie counts as needed  - Recommend, monitor, and adjust tube feedings and TPN/PPN based on assessed needs  - Assess need for intravenous fluids  - Provide specific nutrition/hydration education as appropriate  - Include patient/family/caregiver in decisions related to nutrition  Outcome: Progressing     Problem: SKIN/TISSUE INTEGRITY - ADULT  Goal: Incision(s), wounds(s) or drain site(s) healing without S/S of infection  Description: INTERVENTIONS  - Assess and document dressing, incision, wound bed, drain sites and surrounding  tissue  - Provide patient and family education  - Perform skin care/dressing changes every shift  Outcome: Progressing     Problem: HEMATOLOGIC - ADULT  Goal: Maintains hematologic stability  Description: INTERVENTIONS  - Assess for signs and symptoms of bleeding or hemorrhage  - Monitor labs  - Administer supportive blood products/factors as ordered and appropriate  Outcome: Progressing

## 2024-07-01 NOTE — PLAN OF CARE
Problem: Potential for Falls  Goal: Patient will remain free of falls  Description: INTERVENTIONS:  - Educate patient/family on patient safety including physical limitations  - Instruct patient to call for assistance with activity   - Consult OT/PT to assist with strengthening/mobility   - Keep Call bell within reach  - Keep bed low and locked with side rails adjusted as appropriate  - Keep care items and personal belongings within reach  - Initiate and maintain comfort rounds  - Make Fall Risk Sign visible to staff  - Initiate/Maintain bed alarm  - Obtain necessary fall risk management equipment: bed alarm, call bell,  socks  - Apply yellow socks and bracelet for high fall risk patients  - Consider moving patient to room near nurses station  Outcome: Progressing     Problem: Prexisting or High Potential for Compromised Skin Integrity  Goal: Skin integrity is maintained or improved  Description: INTERVENTIONS:  - Identify patients at risk for skin breakdown  - Assess and monitor skin integrity  - Assess and monitor nutrition and hydration status  - Monitor labs   - Assess for incontinence   - Turn and reposition patient  - Assist with mobility/ambulation  - Relieve pressure over bony prominences  - Avoid friction and shearing  - Provide appropriate hygiene as needed including keeping skin clean and dry  - Evaluate need for skin moisturizer/barrier cream  - Collaborate with interdisciplinary team   - Patient/family teaching  - Consider wound care consult   Outcome: Progressing     Problem: COPING  Goal: Pt/Family able to verbalize concerns and demonstrate effective coping strategies  Description: INTERVENTIONS:  - Assist patient/family to identify coping skills, available support systems and cultural and spiritual values  - Provide emotional support, including active listening and acknowledgement of concerns of patient and caregivers  - Reduce environmental stimuli, as able  - Provide patient education  -  Assess for spiritual pain/suffering and initiate spiritual care, including notification of Pastoral Care or audra based community as needed  - Assess effectiveness of coping strategies  Outcome: Progressing     Problem: COPING  Goal: Will report anxiety at manageable levels  Description: INTERVENTIONS:  - Administer medication as ordered  - Teach and encourage coping skills  - Provide emotional support  - Assess patient/family for anxiety and ability to cope  Outcome: Progressing     Problem: PAIN - ADULT  Goal: Verbalizes/displays adequate comfort level or baseline comfort level  Description: Interventions:  - Encourage patient to monitor pain and request assistance  - Assess pain using appropriate pain scale  - Administer analgesics based on type and severity of pain and evaluate response  - Implement non-pharmacological measures as appropriate and evaluate response  - Consider cultural and social influences on pain and pain management  - Notify physician/advanced practitioner if interventions unsuccessful or patient reports new pain  Outcome: Progressing     Problem: Nutrition/Hydration-ADULT  Goal: Nutrient/Hydration intake appropriate for improving, restoring or maintaining nutritional needs  Description: Monitor and assess patient's nutrition/hydration status for malnutrition. Collaborate with interdisciplinary team and initiate plan and interventions as ordered.  Monitor patient's weight and dietary intake as ordered or per policy. Utilize nutrition screening tool and intervene as necessary. Determine patient's food preferences and provide high-protein, high-caloric foods as appropriate.     INTERVENTIONS:  - Monitor oral intake, urinary output, labs, and treatment plans  - Assess nutrition and hydration status and recommend course of action  - Evaluate amount of meals eaten  - Assist patient with eating if necessary   - Allow adequate time for meals  - Recommend/ encourage appropriate diets, oral nutritional  supplements, and vitamin/mineral supplements  - Order, calculate, and assess calorie counts as needed  - Recommend, monitor, and adjust tube feedings and TPN/PPN based on assessed needs  - Assess need for intravenous fluids  - Provide specific nutrition/hydration education as appropriate  - Include patient/family/caregiver in decisions related to nutrition  Outcome: Progressing     Problem: SKIN/TISSUE INTEGRITY - ADULT  Goal: Incision(s), wounds(s) or drain site(s) healing without S/S of infection  Description: INTERVENTIONS  - Assess and document dressing, incision, wound bed, drain sites and surrounding tissue  - Provide patient and family education  - Perform skin care/dressing changes every per order  Outcome: Progressing     Problem: HEMATOLOGIC - ADULT  Goal: Maintains hematologic stability  Description: INTERVENTIONS  - Assess for signs and symptoms of bleeding or hemorrhage  - Monitor labs  - Administer supportive blood products/factors as ordered and appropriate  Outcome: Progressing

## 2024-07-01 NOTE — CASE MANAGEMENT
Case Management Discharge Planning Note    Patient name Alyson Madera  [place] [address] /E2 -* MRN [id number]  : 1965 Date 2024       Current Admission Date: 2024  Current Admission Diagnosis:Closed nondisplaced intertrochanteric fracture of left femur (HCC)   Patient Active Problem List    Diagnosis Date Noted Date Diagnosed    Severe protein-calorie malnutrition (HCC) 2024     Closed nondisplaced intertrochanteric fracture of left femur (HCC) 2024     Avascular necrosis of femur head, left (HCC) 2024     Avascular necrosis of bone of hip (HCC) 2024     Secondary esophageal varices with bleeding (HCC) 2024     Chronic obstructive pulmonary disease, unspecified COPD type (HCC) 2024     Alcohol use disorder, severe, dependence (HCC) 2024     Thrombocytopenia (HCC) 2024     Hypothyroidism 2023     Constipation 2023     Hyperlipidemia 2023     Hematemesis 2023     Acute blood loss anemia 2023     Hyponatremia 10/22/2019     Alcoholic cirrhosis of liver without ascites (HCC) 10/22/2019     Hypokalemia 10/21/2019     GI bleed 10/20/2019     Symptomatic anemia 10/20/2019     Schizoaffective disorder, bipolar type (HCC) 05/10/2016     Alcohol abuse 05/10/2016     Suicidal ideations 05/10/2016     Homicidal ideation 05/10/2016     Tobacco abuse 05/10/2016       LOS (days): 7  Geometric Mean LOS (GMLOS) (days): 6.5  Days to GMLOS:-0.2     OBJECTIVE:  Risk of Unplanned Readmission Score: 18.4         Current admission status: Inpatient   Preferred Pharmacy:   [id number]  [place] - [address]  [address] [place]   Phone: [phone] Fax: [phone]    [facility] #[id number] - [place] - [address]  [address] [address]  Phone: [phone] Fax: [phone]    [facility] - [place] - [id number]  [address]   [facility],  First [address] [facility] [address]  Phone: [phone] Fax: [phone]    [id number] - [place] - [address]  [address] [address]  Phone: [phone] Fax: [phone]    Primary Care Provider: Miguel Duffy DO    Primary Insurance: MEDICARE  Secondary Insurance:     DISCHARGE DETAILS:    Discharge planning discussed with:: Patient  Freedom of Choice: Yes  Comments - Freedom of Choice: Reviewed only accepting being [place] [facility].. Pt agreeable  CM contacted family/caregiver?: Yes (contacted sister to get belongings delivered. Aside from that pt not interested in communication with sister)  Were Treatment Team discharge recommendations reviewed with patient/caregiver?: Yes  Did patient/caregiver verbalize understanding of patient care needs?: N/A- going to facility       Contacts  Patient Contacts: Danelle Madera  Relationship to Patient:: Family  Contact Method: Phone  Phone Number: [phone]  Reason/Outcome: Discharge Planning         DME Referral Provided  Referral made for DME?: No    Other Referral/Resources/Interventions Provided:  Interventions: Short Term Rehab         Treatment Team Recommendation: Short Term Rehab  Discharge Destination Plan:: Short Term Rehab                                         Additional Comments: CM received fax stating that psych consult was missing from paperwork that was faxed to the MH office and the [facility]. CM reviewed paperwork that was emailed and psych consult is included and is on page 30 through 38. CM called Ivelisse from [facility] and left VM. Also emailed Ivelisse orozco at [email or web address]. Awaiting response as this is needed for the [facility] letter which is needed for placement.

## 2024-07-01 NOTE — PLAN OF CARE
Problem: Potential for Falls  Goal: Patient will remain free of falls  Description: INTERVENTIONS:  - Educate patient/family on patient safety including physical limitations  - Instruct patient to call for assistance with activity   - Consult OT/PT to assist with strengthening/mobility   - Keep Call bell within reach  - Keep bed low and locked with side rails adjusted as appropriate  - Keep care items and personal belongings within reach  - Initiate and maintain comfort rounds  - Make Fall Risk Sign visible to staff  - Offer Toileting every 2 Hours, in advance of need  - Initiate/Maintain bed alarm  - Obtain necessary fall risk management equipment:    - Apply yellow socks and bracelet for high fall risk patients  - Consider moving patient to room near nurses station  Outcome: Progressing     Problem: Prexisting or High Potential for Compromised Skin Integrity  Goal: Skin integrity is maintained or improved  Description: INTERVENTIONS:  - Identify patients at risk for skin breakdown  - Assess and monitor skin integrity  - Assess and monitor nutrition and hydration status  - Monitor labs   - Assess for incontinence   - Turn and reposition patient  - Assist with mobility/ambulation  - Relieve pressure over bony prominences  - Avoid friction and shearing  - Provide appropriate hygiene as needed including keeping skin clean and dry  - Evaluate need for skin moisturizer/barrier cream  - Collaborate with interdisciplinary team   - Patient/family teaching  - Consider wound care consult   Outcome: Progressing     Problem: COPING  Goal: Pt/Family able to verbalize concerns and demonstrate effective coping strategies  Description: INTERVENTIONS:  - Assist patient/family to identify coping skills, available support systems and cultural and spiritual values  - Provide emotional support, including active listening and acknowledgement of concerns of patient and caregivers  - Reduce environmental stimuli, as able  - Provide  patient education  - Assess for spiritual pain/suffering and initiate spiritual care, including notification of Pastoral Care or audra based community as needed  - Assess effectiveness of coping strategies  Outcome: Progressing  Goal: Will report anxiety at manageable levels  Description: INTERVENTIONS:  - Administer medication as ordered  - Teach and encourage coping skills  - Provide emotional support  - Assess patient/family for anxiety and ability to cope  Outcome: Progressing     Problem: SUBSTANCE USE/ABUSE  Goal: Will have no detox symptoms and will verbalize plan for changing substance-related behavior  Description: INTERVENTIONS:  - Monitor physical status and assess for symptoms of withdrawal  - Administer medication as ordered  - Provide emotional support with 1 on 1 interaction with staff  - Encourage recovery focused program/ addiction education  - Assess for verbalization of changing behaviors related to substance abuse  - Initiate consults and referrals as appropriate (Case Management, Spiritual Care, etc.)  Outcome: Progressing  Goal: By discharge, will develop insight into their chemical dependency and sustain motivation to continue in recovery  Description: INTERVENTIONS:  - Attends all daily group sessions and scheduled AA groups  - Actively practices coping skills through participation in the therapeutic community and adherence to program rules  - Reviews and completes assignments from individual treatment plan  - Assist patient development of understanding of their personal cycle of addiction and relapse triggers  Outcome: Progressing  Goal: By discharge, patient will have ongoing treatment plan addressing chemical dependency  Description: INTERVENTIONS:  - Assist patient with resources and/or appointments for ongoing recovery based living  Outcome: Progressing     Problem: PAIN - ADULT  Goal: Verbalizes/displays adequate comfort level or baseline comfort level  Description: Interventions:  -  Encourage patient to monitor pain and request assistance  - Assess pain using appropriate pain scale  - Administer analgesics based on type and severity of pain and evaluate response  - Implement non-pharmacological measures as appropriate and evaluate response  - Consider cultural and social influences on pain and pain management  - Notify physician/advanced practitioner if interventions unsuccessful or patient reports new pain  Outcome: Progressing     Problem: Nutrition/Hydration-ADULT  Goal: Nutrient/Hydration intake appropriate for improving, restoring or maintaining nutritional needs  Description: Monitor and assess patient's nutrition/hydration status for malnutrition. Collaborate with interdisciplinary team and initiate plan and interventions as ordered.  Monitor patient's weight and dietary intake as ordered or per policy. Utilize nutrition screening tool and intervene as necessary. Determine patient's food preferences and provide high-protein, high-caloric foods as appropriate.     INTERVENTIONS:  - Monitor oral intake, urinary output, labs, and treatment plans  - Assess nutrition and hydration status and recommend course of action  - Evaluate amount of meals eaten  - Assist patient with eating if necessary   - Allow adequate time for meals  - Recommend/ encourage appropriate diets, oral nutritional supplements, and vitamin/mineral supplements  - Order, calculate, and assess calorie counts as needed  - Recommend, monitor, and adjust tube feedings and TPN/PPN based on assessed needs  - Assess need for intravenous fluids  - Provide specific nutrition/hydration education as appropriate  - Include patient/family/caregiver in decisions related to nutrition  Outcome: Progressing     Problem: SKIN/TISSUE INTEGRITY - ADULT  Goal: Incision(s), wounds(s) or drain site(s) healing without S/S of infection  Description: INTERVENTIONS  - Assess and document dressing, incision, wound bed, drain sites and surrounding  tissue  - Provide patient and family education  - Perform skin care/dressing changes every day  Outcome: Progressing     Problem: HEMATOLOGIC - ADULT  Goal: Maintains hematologic stability  Description: INTERVENTIONS  - Assess for signs and symptoms of bleeding or hemorrhage  - Monitor labs  - Administer supportive blood products/factors as ordered and appropriate  Outcome: Progressing
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
## 2024-07-01 NOTE — OCCUPATIONAL THERAPY NOTE
Occupational Therapy Progress Note     Patient Name: Alyson Madera  Today's Date: 7/1/2024  Problem List  Principal Problem:    Closed nondisplaced intertrochanteric fracture of left femur (HCC)  Active Problems:    Schizoaffective disorder, bipolar type (HCC)    Alcohol use disorder, severe, dependence (HCC)    Avascular necrosis of femur head, left (HCC)    Severe protein-calorie malnutrition (HCC)        07/01/24 1406   OT Last Visit   OT Visit Date 07/01/24   Note Type   Note Type Treatment   Pain Assessment   Pain Assessment Tool 0-10   Pain Score 6   Pain Location/Orientation Orientation: Left;Location: Hip;Location: Leg   Restrictions/Precautions   Weight Bearing Precautions Per Order Yes   LLE Weight Bearing Per Order WBAT   Other Precautions WBS;Pain;Fall Risk   ADL   Where Assessed Chair   Grooming Assistance 5  Supervision/Setup   Grooming Deficit Setup;Wash/dry hands;Wash/dry face   Grooming Comments standing at sink   UB Bathing Assistance 5  Supervision/Setup   UB Bathing Deficit Setup;Verbal cueing;Supervision/safety   LB Dressing Assistance 3  Moderate Assistance   LB Dressing Deficit Setup;Verbal cueing;Supervision/safety;Increased time to complete;Don/doff L sock;Don/doff R sock   LB Dressing Comments able to doff socks independently but required assist to don   Toileting Assistance  5  Supervision/Setup   Toileting Deficit Setup;Verbal cueing;Supervison/safety;Increased time to complete;Grab bar use   Functional Standing Tolerance   Time 2-3 min   Activity mobility. self care tasks.   Comments RW for support.   Bed Mobility   Supine to Sit 5  Supervision   Additional items HOB elevated;Bedrails   Sit to Supine 5  Supervision   Additional items HOB elevated;Bedrails   Transfers   Sit to Stand 4  Minimal assistance  (initially Wilmer improving to Supervision.)   Additional items Assist x 1;Armrests;Increased time required;Verbal cues   Stand to Sit 5  Supervision   Additional items Assist x  1;Increased time required;Verbal cues   Toilet transfer   (Contact guard assist.)   Additional items Assist x 1;Increased time required;Verbal cues;Standard toilet   Functional Mobility   Functional Mobility   (close supervision)   Additional Comments functional distances in room. Rw   Additional items Rolling walker   Therapeutic Excerise-Strength   UE Strength Yes   Right Upper Extremity- Strength   R Shoulder Flexion;ABduction;Extension;Horizontal ABduction   R Elbow Elbow flexion;Elbow extension   R Position Seated   R Weight/Reps/Sets 3 x10   Left Upper Extremity-Strength   L Shoulder ABduction;Flexion;Extension;Horizontal ABduction   L Position Seated   L Weights/Reps/Sets 3 X10   Cognition   Overall Cognitive Status WFL   Arousal/Participation Alert;Responsive;Cooperative   Attention Within functional limits   Orientation Level Oriented X4   Memory Decreased recall of precautions   Following Commands Follows one step commands without difficulty   Comments   (Pleasant and cooperative.)   Activity Tolerance   Activity Tolerance Patient tolerated treatment well;Patient limited by pain   Medical Staff Made Aware RN   Assessment   Assessment Pt seen for skilled OT tx this date. Tx focused on improving strength, activity tolerance and balance, safety awareness to increase independence with self care tasks. Pt tolerated session well. Pt was limited by weakness and pain. Agreeable to skilled OT. Pt performed UB dressing/ bathing with supervision, LB dressing / bathing Chloe , Toileting supervision,  bed mobility supervision, transfers Chloe, mobility with RW supervision. Pt demonstrated fair- standing balance during functional tasks. Pt demonstrated ability to safely and appropriately attend to all tasks during session. Pt required minimal verbal cuing during session to safely complete tasks. Pt completed 3x10 of various BUE exercises to increase strength. Performed while seated. Tolerated well. Current OT DC  recommendations for pt is level 2 resources.   Plan   Treatment Interventions ADL retraining;Functional transfer training;UE strengthening/ROM;Endurance training;Patient/family training;Equipment evaluation/education;Neuromuscular reeducation;Compensatory technique education;Energy conservation;Activityengagement   Goal Expiration Date 07/10/24   OT Treatment Day 2   OT Frequency 3-5x/wk   Discharge Recommendation   Rehab Resource Intensity Level, OT II (Moderate Resource Intensity)   Additional Comments  The patient's raw score on the AM-PAC Daily Activity Inpatient Short Form is 20. A raw score of greater than or equal to 19 suggests the patient may benefit from discharge to home. Please refer to the recommendation of the Occupational Therapist for safe discharge planning.   AM-PAC Daily Activity Inpatient   Lower Body Dressing 3   Bathing 3   Toileting 3   Upper Body Dressing 3   Grooming 4   Eating 4   Daily Activity Raw Score 20   Daily Activity Standardized Score (Calc for Raw Score >=11) 42.03   AM-PAC Applied Cognition Inpatient   Following a Speech/Presentation 4   Understanding Ordinary Conversation 4   Taking Medications 4   Remembering Where Things Are Placed or Put Away 4   Remembering List of 4-5 Errands 4   Taking Care of Complicated Tasks 3   Applied Cognition Raw Score 23   Applied Cognition Standardized Score 53.08   Consuelo Vazquez, OT     stage II

## 2024-07-01 NOTE — PLAN OF CARE
Problem: PHYSICAL THERAPY ADULT  Goal: Performs mobility at highest level of function for planned discharge setting.  See evaluation for individualized goals.  Description: Treatment/Interventions: Functional transfer training, LE strengthening/ROM, Elevations, Therapeutic exercise, Endurance training, Patient/family training, Equipment eval/education, Bed mobility, Gait training, Spoke to nursing, OT  Equipment Recommended: Walker (pt has)       See flowsheet documentation for full assessment, interventions and recommendations.  Outcome: Progressing  Note: Prognosis: Good  Problem List: Decreased strength, Decreased range of motion, Decreased endurance, Impaired balance, Decreased mobility, Decreased skin integrity, Orthopedic restrictions, Pain  Assessment: Pt seen for PT treatment session this date with interventions consisting of transfer training, gait training, and HEP, and education provided as needed for safety and direction to improve functional mobility, safety awareness, and activity tolerance. Pt agreeable to PT treatment session upon arrival, pt found SEATED OUT OF BED IN RECLINER . At end of session, pt left  SEATED OUT OF BED IN RECLINER with all needs in reach. In comparison to previous session, pt with improvement in activity tolerance, endurance, standing balance, ambulation distances, ambulatory balance, AM- pac score, and functional mobility. Pt is showing progress with improvements as noted. Pt progressed with ambulation distances to 40' X2 with min- CG assist x1. Pt demonstrates unsteady gait due antalgia, increased pain L le with weightbearing activity and R leg length discrepancy. NO gross LOB. Pt performs L le aa-arom x 10-15 reps.. Pt tolerated fair, rest breaks required due to increased pain in L groin and thigh.  PT performs transfers with min assist x1 sit to stand and min- cg stand to sit with verbal cues for advancement of L le with stand to sit.  Continue to recommend  LEVEL II  MODERATE REHAB RESOURCE INTENSITY  at time of d/c in order to maximize pt's functional independence and safety w/ mobility. Pt continues to be functioning below baseline level. PT will continue to see pt while here in order to address the deficits listed above and provide interventions consistent w/ POC in effort to achieve STGs.  Barriers to Discharge: Inaccessible home environment, Decreased caregiver support  Barriers to Discharge Comments: pt states she will not be returning to sister's home upon D/C so ? availability of support and accessiblity of new home environment  Rehab Resource Intensity Level, PT: II (Moderate Resource Intensity)    See flowsheet documentation for full assessment.
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
## 2024-07-01 NOTE — PHYSICAL THERAPY NOTE
PHYSICAL THERAPY NOTE          Patient Name: Alyson Madera  Today's Date: 7/1/2024 07/01/24 1100   Note Type   Note Type Treatment   Pain Assessment   Pain Assessment Tool 0-10   Pain Score 5   Pain Location/Orientation Orientation: Left;Location: Hip;Location: Leg   Hospital Pain Intervention(s) Repositioned;Cold applied;Ambulation/increased activity;Emotional support;Rest;Elevated   Restrictions/Precautions   Weight Bearing Precautions Per Order Yes   LLE Weight Bearing Per Order WBAT   Other Precautions WBS;Pain;Fall Risk   General   Chart Reviewed Yes   Family/Caregiver Present No   Cognition   Overall Cognitive Status WFL   Arousal/Participation Alert;Responsive;Cooperative   Attention Within functional limits   Orientation Level Oriented X4   Subjective   Subjective i've been sitting most of the day. i only got up to go to the bathroom.  it hurt today.   Bed Mobility   Additional Comments pt  out of bed in recliner upon arrival.   Transfers   Sit to Stand 4  Minimal assistance   Additional items Assist x 1;Armrests;Increased time required   Stand to Sit 4  Minimal assistance   Additional items Assist x 1;Armrests;Increased time required;Verbal cues  (min- cg verbal cues for positioning and advancement of L le.)   Additional Comments w rw   Ambulation/Elevation   Gait pattern Antalgic;Forward Flexion;Excessively slow;Decreased toe off;Decreased heel strike;Decreased L stance  (pt  toe walks on R due to leg length discrepancy)   Gait Assistance 4  Minimal assist  (min- cg)   Additional items Assist x 1   Assistive Device Rolling walker   Distance 40' x2   Ambulation/Elevation Additional Comments VERBAL CUES FOR PROPER LE SEQUENCING AND STEP THROUGH GAIT PATTERN   AND PROPER ADVANCEMENT OF RW.   Balance   Dynamic Standing Fair -  (W RW)   Ambulatory Fair -  (W RW)   Endurance Deficit   Endurance Deficit Yes   Endurance Deficit  Description FATIGUE, PAIN   Activity Tolerance   Activity Tolerance Patient limited by pain;Patient limited by fatigue;Patient tolerated treatment well   Exercises   Quad Sets Supine;15 reps;AROM;Bilateral   Heelslides Supine;15 reps;AROM;Left   Glute Sets Supine;15 reps;AROM;Bilateral   Hip Flexion Supine;10 reps;AAROM;Left  (slr)   Hip Abduction Supine;15 reps;AAROM;Left   Hip Adduction Supine;15 reps;AAROM;Left   Knee AROM Short Arc Quad Supine;15 reps;AROM;Left   Ankle Pumps Supine;15 reps;AROM;Bilateral   Assessment   Prognosis Good   Problem List Decreased strength;Decreased range of motion;Decreased endurance;Impaired balance;Decreased mobility;Decreased skin integrity;Orthopedic restrictions;Pain   Assessment Pt seen for PT treatment session this date with interventions consisting of transfer training, gait training, and HEP, and education provided as needed for safety and direction to improve functional mobility, safety awareness, and activity tolerance. Pt agreeable to PT treatment session upon arrival, pt found SEATED OUT OF BED IN RECLINER . At end of session, pt left  SEATED OUT OF BED IN RECLINER with all needs in reach. In comparison to previous session, pt with improvement in activity tolerance, endurance, standing balance, ambulation distances, ambulatory balance, AM- pac score, and functional mobility. Pt is showing progress with improvements as noted. Pt progressed with ambulation distances to 40' X2 with min- CG assist x1. Pt demonstrates unsteady gait due antalgia, increased pain L le with weightbearing activity and R leg length discrepancy. NO gross LOB. Pt performs L le aa-arom x 10-15 reps.. Pt tolerated fair, rest breaks required due to increased pain in L groin and thigh.  PT performs transfers with min assist x1 sit to stand and min- cg stand to sit with verbal cues for advancement of L le with stand to sit.  Continue to recommend  LEVEL II MODERATE REHAB RESOURCE INTENSITY  at time of d/c  in order to maximize pt's functional independence and safety w/ mobility. Pt continues to be functioning below baseline level. PT will continue to see pt while here in order to address the deficits listed above and provide interventions consistent w/ POC in effort to achieve STGs.   Goals   Patient Goals Get back to walkng.   STG Expiration Date 07/06/24   PT Treatment Day 3   Plan   Treatment/Interventions Functional transfer training;LE strengthening/ROM;Therapeutic exercise;Endurance training;Patient/family training;Equipment eval/education;Gait training   Progress Progressing toward goals   PT Frequency 4-6x/wk   Discharge Recommendation   Rehab Resource Intensity Level, PT II (Moderate Resource Intensity)   Additional Comments The patient's AM-PAC Basic Mobility Inpatient Short Form Raw Score is 17. A raw score greater than 16 suggests the patient may benefit from discharge to home. Please also refer to the recommendation of the Physical Therapist for safe discharge planning.   AM-PAC Basic Mobility Inpatient   Turning in Flat Bed Without Bedrails 3   Lying on Back to Sitting on Edge of Flat Bed Without Bedrails 3   Moving Bed to Chair 3   Standing Up From Chair Using Arms 3   Walk in Room 3   Climb 3-5 Stairs With Railing 2   Basic Mobility Inpatient Raw Score 17   Basic Mobility Standardized Score 39.67   [facility] Highest Level Of Mobility   -HL Goal 5: Stand one or more mins   -HLM Achieved 7: Walk 25 feet or more   Education   Education Provided Mobility training;Home exercise program;Assistive device   Patient Reinforcement needed;Demonstrates acceptance/verbal understanding   End of Consult   Patient Position at End of Consult Bedside chair;All needs within reach   End of Consult Comments LE'S ELEVATED, ICE TO l HIP GROIN   Danelle Adams, PTA

## 2024-07-01 NOTE — PLAN OF CARE
Problem: OCCUPATIONAL THERAPY ADULT  Goal: Performs self-care activities at highest level of function for planned discharge setting.  See evaluation for individualized goals.  Description: Treatment Interventions: ADL retraining, Functional transfer training, UE strengthening/ROM, Endurance training, Patient/family training, Equipment evaluation/education, Neuromuscular reeducation, Compensatory technique education, Energy conservation, Activityengagement          See flowsheet documentation for full assessment, interventions and recommendations.   Outcome: Progressing  Note: Limitation: Decreased ADL status, Decreased UE strength, Decreased Safe judgement during ADL, Decreased endurance, Decreased self-care trans, Decreased high-level ADLs  Prognosis: Good  Assessment: Pt seen for skilled OT tx this date. Tx focused on improving strength, activity tolerance and balance, safety awareness to increase independence with self care tasks. Pt tolerated session well. Pt was limited by weakness and pain. Agreeable to skilled OT. Pt performed UB dressing/ bathing with supervision, LB dressing / bathing Chloe , Toileting supervision,  bed mobility supervision, transfers Chloe, mobility with RW supervision. Pt demonstrated fair- standing balance during functional tasks. Pt demonstrated ability to safely and appropriately attend to all tasks during session. Pt required minimal verbal cuing during session to safely complete tasks. Pt completed 3x10 of various BUE exercises to increase strength. Performed while seated. Tolerated well. Current OT DC recommendations for pt is level 2 resources.     Rehab Resource Intensity Level, OT: II (Moderate Resource Intensity)

## 2024-07-02 ENCOUNTER — APPOINTMENT (INPATIENT)
Dept: RADIOLOGY | Facility: HOSPITAL | Age: 59
DRG: 480 | End: 2024-07-02
Payer: MEDICARE

## 2024-07-02 LAB
ANION GAP SERPL CALCULATED.3IONS-SCNC: 8 MMOL/L (ref 4–13)
BASOPHILS # BLD AUTO: 0.04 THOUSANDS/ÂΜL (ref 0–0.1)
BASOPHILS NFR BLD AUTO: 1 % (ref 0–1)
BUN SERPL-MCNC: 11 MG/DL (ref 5–25)
CALCIUM SERPL-MCNC: 9.7 MG/DL (ref 8.4–10.2)
CHLORIDE SERPL-SCNC: 106 MMOL/L (ref 96–108)
CO2 SERPL-SCNC: 25 MMOL/L (ref 21–32)
CREAT SERPL-MCNC: 0.42 MG/DL (ref 0.6–1.3)
EOSINOPHIL # BLD AUTO: 0.1 THOUSAND/ÂΜL (ref 0–0.61)
EOSINOPHIL NFR BLD AUTO: 2 % (ref 0–6)
ERYTHROCYTE [DISTWIDTH] IN BLOOD BY AUTOMATED COUNT: 16.5 % (ref 11.6–15.1)
GFR SERPL CREATININE-BSD FRML MDRD: 111 ML/MIN/1.73SQ M
GLUCOSE SERPL-MCNC: 90 MG/DL (ref 65–140)
HCT VFR BLD AUTO: 37.5 % (ref 34.8–46.1)
HGB BLD-MCNC: 12.1 G/DL (ref 11.5–15.4)
IMM GRANULOCYTES # BLD AUTO: 0.01 THOUSAND/UL (ref 0–0.2)
IMM GRANULOCYTES NFR BLD AUTO: 0 % (ref 0–2)
LYMPHOCYTES # BLD AUTO: 1.3 THOUSANDS/ÂΜL (ref 0.6–4.47)
LYMPHOCYTES NFR BLD AUTO: 23 % (ref 14–44)
MAGNESIUM SERPL-MCNC: 2 MG/DL (ref 1.9–2.7)
MCH RBC QN AUTO: 31.8 PG (ref 26.8–34.3)
MCHC RBC AUTO-ENTMCNC: 32.3 G/DL (ref 31.4–37.4)
MCV RBC AUTO: 98 FL (ref 82–98)
MONOCYTES # BLD AUTO: 0.65 THOUSAND/ÂΜL (ref 0.17–1.22)
MONOCYTES NFR BLD AUTO: 12 % (ref 4–12)
NEUTROPHILS # BLD AUTO: 3.5 THOUSANDS/ÂΜL (ref 1.85–7.62)
NEUTS SEG NFR BLD AUTO: 62 % (ref 43–75)
NRBC BLD AUTO-RTO: 0 /100 WBCS
PLATELET # BLD AUTO: 244 THOUSANDS/UL (ref 149–390)
PMV BLD AUTO: 10.8 FL (ref 8.9–12.7)
POTASSIUM SERPL-SCNC: 4.2 MMOL/L (ref 3.5–5.3)
RBC # BLD AUTO: 3.81 MILLION/UL (ref 3.81–5.12)
SODIUM SERPL-SCNC: 139 MMOL/L (ref 135–147)
WBC # BLD AUTO: 5.6 THOUSAND/UL (ref 4.31–10.16)

## 2024-07-02 PROCEDURE — 83735 ASSAY OF MAGNESIUM: CPT | Performed by: FAMILY MEDICINE

## 2024-07-02 PROCEDURE — 99232 SBSQ HOSP IP/OBS MODERATE 35: CPT | Performed by: FAMILY MEDICINE

## 2024-07-02 PROCEDURE — 80048 BASIC METABOLIC PNL TOTAL CA: CPT | Performed by: FAMILY MEDICINE

## 2024-07-02 PROCEDURE — 85025 COMPLETE CBC W/AUTO DIFF WBC: CPT | Performed by: FAMILY MEDICINE

## 2024-07-02 PROCEDURE — 73502 X-RAY EXAM HIP UNI 2-3 VIEWS: CPT

## 2024-07-02 RX ADMIN — ENOXAPARIN SODIUM 40 MG: 40 INJECTION SUBCUTANEOUS at 08:24

## 2024-07-02 RX ADMIN — ACETAMINOPHEN 975 MG: 325 TABLET, FILM COATED ORAL at 21:00

## 2024-07-02 RX ADMIN — FOLIC ACID 1 MG: 1 TABLET ORAL at 08:24

## 2024-07-02 RX ADMIN — MULTIPLE VITAMINS W/ MINERALS TAB 1 TABLET: TAB ORAL at 08:24

## 2024-07-02 RX ADMIN — Medication 500 MG: at 16:19

## 2024-07-02 RX ADMIN — OXYCODONE HYDROCHLORIDE 5 MG: 5 TABLET ORAL at 10:43

## 2024-07-02 RX ADMIN — THIAMINE HCL TAB 100 MG 100 MG: 100 TAB at 08:24

## 2024-07-02 RX ADMIN — ACETAMINOPHEN 975 MG: 325 TABLET, FILM COATED ORAL at 05:07

## 2024-07-02 RX ADMIN — ACETAMINOPHEN 975 MG: 325 TABLET, FILM COATED ORAL at 13:49

## 2024-07-02 RX ADMIN — Medication 500 MG: at 08:24

## 2024-07-02 NOTE — PROGRESS NOTES
Patient:    MRN:  [id number]    Hetal Request ID:  [id number]    Level of care reserved:  Skilled Nursing Facility    Partner Reserved:  [facility] [place] [id number] [phone]    Clinical needs requested:    Geography searched:  30 miles around [address]    Start of Service:    Request sent:  1:02pm EDT on 6/26/2024 by Kisha Carreno    Partner reserved:  8:03am EDT on 7/2/2024 by Kisha Carreno    Choice list shared:  4:09pm EDT on 7/1/2024 by Kisha Carreno

## 2024-07-02 NOTE — ASSESSMENT & PLAN NOTE
Malnutrition Findings:   Adult Malnutrition type: Chronic illness  Adult Degree of Malnutrition: Other severe protein calorie malnutrition  Malnutrition Characteristics: Inadequate energy, Weight loss                  360 Statement: Severe protein calorie malnutrition in context of chronic illness r/t inadequate PO intake, poor appetite as evidance by energy intake less than 75% compared to estimated needs>1month, 15% wt loss x 5 months ( 63.1kg 1/23/24-> 53.3kg 6/25); Treated with liberalized diet, pt declined oral supplements    BMI Findings:  Adult BMI Classifications: Underweight < 18.5        Body mass index is 20.41 kg/m².

## 2024-07-02 NOTE — PLAN OF CARE
Problem: Potential for Falls  Goal: Patient will remain free of falls  Description: INTERVENTIONS:  - Educate patient/family on patient safety including physical limitations  - Instruct patient to call for assistance with activity   - Consult OT/PT to assist with strengthening/mobility   - Keep Call bell within reach  - Keep bed low and locked with side rails adjusted as appropriate  - Keep care items and personal belongings within reach  - Initiate and maintain comfort rounds  - Make Fall Risk Sign visible to staff  - Offer Toileting every  Hours, in advance of need  - Initiate/Maintain alarm  - Obtain necessary fall risk management equipment:   - Apply yellow socks and bracelet for high fall risk patients  - Consider moving patient to room near nurses station  Outcome: Progressing     Problem: Prexisting or High Potential for Compromised Skin Integrity  Goal: Skin integrity is maintained or improved  Description: INTERVENTIONS:  - Identify patients at risk for skin breakdown  - Assess and monitor skin integrity  - Assess and monitor nutrition and hydration status  - Monitor labs   - Assess for incontinence   - Turn and reposition patient  - Assist with mobility/ambulation  - Relieve pressure over bony prominences  - Avoid friction and shearing  - Provide appropriate hygiene as needed including keeping skin clean and dry  - Evaluate need for skin moisturizer/barrier cream  - Collaborate with interdisciplinary team   - Patient/family teaching  - Consider wound care consult   Outcome: Progressing     Problem: COPING  Goal: Pt/Family able to verbalize concerns and demonstrate effective coping strategies  Description: INTERVENTIONS:  - Assist patient/family to identify coping skills, available support systems and cultural and spiritual values  - Provide emotional support, including active listening and acknowledgement of concerns of patient and caregivers  - Reduce environmental stimuli, as able  - Provide patient  education  - Assess for spiritual pain/suffering and initiate spiritual care, including notification of Pastoral Care or audra based community as needed  - Assess effectiveness of coping strategies  Outcome: Progressing  Goal: Will report anxiety at manageable levels  Description: INTERVENTIONS:  - Administer medication as ordered  - Teach and encourage coping skills  - Provide emotional support  - Assess patient/family for anxiety and ability to cope  Outcome: Progressing     Problem: SUBSTANCE USE/ABUSE  Goal: Will have no detox symptoms and will verbalize plan for changing substance-related behavior  Description: INTERVENTIONS:  - Monitor physical status and assess for symptoms of withdrawal  - Administer medication as ordered  - Provide emotional support with 1 on 1 interaction with staff  - Encourage recovery focused program/ addiction education  - Assess for verbalization of changing behaviors related to substance abuse  - Initiate consults and referrals as appropriate (Case Management, Spiritual Care, etc.)  Outcome: Progressing  Goal: By discharge, will develop insight into their chemical dependency and sustain motivation to continue in recovery  Description: INTERVENTIONS:  - Attends all daily group sessions and scheduled AA groups  - Actively practices coping skills through participation in the therapeutic community and adherence to program rules  - Reviews and completes assignments from individual treatment plan  - Assist patient development of understanding of their personal cycle of addiction and relapse triggers  Outcome: Progressing  Goal: By discharge, patient will have ongoing treatment plan addressing chemical dependency  Description: INTERVENTIONS:  - Assist patient with resources and/or appointments for ongoing recovery based living  Outcome: Progressing     Problem: PAIN - ADULT  Goal: Verbalizes/displays adequate comfort level or baseline comfort level  Description: Interventions:  - Encourage  patient to monitor pain and request assistance  - Assess pain using appropriate pain scale  - Administer analgesics based on type and severity of pain and evaluate response  - Implement non-pharmacological measures as appropriate and evaluate response  - Consider cultural and social influences on pain and pain management  - Notify physician/advanced practitioner if interventions unsuccessful or patient reports new pain  Outcome: Progressing     Problem: Nutrition/Hydration-ADULT  Goal: Nutrient/Hydration intake appropriate for improving, restoring or maintaining nutritional needs  Description: Monitor and assess patient's nutrition/hydration status for malnutrition. Collaborate with interdisciplinary team and initiate plan and interventions as ordered.  Monitor patient's weight and dietary intake as ordered or per policy. Utilize nutrition screening tool and intervene as necessary. Determine patient's food preferences and provide high-protein, high-caloric foods as appropriate.     INTERVENTIONS:  - Monitor oral intake, urinary output, labs, and treatment plans  - Assess nutrition and hydration status and recommend course of action  - Evaluate amount of meals eaten  - Assist patient with eating if necessary   - Allow adequate time for meals  - Recommend/ encourage appropriate diets, oral nutritional supplements, and vitamin/mineral supplements  - Order, calculate, and assess calorie counts as needed  - Recommend, monitor, and adjust tube feedings and TPN/PPN based on assessed needs  - Assess need for intravenous fluids  - Provide specific nutrition/hydration education as appropriate  - Include patient/family/caregiver in decisions related to nutrition  Outcome: Progressing     Problem: SKIN/TISSUE INTEGRITY - ADULT  Goal: Incision(s), wounds(s) or drain site(s) healing without S/S of infection  Description: INTERVENTIONS  - Assess and document dressing, incision, wound bed, drain sites and surrounding tissue  -  Provide patient and family education  - Perform skin care/dressing changes every   Outcome: Progressing     Problem: HEMATOLOGIC - ADULT  Goal: Maintains hematologic stability  Description: INTERVENTIONS  - Assess for signs and symptoms of bleeding or hemorrhage  - Monitor labs  - Administer supportive blood products/factors as ordered and appropriate  Outcome: Progressing
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
## 2024-07-02 NOTE — ASSESSMENT & PLAN NOTE
Followed by orthopedic surgery  Repeat left hip XR ordered by orthopedic surgery due to patient's complaints of worsening pain

## 2024-07-03 VITALS
HEART RATE: 77 BPM | TEMPERATURE: 98.1 F | SYSTOLIC BLOOD PRESSURE: 112 MMHG | HEIGHT: 67 IN | OXYGEN SATURATION: 100 % | DIASTOLIC BLOOD PRESSURE: 64 MMHG | WEIGHT: 130.29 LBS | BODY MASS INDEX: 20.45 KG/M2 | RESPIRATION RATE: 20 BRPM

## 2024-07-03 PROCEDURE — 99239 HOSP IP/OBS DSCHRG MGMT >30: CPT | Performed by: FAMILY MEDICINE

## 2024-07-03 RX ADMIN — FOLIC ACID 1 MG: 1 TABLET ORAL at 09:23

## 2024-07-03 RX ADMIN — Medication 500 MG: at 06:44

## 2024-07-03 RX ADMIN — ACETAMINOPHEN 975 MG: 325 TABLET, FILM COATED ORAL at 06:44

## 2024-07-03 RX ADMIN — THIAMINE HCL TAB 100 MG 100 MG: 100 TAB at 09:22

## 2024-07-03 RX ADMIN — MULTIPLE VITAMINS W/ MINERALS TAB 1 TABLET: TAB ORAL at 09:22

## 2024-07-03 RX ADMIN — ENOXAPARIN SODIUM 40 MG: 40 INJECTION SUBCUTANEOUS at 09:22

## 2024-07-03 NOTE — RESTORATIVE TECHNICIAN NOTE
Restorative Technician Note      Patient Name: Alyson Madera     [facility] Tech Visit Date: 07/03/24  Note Type: Mobility  Patient Position Upon Consult: Supine  Activity Performed: Ambulated; Range of motion  Assistive Device: Roller walker  Patient Position at End of Consult: Supine; All needs within reach
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
## 2024-07-03 NOTE — PLAN OF CARE
Problem: Potential for Falls  Goal: Patient will remain free of falls  Description: INTERVENTIONS:  - Educate patient/family on patient safety including physical limitations  - Instruct patient to call for assistance with activity   - Consult OT/PT to assist with strengthening/mobility   - Keep Call bell within reach  - Keep bed low and locked with side rails adjusted as appropriate  - Keep care items and personal belongings within reach  - Initiate and maintain comfort rounds  - Make Fall Risk Sign visible to staff  - Offer Toileting every 2 Hours, in advance of need  - Initiate/Maintain bed alarm  - Obtain necessary fall risk management equipment:   - Apply yellow socks and bracelet for high fall risk patients  - Consider moving patient to room near nurses station  Outcome: Progressing     Problem: Prexisting or High Potential for Compromised Skin Integrity  Goal: Skin integrity is maintained or improved  Description: INTERVENTIONS:  - Identify patients at risk for skin breakdown  - Assess and monitor skin integrity  - Assess and monitor nutrition and hydration status  - Monitor labs   - Assess for incontinence   - Turn and reposition patient  - Assist with mobility/ambulation  - Relieve pressure over bony prominences  - Avoid friction and shearing  - Provide appropriate hygiene as needed including keeping skin clean and dry  - Evaluate need for skin moisturizer/barrier cream  - Collaborate with interdisciplinary team   - Patient/family teaching  - Consider wound care consult   Outcome: Progressing     Problem: COPING  Goal: Pt/Family able to verbalize concerns and demonstrate effective coping strategies  Description: INTERVENTIONS:  - Assist patient/family to identify coping skills, available support systems and cultural and spiritual values  - Provide emotional support, including active listening and acknowledgement of concerns of patient and caregivers  - Reduce environmental stimuli, as able  - Provide  patient education  - Assess for spiritual pain/suffering and initiate spiritual care, including notification of Pastoral Care or audra based community as needed  - Assess effectiveness of coping strategies  Outcome: Progressing  Goal: Will report anxiety at manageable levels  Description: INTERVENTIONS:  - Administer medication as ordered  - Teach and encourage coping skills  - Provide emotional support  - Assess patient/family for anxiety and ability to cope  Outcome: Progressing     Problem: SUBSTANCE USE/ABUSE  Goal: Will have no detox symptoms and will verbalize plan for changing substance-related behavior  Description: INTERVENTIONS:  - Monitor physical status and assess for symptoms of withdrawal  - Administer medication as ordered  - Provide emotional support with 1 on 1 interaction with staff  - Encourage recovery focused program/ addiction education  - Assess for verbalization of changing behaviors related to substance abuse  - Initiate consults and referrals as appropriate (Case Management, Spiritual Care, etc.)  Outcome: Progressing  Goal: By discharge, will develop insight into their chemical dependency and sustain motivation to continue in recovery  Description: INTERVENTIONS:  - Attends all daily group sessions and scheduled AA groups  - Actively practices coping skills through participation in the therapeutic community and adherence to program rules  - Reviews and completes assignments from individual treatment plan  - Assist patient development of understanding of their personal cycle of addiction and relapse triggers  Outcome: Progressing  Goal: By discharge, patient will have ongoing treatment plan addressing chemical dependency  Description: INTERVENTIONS:  - Assist patient with resources and/or appointments for ongoing recovery based living  Outcome: Progressing     Problem: PAIN - ADULT  Goal: Verbalizes/displays adequate comfort level or baseline comfort level  Description: Interventions:  -  Encourage patient to monitor pain and request assistance  - Assess pain using appropriate pain scale  - Administer analgesics based on type and severity of pain and evaluate response  - Implement non-pharmacological measures as appropriate and evaluate response  - Consider cultural and social influences on pain and pain management  - Notify physician/advanced practitioner if interventions unsuccessful or patient reports new pain  Outcome: Progressing     Problem: Nutrition/Hydration-ADULT  Goal: Nutrient/Hydration intake appropriate for improving, restoring or maintaining nutritional needs  Description: Monitor and assess patient's nutrition/hydration status for malnutrition. Collaborate with interdisciplinary team and initiate plan and interventions as ordered.  Monitor patient's weight and dietary intake as ordered or per policy. Utilize nutrition screening tool and intervene as necessary. Determine patient's food preferences and provide high-protein, high-caloric foods as appropriate.     INTERVENTIONS:  - Monitor oral intake, urinary output, labs, and treatment plans  - Assess nutrition and hydration status and recommend course of action  - Evaluate amount of meals eaten  - Assist patient with eating if necessary   - Allow adequate time for meals  - Recommend/ encourage appropriate diets, oral nutritional supplements, and vitamin/mineral supplements  - Order, calculate, and assess calorie counts as needed  - Recommend, monitor, and adjust tube feedings and TPN/PPN based on assessed needs  - Assess need for intravenous fluids  - Provide specific nutrition/hydration education as appropriate  - Include patient/family/caregiver in decisions related to nutrition  Outcome: Progressing     Problem: SKIN/TISSUE INTEGRITY - ADULT  Goal: Incision(s), wounds(s) or drain site(s) healing without S/S of infection  Description: INTERVENTIONS  - Assess and document dressing, incision, wound bed, drain sites and surrounding  tissue  - Provide patient and family education  Outcome: Progressing     Problem: HEMATOLOGIC - ADULT  Goal: Maintains hematologic stability  Description: INTERVENTIONS  - Assess for signs and symptoms of bleeding or hemorrhage  - Monitor labs  - Administer supportive blood products/factors as ordered and appropriate  Outcome: Progressing

## 2024-07-03 NOTE — CASE MANAGEMENT
Case Management Discharge Planning Note    Patient name Alyson Madera  [place] [address] /E2 -* MRN [id number]  : 1965 Date 7/3/2024       Current Admission Date: 2024  Current Admission Diagnosis:Closed nondisplaced intertrochanteric fracture of left femur (HCC)   Patient Active Problem List    Diagnosis Date Noted Date Diagnosed    Severe protein-calorie malnutrition (HCC) 2024     Closed nondisplaced intertrochanteric fracture of left femur (HCC) 2024     Avascular necrosis of femur head, left (HCC) 2024     Avascular necrosis of bone of hip (HCC) 2024     Secondary esophageal varices with bleeding (HCC) 2024     Chronic obstructive pulmonary disease, unspecified COPD type (HCC) 2024     Alcohol use disorder, severe, dependence (HCC) 2024     Thrombocytopenia (HCC) 2024     Hypothyroidism 2023     Constipation 2023     Hyperlipidemia 2023     Hematemesis 2023     Acute blood loss anemia 2023     Hyponatremia 10/22/2019     Alcoholic cirrhosis of liver without ascites (HCC) 10/22/2019     Hypokalemia 10/21/2019     GI bleed 10/20/2019     Symptomatic anemia 10/20/2019     Schizoaffective disorder, bipolar type (HCC) 05/10/2016     Alcohol abuse 05/10/2016     Suicidal ideations 05/10/2016     Homicidal ideation 05/10/2016     Tobacco abuse 05/10/2016       LOS (days): 9  Geometric Mean LOS (GMLOS) (days): 6.5  Days to GMLOS:-1.9     OBJECTIVE:  Risk of Unplanned Readmission Score: 18.88         Current admission status: Inpatient   Preferred Pharmacy:   [id number]  [place] - [address]  [address] [facility] [place]   Phone: [phone] Fax: [phone]    [id number] - [place] - [address]  [address] [address]  Phone: [phone] Fax: [phone]    [facility] - [place] - [address],  [facility]   [address],  First [address] [place] [address]  Phone: [phone] Fax: [phone]    [id number] - [place] [place] - [address]  [address] [address]  Phone: [phone] Fax: [phone]    Primary Care Provider: Miguel Duffy DO    Primary Insurance: MEDICARE  Secondary Insurance:     DISCHARGE DETAILS:    Discharge planning discussed with:: Patient  Freedom of Choice: Yes  Comments - Freedom of Choice: Agreeable to dc to [facility]  CM contacted family/caregiver?: No- see comments (declined)  Were Treatment Team discharge recommendations reviewed with patient/caregiver?: Yes  Did patient/caregiver verbalize understanding of patient care needs?: N/A- going to facility            Requested Home Health Care         Is the patient interested in HHC at discharge?: No    DME Referral Provided  Referral made for DME?: No    Other Referral/Resources/Interventions Provided:  Interventions: Other (Specify)  Referral Comments: Resources for housing and food insecurity provided to pt in the [facility] per request         Treatment Team Recommendation: Short Term Rehab  Discharge Destination Plan:: Short Term Rehab  Transport at Discharge : Wheelchair van        Transported by (Company and Unit #): [employer] ([phone])  ETA of Transport (Date): 07/03/24  ETA of Transport (Time): 1230  Discussed with Patient  there may be an out of pocket expense for transport.       Transfer Mode: Wheelchair  Accompanied by: EMS personnel     IMM Given (Date):: 07/03/24  IMM Given to:: Patient  IMM was reviewed with the pt. Pt reports understanding of the ability to appeal discharge if feeling as though not medically stable for discharge. IMM was signed and placed in the scan bin.            Accepting Facility Name, City & State : [facility]  Receiving Facility/Agency Phone Number: [phone]  Facility/Agency Fax Number: [phone]
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
## 2024-07-04 NOTE — ASSESSMENT & PLAN NOTE
Cleared by psychiatry for rehab placement, no indication for initiation of medications per psychiatry - was deemed in remission  Not on outpatient medications for 6 month
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
## 2024-07-05 ENCOUNTER — PATIENT OUTREACH (OUTPATIENT)
Dept: FAMILY MEDICINE CLINIC | Facility: CLINIC | Age: 59
End: 2024-07-05

## 2024-07-05 DIAGNOSIS — Z59.82 TRANSPORTATION INSECURITY: ICD-10-CM

## 2024-07-05 DIAGNOSIS — Z59.819 HOUSING INSECURITY: ICD-10-CM

## 2024-07-05 DIAGNOSIS — Z59.41 FOOD INSECURITY: Primary | ICD-10-CM

## 2024-07-05 SDOH — ECONOMIC STABILITY - HOUSING INSECURITY: HOUSING INSTABILITY UNSPECIFIED: Z59.819

## 2024-07-05 SDOH — ECONOMIC STABILITY - FOOD INSECURITY: FOOD INSECURITY: Z59.41

## 2024-07-05 SDOH — ECONOMIC STABILITY - TRANSPORTATION SECURITY: TRANSPORTATION INSECURITY: Z59.82
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
## 2024-07-08 ENCOUNTER — PATIENT OUTREACH (OUTPATIENT)
Dept: FAMILY MEDICINE CLINIC | Facility: CLINIC | Age: 59
End: 2024-07-08

## 2024-07-08 NOTE — PROGRESS NOTES
SAHARA SAUCEDO received a referral from patient's PCP regarding patient's need for social work follow up. SAHARA SAUCEDO team has made multiple attempts to contact patient to assist, however, attempts to reach patient have been unsuccessful at this time. Referral will be closed, however, SAHARA SAUCEDO will be available if needed.

## 2024-07-10 ENCOUNTER — TRANSITIONAL CARE MANAGEMENT (OUTPATIENT)
Dept: FAMILY MEDICINE CLINIC | Facility: CLINIC | Age: 59
End: 2024-07-10

## 2024-07-12 ENCOUNTER — OFFICE VISIT (OUTPATIENT)
Dept: FAMILY MEDICINE CLINIC | Facility: CLINIC | Age: 59
End: 2024-07-12
Payer: MEDICARE

## 2024-07-12 VITALS
OXYGEN SATURATION: 97 % | HEIGHT: 67 IN | WEIGHT: 130 LBS | HEART RATE: 105 BPM | DIASTOLIC BLOOD PRESSURE: 80 MMHG | SYSTOLIC BLOOD PRESSURE: 140 MMHG | BODY MASS INDEX: 20.4 KG/M2 | TEMPERATURE: 97.5 F

## 2024-07-12 DIAGNOSIS — S72.145A CLOSED NONDISPLACED INTERTROCHANTERIC FRACTURE OF LEFT FEMUR, INITIAL ENCOUNTER (HCC): ICD-10-CM

## 2024-07-12 DIAGNOSIS — Z78.0 POST-MENOPAUSE: ICD-10-CM

## 2024-07-12 DIAGNOSIS — K70.30 ALCOHOLIC CIRRHOSIS OF LIVER WITHOUT ASCITES (HCC): ICD-10-CM

## 2024-07-12 DIAGNOSIS — M87.059 AVASCULAR NECROSIS OF BONE OF HIP, UNSPECIFIED LATERALITY (HCC): Primary | ICD-10-CM

## 2024-07-12 PROCEDURE — 99495 TRANSJ CARE MGMT MOD F2F 14D: CPT | Performed by: FAMILY MEDICINE

## 2024-07-12 NOTE — ASSESSMENT & PLAN NOTE
Unchanged  Continues to drink 6-12 beers daily  Does not appear jaundiced on exam today  Visibly intoxicated with slurred speech  She is aao x4  Did not drive to office and obtained a ride from a friend  Currently homeless and discussed case with , [facility]. Provided patient with the #211 to assist with living arrangement. Patient declines assistance with calling.   Social work referral placed  Encouraged complete alcohol cessation and admission for acute alcohol detox but patient declines

## 2024-07-12 NOTE — PROGRESS NOTES
TCM Call     Date and time call was made  7/10/2024  9:28 AM    Patient was hospitialized at  [facility]    Date of Admission  06/24/24    Date of discharge  07/03/24    Disposition  Home    Were the patients medications reviewed and updated  Yes    Current Symptoms  None      TCM Call     Post hospital issues  None    Should patient be enrolled in anticoag monitoring?  No    Scheduled for follow up?  Patient refused    Did you obtain your prescribed medications  Yes    Do you need help managing your prescriptions or medications  No    Is transportation to your appointment needed  No    I have advised the patient to call PCP with any new or worsening symptoms  [facility]    Living Arrangements  Family members    Support System  Family    The type of support provided  Emotional; Physical; Financial    Do you have social support  Yes, as much as I need    Are you recieving any outpatient services  No    Are you recieving home care services  No    Are you using any community resources  No    Current waiver services  No    Have you fallen in the last 12 months  No    Interperter language line needed  No    Counseling  Family               Assessment/Plan:      1. Avascular necrosis of bone of hip, unspecified laterality ([facility])  -     DXA bone density spine hip and pelvis; Future; Expected date: 07/12/2024  -     Suture removal  2. Closed nondisplaced intertrochanteric fracture of left femur, initial encounter ([facility])  Assessment & Plan:  S/p OR Fixation on 6.25.2024  Left ama prior to discharge to [facility]  Patient presents for staple removal  Visibly intoxicated but aao x4  Patient signed informed consent. Understands that wound could dehisce and become infected if she falls again despite continued alcohol usage and intoxication  Krystal fenton was chaperone during stable remove  Patient tolerated removal of 21 staples to left lateral hip and thigh without immediate complication  Antibiotic ointment and  dressing placed  Discussed basic wound care with patient  Orders:  -     DXA bone density spine hip and pelvis; Future; Expected date: 07/12/2024  -     Suture removal  3. Post-menopause  -     DXA bone density spine hip and pelvis; Future; Expected date: 07/12/2024  4. Alcoholic cirrhosis of liver without ascites (HCC)  Assessment & Plan:  Unchanged  Continues to drink 6-12 beers daily  Does not appear jaundiced on exam today  Visibly intoxicated with slurred speech  She is aao x4  Did not drive to office and obtained a ride from a friend  Currently homeless and discussed case with , [place] [place]. Provided patient with the #211 to assist with living arrangement. Patient declines assistance with calling.   Social work referral placed  Encouraged complete alcohol cessation and admission for acute alcohol detox but patient declines          Subjective:      Patient ID: Alyson Madera is a 59 y.o. female presents today for hospital follow-up.  Patient was admitted on 6/24/2024 after fall suffering a non-displaced left intertrochanteric fracture and avascular necrosis of left femoral head.   S/p ORIF 6/25/2024  Patient left ama on 7/3/2024   She is visibly intoxicated today.   HPI    The following portions of the patient's history were reviewed and updated as appropriate: allergies, current medications, past family history, past medical history, past social history, past surgical history, and problem list.    Review of Systems   Constitutional:  Negative for activity change, chills, diaphoresis and fever.   HENT:  Negative for ear pain, hearing loss, postnasal drip, rhinorrhea, sinus pressure, sinus pain, sneezing and sore throat.    Respiratory:  Negative for cough, chest tightness, shortness of breath and wheezing.    Cardiovascular:  Negative for chest pain, palpitations and leg swelling.   Gastrointestinal:  Negative for abdominal pain, blood in stool, constipation, diarrhea, nausea and vomiting.  "  Genitourinary:  Negative for dysuria, frequency, hematuria and urgency.   Musculoskeletal:  Negative for arthralgias and myalgias.   Skin:  Positive for wound (left hip incision).   Neurological:  Negative for dizziness, syncope, weakness, light-headedness, numbness and headaches.         Objective:      /80 (BP Location: Left arm, Patient Position: Sitting, Cuff Size: Standard)   Pulse 105   Temp 97.5 °F (36.4 °C) (Temporal)   Ht 5' 7\" (1.702 m)   Wt 59 kg (130 lb)   SpO2 97%   BMI 20.36 kg/m²          Physical Exam  Vitals reviewed.   Constitutional:       General: She is not in acute distress.     Appearance: She is well-developed. She is not diaphoretic.   HENT:      Head: Normocephalic and atraumatic.      Right Ear: External ear normal.      Left Ear: External ear normal.      Nose: Nose normal. No congestion.      Mouth/Throat:      Mouth: Mucous membranes are moist.      Pharynx: Oropharynx is clear. No oropharyngeal exudate.   Eyes:      General: No scleral icterus.        Right eye: No discharge.         Left eye: No discharge.      Conjunctiva/sclera: Conjunctivae normal.      Pupils: Pupils are equal, round, and reactive to light.   Neck:      Thyroid: No thyromegaly.      Vascular: No JVD.      Trachea: No tracheal deviation.   Cardiovascular:      Rate and Rhythm: Normal rate and regular rhythm.      Heart sounds: Normal heart sounds. No murmur heard.     No friction rub. No gallop.   Pulmonary:      Effort: Pulmonary effort is normal. No respiratory distress.      Breath sounds: Normal breath sounds. No wheezing or rales.   Chest:      Chest wall: No tenderness.   Abdominal:      General: Bowel sounds are normal. There is no distension.      Palpations: Abdomen is soft.      Tenderness: There is no abdominal tenderness. There is no right CVA tenderness, left CVA tenderness, guarding or rebound.   Musculoskeletal:         General: Normal range of motion.      Cervical back: Normal range " of motion and neck supple. No tenderness.      Left hip: No bony tenderness.      Right lower leg: No edema.      Left lower leg: No edema.        Legs:    Lymphadenopathy:      Cervical: No cervical adenopathy.   Skin:     General: Skin is warm and dry.   Neurological:      Mental Status: She is alert and oriented to person, place, and time. Mental status is at baseline.   Psychiatric:         Mood and Affect: Mood normal.         Speech: Speech is slurred.         Behavior: Behavior is hyperactive. Behavior is cooperative.         Thought Content: Thought content normal.         Judgment: Judgment normal.           Suture removal    Date/Time: 7/12/2024 4:00 PM    Performed by: Miguel Duffy DO  Authorized by: Miguel Duffy DO  Universal Protocol:  Consent: Verbal consent obtained. Written consent obtained.  Risks and benefits: risks, benefits and alternatives were discussed  Consent given by: patient  Patient identity confirmed: verbally with patient      Patient location:  Clinic  Location:     Laterality:  Left    Location:  Lower extremity    Lower extremity location:  Hip    Hip location:  L hip  Procedure details:     Tools used:  Suture removal kit    Wound appearance:  No sign(s) of infection, good wound healing and clean    Number of staples removed:  21  Post-procedure details:     Post-removal:  Antibiotic ointment applied and dressing applied    Patient tolerance of procedure:  Tolerated well, no immediate complications

## 2024-07-12 NOTE — ASSESSMENT & PLAN NOTE
S/p OR Fixation on 6.25.2024  Left ama prior to discharge to [facility]  Patient presents for staple removal  Visibly intoxicated but aao x4  Patient signed informed consent. Understands that wound could dehisce and become infected if she falls again despite continued alcohol usage and intoxication  Krystal fenton was chaperone during stable remove  Patient tolerated removal of 21 staples to left lateral hip and thigh without immediate complication  Antibiotic ointment and dressing placed  Discussed basic wound care with patient

## 2024-07-19 ENCOUNTER — TELEPHONE (OUTPATIENT)
Age: 59
End: 2024-07-19

## 2024-07-19 ENCOUNTER — PATIENT OUTREACH (OUTPATIENT)
Dept: CASE MANAGEMENT | Facility: OTHER | Age: 59
End: 2024-07-19

## 2024-07-19 ENCOUNTER — TELEPHONE (OUTPATIENT)
Dept: FAMILY MEDICINE CLINIC | Facility: CLINIC | Age: 59
End: 2024-07-19
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
## 2024-07-25 ENCOUNTER — TELEPHONE (OUTPATIENT)
Age: 59
End: 2024-07-25

## 2024-07-25 NOTE — TELEPHONE ENCOUNTER
Patient called to schedule appointment for ER F/U. Attempted to schedule for today at 1:40pm but she stated that she's in a hotel and wasn't sure if her sister could bring her. I then offered 8/2/24 but she stated she has to check with her sister. She will call back to schedule.

## 2024-07-25 NOTE — TELEPHONE ENCOUNTER
Pt took the bandaid off her forehead and doesn't need a f/u with Dr. CODY Cut looks clean and healing

## 2024-10-05 ENCOUNTER — NURSE TRIAGE (OUTPATIENT)
Dept: OTHER | Facility: OTHER | Age: 59
End: 2024-10-05

## 2024-10-05 NOTE — TELEPHONE ENCOUNTER
Patient calling to request an appointment to assess her right great toe nail that has a fungus on it. Also to request information about getting a Haldol injection that she is due for on 10/28 per patient. Please call patient to follow up. Thank you.

## 2024-10-07 ENCOUNTER — TELEPHONE (OUTPATIENT)
Age: 59
End: 2024-10-07

## 2024-10-07 NOTE — TELEPHONE ENCOUNTER
Pt would want someone to call her back urgently please do call her back on the below request. Thanks

## 2024-10-07 NOTE — TELEPHONE ENCOUNTER
Pt called, is kindly requesting a return call. Pt was treated in the ED 9/19, rejected an injection of HALDOL.  Pt states she is due for her next injection 10/28. Pt states she refuses to have the injection done in the hospital.  Pt would like to know if her PCP can give this injection?  Please advise.

## 2024-10-07 NOTE — TELEPHONE ENCOUNTER
LVM to pt per pcp message     I do not give these injections. She would need to see a psychiatrist for this

## 2024-10-09 ENCOUNTER — TELEPHONE (OUTPATIENT)
Age: 59
End: 2024-10-09

## 2024-10-09 NOTE — TELEPHONE ENCOUNTER
Patient called in regards to wanting to know how did her surgery back in June for her left hip. Patient was informed that the surgeon was Gabe Lawrence.

## 2024-10-20 ENCOUNTER — RA CDI HCC (OUTPATIENT)
Dept: OTHER | Facility: HOSPITAL | Age: 59
End: 2024-10-20

## 2024-10-25 ENCOUNTER — TELEPHONE (OUTPATIENT)
Age: 59
End: 2024-10-25

## 2024-10-25 NOTE — TELEPHONE ENCOUNTER
Scarlet mcgovern PA student working with the psychiatrist at [facility] where patient was currently admitted contacted the office this afternoon. Patient came to them on 10/23. Wanted to confirm if patient had upcoming appt on 10/28 in the office to be given Haldol injection. I did relay that per encounter note from 10/07 pcp does not give these injections, would need to see a psychiatrist for this but she does have a scheduled appt in the office. Also wanted to know when last injection was. I was unable to provide that information.

## 2024-12-30 ENCOUNTER — TELEPHONE (OUTPATIENT)
Age: 59
End: 2024-12-30

## 2024-12-30 NOTE — TELEPHONE ENCOUNTER
Pt sister, Danelle called (confirmed is on SL consent). Every 30 days, she takes her sister to the [facility] to get an injection of HALDOL.  The pharmacy is out of the way from where they live, is looking for somewhere closer who will give this injection.    The pt sister would like to know instead of going to the pharmacy, can she bring her sister, Alyson, to the PCP office to get the HALDOL injection?    If yes, is this something the office has in stock, or does she need to  the medication from the pharmacy, bring with her for the injection?  If kindly requesting a return call to discuss further. Danelle's contact info:  [phone]

## 2024-12-31 NOTE — TELEPHONE ENCOUNTER
Patient asking if she can schedule her Haldol injection in office with our nurses. [facility] is out of the way for her and her sister who is her transportation. Please advise as her injection was due on 12/30.  Patient used to have done in office at psychiatry (Dr. Dallas) but office is no longer open.  Patient last visit in office July 2024. Please advise and call patient.

## 2025-01-03 ENCOUNTER — TELEPHONE (OUTPATIENT)
Age: 60
End: 2025-01-03

## 2025-01-03 NOTE — TELEPHONE ENCOUNTER
Pt called in requesting Dr. Rivera if he can accommodate her in at the practice moving forward from the month February 2025 to get her Bipolar disorder - Haloperidol Tecanoate 100 ML shot at the practice. As she finds the pharmacy which she regularly visits is far off for her and she got to depend on her sister all the time as the patient is [employer]. She is getting her January shot today at the pharmacy this noon as her sister would be driving her over. Kindly check if Dr. Rivera or his assistant would help her to regularly get the shot at the practice she is willing to bring in her prescription over. Kindly check and call her back to update on the same by an return call. Thanks

## 2025-01-03 NOTE — TELEPHONE ENCOUNTER
I do not manage Haldol injections. She would need to speak with her psychiatrist about receiving these injections.

## 2025-01-28 ENCOUNTER — TELEPHONE (OUTPATIENT)
Age: 60
End: 2025-01-28

## 2025-01-28 NOTE — TELEPHONE ENCOUNTER
Contacted patient in regards to Routine Referral in attempts to verify patient's needs of services and add patient to proper wait list. LVM for patient to contact intake dept  in regards to Routine referral at [phone]. 1st attempt.

## 2025-01-29 NOTE — TELEPHONE ENCOUNTER
Patient returned call regarding Routine Referral. Writer verified Full Name, , Callback Number, Address, and Insurance. Writer spoke with patient who confirmed needs of service for med mgmt. Writer added patient to wait list.    closed, Referral Completed    Preferences: Gloria Godinez/[facility]    Additional Resource Guide Request via mail

## 2025-01-30 ENCOUNTER — PATIENT OUTREACH (OUTPATIENT)
Dept: CASE MANAGEMENT | Facility: OTHER | Age: 60
End: 2025-01-30

## 2025-01-30 ENCOUNTER — TELEPHONE (OUTPATIENT)
Age: 60
End: 2025-01-30

## 2025-01-30 DIAGNOSIS — F25.0 SCHIZOAFFECTIVE DISORDER, BIPOLAR TYPE (HCC): ICD-10-CM

## 2025-01-30 DIAGNOSIS — R45.850 HOMICIDAL IDEATION: ICD-10-CM

## 2025-01-30 DIAGNOSIS — R45.851 SUICIDAL IDEATIONS: ICD-10-CM

## 2025-01-30 DIAGNOSIS — F10.920 ALCOHOLIC INTOXICATION WITHOUT COMPLICATION (HCC): Primary | ICD-10-CM

## 2025-01-30 DIAGNOSIS — G93.40 ENCEPHALOPATHY, UNSPECIFIED TYPE: ICD-10-CM

## 2025-01-30 DIAGNOSIS — F10.929 ALCOHOLIC INTOXICATION WITH COMPLICATION (HCC): ICD-10-CM

## 2025-01-30 NOTE — PROGRESS NOTES
"SAHARA SAUCEDO received a referral from patient's PCP regarding patient's need for assistance with establishing psychiatry. Per chart review, pt has been contacted by [facility] and placed on wait list. Pt was also provided with additional resource guide.  LVM requesting a call in return at patient's earliest convenience.Will continue attempts to reach patient.     --      SAHARA SAUCEDO received seven back to back missed calls, within the span of 30 min, while on the phone assisting other patients. Patient left voicemail to SAHARA SAUCEDO calling SAHARA SAUCEDO derogatory term and stating if SAHARA SAUCEDO does not call back she \"will be a happier camper\" and that she is \"now unavailable.\" SAHARA SAUCEDO will message PCP to notify that there are no other psychiatry options outside of [facility] that can accept pt's dual Medicare/Medicaid plan at this time. Referral closed.   "

## 2025-02-19 NOTE — TELEPHONE ENCOUNTER
Pt contacted [facility] to inquire about scheduling an appt for MM services in order to continue receiving Haldol injections. Upon review of chart, writer found a STAT referral was received 2/5/25. Due to primary diagnosis, referral was sent to MAT for review. Please follow up and advise if pt is eligible for services through MAT. Insurance is [employer]  Replacement.

## 2025-02-19 NOTE — TELEPHONE ENCOUNTER
Received call from patient inquiring about services for Haldol injections. Patient stated that she had spoken to another Writer regarding her inquiry. Writer reviewed chart and indicated to patient that a message was sent to [facility] and that someone will be in contact with her.    Patient had STAT referral on 2.05.25 with primary concerns of alcoholic intoxication without complications. Referral was sent to [facility] on 2.06.25 for review and possible scheduling. Upon further review, Writer noticed that [facility] had not made attempts to contact patient.    Patient stated that she has 2 months left of Haldol injections at [place] [facility], which was filled by Grisel Hudson. Patient needs to be setup with psychiatric services to continue Haldol injections.    Writer will send high priority encounter to [facility] to follow up with patient at best ph# [phone].

## 2025-02-28 ENCOUNTER — TELEPHONE (OUTPATIENT)
Dept: PSYCHIATRY | Facility: CLINIC | Age: 60
End: 2025-02-28

## 2025-03-20 ENCOUNTER — TELEPHONE (OUTPATIENT)
Dept: FAMILY MEDICINE CLINIC | Facility: CLINIC | Age: 60
End: 2025-03-20

## 2025-03-20 NOTE — TELEPHONE ENCOUNTER
Patient removed from our panel after call was made. To confirm her dislike for our office. States she will find a new Provider.

## 2025-03-20 NOTE — TELEPHONE ENCOUNTER
Patient returned call to schedule appointment with Dr. Armijo. I advised patient Dr. Armijo retired last year. She states she was looking for her GI specialist (who is out of network) and then proceeded to state her dislike for Dr. Rivera when she understood where she called, then promptly disconnected the call.

## 2025-04-23 ENCOUNTER — OFFICE VISIT (OUTPATIENT)
Age: 60
End: 2025-04-23
Payer: COMMERCIAL

## 2025-04-23 VITALS
TEMPERATURE: 98.2 F | HEIGHT: 66 IN | HEART RATE: 103 BPM | DIASTOLIC BLOOD PRESSURE: 80 MMHG | WEIGHT: 154 LBS | OXYGEN SATURATION: 94 % | SYSTOLIC BLOOD PRESSURE: 120 MMHG | BODY MASS INDEX: 24.75 KG/M2

## 2025-04-23 DIAGNOSIS — K70.30 ALCOHOLIC CIRRHOSIS OF LIVER WITHOUT ASCITES (HCC): ICD-10-CM

## 2025-04-23 DIAGNOSIS — F10.20 ALCOHOL DEPENDENCE, UNCOMPLICATED (HCC): ICD-10-CM

## 2025-04-23 DIAGNOSIS — E03.9 HYPOTHYROIDISM, UNSPECIFIED TYPE: ICD-10-CM

## 2025-04-23 DIAGNOSIS — D69.6 THROMBOCYTOPENIA (HCC): ICD-10-CM

## 2025-04-23 DIAGNOSIS — J44.9 CHRONIC OBSTRUCTIVE PULMONARY DISEASE, UNSPECIFIED COPD TYPE (HCC): ICD-10-CM

## 2025-04-23 DIAGNOSIS — F31.9 BIPOLAR 1 DISORDER (HCC): Primary | ICD-10-CM

## 2025-04-23 DIAGNOSIS — I85.11 SECONDARY ESOPHAGEAL VARICES WITH BLEEDING (HCC): ICD-10-CM

## 2025-04-23 DIAGNOSIS — F25.0 SCHIZOAFFECTIVE DISORDER, BIPOLAR TYPE (HCC): ICD-10-CM

## 2025-04-23 DIAGNOSIS — I10 PRIMARY HYPERTENSION: ICD-10-CM

## 2025-04-23 PROBLEM — F29 PSYCHOSIS (HCC): Status: ACTIVE | Noted: 2023-12-29

## 2025-04-23 PROBLEM — J90 PLEURAL EFFUSION: Status: ACTIVE | Noted: 2019-12-19

## 2025-04-23 PROBLEM — R79.89 LACTIC ACID BLOOD INCREASED: Status: ACTIVE | Noted: 2019-12-08

## 2025-04-23 PROBLEM — R60.0 LOWER EXTREMITY EDEMA: Status: ACTIVE | Noted: 2023-02-08

## 2025-04-23 PROBLEM — E87.20 LACTIC ACIDOSIS: Status: ACTIVE | Noted: 2019-12-06

## 2025-04-23 PROBLEM — F20.0 PARANOID SCHIZOPHRENIA (HCC): Status: ACTIVE | Noted: 2025-04-23

## 2025-04-23 PROBLEM — I87.2 PERIPHERAL VENOUS INSUFFICIENCY: Status: ACTIVE | Noted: 2025-04-23

## 2025-04-23 PROBLEM — D50.9 IRON DEFICIENCY ANEMIA: Status: ACTIVE | Noted: 2025-04-23

## 2025-04-23 PROBLEM — Z91.199 PATIENT NON ADHERENCE: Status: ACTIVE | Noted: 2023-04-24

## 2025-04-23 PROBLEM — F10.11 NONDEPENDENT ALCOHOL ABUSE, IN REMISSION: Status: ACTIVE | Noted: 2025-04-23

## 2025-04-23 PROBLEM — Z75.8: Status: ACTIVE | Noted: 2023-09-27

## 2025-04-23 PROBLEM — Z59.819 HOUSING SITUATION UNSTABLE: Status: ACTIVE | Noted: 2023-09-27

## 2025-04-23 PROCEDURE — 99205 OFFICE O/P NEW HI 60 MIN: CPT | Performed by: FAMILY MEDICINE

## 2025-04-23 RX ORDER — HALOPERIDOL DECANOATE 100 MG/ML
INJECTION INTRAMUSCULAR
COMMUNITY
Start: 2025-03-27

## 2025-04-23 RX ORDER — ARIPIPRAZOLE 5 MG/1
5 TABLET ORAL DAILY
Qty: 30 TABLET | Refills: 5 | Status: SHIPPED | OUTPATIENT
Start: 2025-04-23

## 2025-04-23 NOTE — ASSESSMENT & PLAN NOTE
She is still drinking alcohol 6 cans of beer a day.  Orders:    Ambulatory referral to Psych Services; Future

## 2025-04-23 NOTE — PROGRESS NOTES
"Name: Alyson Madera      : 1965      MRN: [id number]  Encounter Provider: Tristin Alvarenga DO  Encounter Date: 2025   Encounter department: [facility] PRIMARY CARE  :  Assessment & Plan  Primary hypertension  Continue current therapy.  Will follow labs.       Chronic obstructive pulmonary disease, unspecified COPD type (HCC)  Encourage smoking cessation.       Alcoholic cirrhosis of liver without ascites (HCC)  She is still drinking alcohol 6 cans of beer a day.  Orders:    Ambulatory referral to Psych Services; Future    Hypothyroidism, unspecified type  Continue current therapy.       Thrombocytopenia (HCC)  Discussed repeating lab work.       Schizoaffective disorder, bipolar type (HCC)    Orders:    ARIPiprazole (ABILIFY) 5 mg tablet; Take 1 tablet (5 mg total) by mouth daily    Ambulatory referral to Psych Services; Future  Will try to get seen by psychiatry.  Bipolar 1 disorder (HCC)    Orders:    ARIPiprazole (ABILIFY) 5 mg tablet; Take 1 tablet (5 mg total) by mouth daily    Ambulatory referral to Psych Services; Future    Secondary esophageal varices with bleeding (HCC)         Alcohol dependence, uncomplicated (HCC)    Orders:    Ambulatory referral to Psych Services; Future           History of Present Illness   Patient presents with:  multiple problems: 1. Cirrhosis of liver, was in a nursing home x 2 years. 2. Pt states she is bi-polar, is on Haldol - injectable, she use to see psych.  Does not go there anymore.  Also has a R hip that is wasting away.          Review of Systems    Objective   /80 (BP Location: Right arm, Patient Position: Sitting, Cuff Size: Standard)   Pulse 103   Temp 98.2 °F (36.8 °C)   Ht 5' 6\" (1.676 m)   Wt 69.9 kg (154 lb)   SpO2 94%   BMI 24.86 kg/m²      Physical Exam  Vitals and nursing note reviewed.   Constitutional:       Appearance: She is well-developed.   HENT:      Head: Normocephalic and atraumatic.   Eyes:      Pupils: Pupils " are equal, round, and reactive to light.   Cardiovascular:      Rate and Rhythm: Normal rate.   Pulmonary:      Effort: Pulmonary effort is normal.   Abdominal:      Palpations: Abdomen is soft.   Musculoskeletal:         General: Normal range of motion.      Cervical back: Normal range of motion and neck supple.   Skin:     General: Skin is warm.   Neurological:      Mental Status: She is alert and oriented to person, place, and time.   Psychiatric:         Behavior: Behavior normal.

## 2025-04-23 NOTE — ASSESSMENT & PLAN NOTE
Orders:    ARIPiprazole (ABILIFY) 5 mg tablet; Take 1 tablet (5 mg total) by mouth daily    Ambulatory referral to Psych Services; Future  Will try to get seen by psychiatry.

## 2025-04-24 PROBLEM — F33.1 MODERATE EPISODE OF RECURRENT MAJOR DEPRESSIVE DISORDER (HCC): Status: ACTIVE | Noted: 2025-04-24

## 2025-06-14 DIAGNOSIS — F31.9 BIPOLAR 1 DISORDER (HCC): ICD-10-CM

## 2025-06-14 DIAGNOSIS — F25.0 SCHIZOAFFECTIVE DISORDER, BIPOLAR TYPE (HCC): ICD-10-CM

## 2025-06-16 RX ORDER — ARIPIPRAZOLE 5 MG/1
5 TABLET ORAL DAILY
Qty: 90 TABLET | Refills: 2 | Status: SHIPPED | OUTPATIENT
Start: 2025-06-16

## 2025-07-08 ENCOUNTER — TELEPHONE (OUTPATIENT)
Age: 60
End: 2025-07-08

## 2025-07-08 NOTE — TELEPHONE ENCOUNTER
Received email from Timothy  referring pt for OP MM and TT services. Email included a referral/clinicals, doc uploaded to chart. Pt discharged as of 7/7/25. Writer responded to email advising SW a copy of discharge paperwork is needed in order to proceed with scheduling. Awaiting receipt of discharge paperwork.

## 2025-07-11 ENCOUNTER — TELEPHONE (OUTPATIENT)
Age: 60
End: 2025-07-11

## 2025-07-11 NOTE — TELEPHONE ENCOUNTER
Talia form [facility] will be faxing over Medial Questionnaire to be completed. Office fax number given     You will receive a phone call from the central scheduling department to schedule your testing (Pulmonary Function Testing; Thyroid Ultrasound). The phone number to this department is [phone].     Referral will be sent to Orthopaedic Surgery. The office will contact you to schedule an appointment. The office is located next to [facility], [address]. The office phone number is [phone] if you wish to call and schedule.

## (undated) DEVICE — ACE WRAP 6 IN UNSTERILE

## (undated) DEVICE — PROXIMATE SKIN STAPLERS (35 WIDE) CONTAINS 35 STAINLESS STEEL STAPLES (FIXED HEAD): Brand: PROXIMATE

## (undated) DEVICE — 3.2MM GUIDE WIRE 400MM

## (undated) DEVICE — SCD SEQUENTIAL COMPRESSION COMFORT SLEEVE MEDIUM KNEE LENGTH: Brand: KENDALL SCD

## (undated) DEVICE — 3M™ STERI-DRAPE™ U-DRAPE 1015: Brand: STERI-DRAPE™

## (undated) DEVICE — POSITIONER HANA TABLE PACK

## (undated) DEVICE — GLOVE INDICATOR PI UNDERGLOVE SZ 8.5 BLUE

## (undated) DEVICE — PLUMEPEN PRO 10FT

## (undated) DEVICE — PAD CAST 4 IN COTTON NON STERILE

## (undated) DEVICE — SURGICAL GOWN, XL SMARTSLEEVE: Brand: CONVERTORS

## (undated) DEVICE — GLOVE SRG BIOGEL ORTHOPEDIC 7.5

## (undated) DEVICE — BETHLEHEM TOTAL HIP, KIT: Brand: CARDINAL HEALTH

## (undated) DEVICE — SUT VICRYL 0 CT-1 27 IN J260H

## (undated) DEVICE — DRESSING MEPILEX AG BORDER POST-OP 4 X 6 IN

## (undated) DEVICE — 4.2MM THREE-FLUTED DRILL BIT QC/330MM/100MM CALIBRATION

## (undated) DEVICE — DRAPE C-ARM X-RAY

## (undated) DEVICE — GLOVE SRG BIOGEL 8.5

## (undated) DEVICE — INTENDED FOR TISSUE SEPARATION, AND OTHER PROCEDURES THAT REQUIRE A SHARP SURGICAL BLADE TO PUNCTURE OR CUT.: Brand: BARD-PARKER SAFETY BLADES SIZE 15, STERILE

## (undated) DEVICE — 6617 IOBAN II PATIENT ISOLATION DRAPE 5/BX,4BX/CS: Brand: STERI-DRAPE™ IOBAN™ 2

## (undated) DEVICE — COBAN 4 IN STERILE

## (undated) DEVICE — SUT VICRYL 2-0 SH 27 IN UNDYED J417H